# Patient Record
Sex: MALE | Race: WHITE | NOT HISPANIC OR LATINO | Employment: OTHER | URBAN - METROPOLITAN AREA
[De-identification: names, ages, dates, MRNs, and addresses within clinical notes are randomized per-mention and may not be internally consistent; named-entity substitution may affect disease eponyms.]

---

## 2018-04-12 ENCOUNTER — HOSPITAL ENCOUNTER (INPATIENT)
Facility: HOSPITAL | Age: 78
LOS: 4 days | Discharge: HOME | End: 2018-04-16
Attending: EMERGENCY MEDICINE | Admitting: INTERNAL MEDICINE

## 2018-04-12 ENCOUNTER — APPOINTMENT (OUTPATIENT)
Dept: GENERAL RADIOLOGY | Facility: HOSPITAL | Age: 78
End: 2018-04-12

## 2018-04-12 ENCOUNTER — APPOINTMENT (OUTPATIENT)
Dept: CT IMAGING | Facility: HOSPITAL | Age: 78
End: 2018-04-12

## 2018-04-12 DIAGNOSIS — R77.8 ELEVATED TROPONIN: ICD-10-CM

## 2018-04-12 DIAGNOSIS — R26.9 GAIT ABNORMALITY: ICD-10-CM

## 2018-04-12 DIAGNOSIS — I63.22 BASILAR ARTERY OCCLUSION WITH CEREBRAL INFARCTION (HCC): Primary | ICD-10-CM

## 2018-04-12 LAB
ABO GROUP BLD: NORMAL
ALBUMIN SERPL-MCNC: 4.4 G/DL (ref 3.5–5.2)
ALBUMIN/GLOB SERPL: 1.3 G/DL
ALP SERPL-CCNC: 119 U/L (ref 39–117)
ALT SERPL W P-5'-P-CCNC: 18 U/L (ref 1–41)
ANION GAP SERPL CALCULATED.3IONS-SCNC: 14.7 MMOL/L
APTT PPP: 31.7 SECONDS (ref 22.7–35.4)
AST SERPL-CCNC: 29 U/L (ref 1–40)
BACTERIA UR QL AUTO: ABNORMAL /HPF
BASOPHILS # BLD AUTO: 0.02 10*3/MM3 (ref 0–0.2)
BASOPHILS NFR BLD AUTO: 0.1 % (ref 0–1.5)
BILIRUB SERPL-MCNC: 0.3 MG/DL (ref 0.1–1.2)
BILIRUB UR QL STRIP: NEGATIVE
BLD GP AB SCN SERPL QL: NEGATIVE
BUN BLD-MCNC: 18 MG/DL (ref 8–23)
BUN/CREAT SERPL: 18.6 (ref 7–25)
CALCIUM SPEC-SCNC: 9.3 MG/DL (ref 8.6–10.5)
CHLORIDE SERPL-SCNC: 96 MMOL/L (ref 98–107)
CLARITY UR: ABNORMAL
CO2 SERPL-SCNC: 27.3 MMOL/L (ref 22–29)
COLOR UR: ABNORMAL
CREAT BLD-MCNC: 0.97 MG/DL (ref 0.76–1.27)
DEPRECATED RDW RBC AUTO: 42.7 FL (ref 37–54)
EOSINOPHIL # BLD AUTO: 0.1 10*3/MM3 (ref 0–0.7)
EOSINOPHIL NFR BLD AUTO: 0.7 % (ref 0.3–6.2)
ERYTHROCYTE [DISTWIDTH] IN BLOOD BY AUTOMATED COUNT: 12.2 % (ref 11.5–14.5)
GFR SERPL CREATININE-BSD FRML MDRD: 75 ML/MIN/1.73
GLOBULIN UR ELPH-MCNC: 3.5 GM/DL
GLUCOSE BLD-MCNC: 122 MG/DL (ref 65–99)
GLUCOSE BLDC GLUCOMTR-MCNC: 102 MG/DL (ref 70–130)
GLUCOSE BLDC GLUCOMTR-MCNC: 123 MG/DL (ref 70–130)
GLUCOSE BLDC GLUCOMTR-MCNC: 124 MG/DL (ref 70–130)
GLUCOSE UR STRIP-MCNC: NEGATIVE MG/DL
HCT VFR BLD AUTO: 44.4 % (ref 40.4–52.2)
HGB BLD-MCNC: 14.8 G/DL (ref 13.7–17.6)
HGB UR QL STRIP.AUTO: ABNORMAL
HOLD SPECIMEN: NORMAL
HOLD SPECIMEN: NORMAL
HYALINE CASTS UR QL AUTO: ABNORMAL /LPF
IMM GRANULOCYTES # BLD: 0.06 10*3/MM3 (ref 0–0.03)
IMM GRANULOCYTES NFR BLD: 0.4 % (ref 0–0.5)
INR PPP: 1.1 (ref 0.9–1.1)
KETONES UR QL STRIP: NEGATIVE
LEUKOCYTE ESTERASE UR QL STRIP.AUTO: ABNORMAL
LYMPHOCYTES # BLD AUTO: 0.68 10*3/MM3 (ref 0.9–4.8)
LYMPHOCYTES NFR BLD AUTO: 4.4 % (ref 19.6–45.3)
MCH RBC QN AUTO: 32 PG (ref 27–32.7)
MCHC RBC AUTO-ENTMCNC: 33.3 G/DL (ref 32.6–36.4)
MCV RBC AUTO: 96.1 FL (ref 79.8–96.2)
MONOCYTES # BLD AUTO: 1.08 10*3/MM3 (ref 0.2–1.2)
MONOCYTES NFR BLD AUTO: 7 % (ref 5–12)
NEUTROPHILS # BLD AUTO: 13.43 10*3/MM3 (ref 1.9–8.1)
NEUTROPHILS NFR BLD AUTO: 87.4 % (ref 42.7–76)
NITRITE UR QL STRIP: POSITIVE
PH UR STRIP.AUTO: 7.5 [PH] (ref 5–8)
PLATELET # BLD AUTO: 235 10*3/MM3 (ref 140–500)
PMV BLD AUTO: 9.5 FL (ref 6–12)
POTASSIUM BLD-SCNC: 4 MMOL/L (ref 3.5–5.2)
PROT SERPL-MCNC: 7.9 G/DL (ref 6–8.5)
PROT UR QL STRIP: ABNORMAL
PROTHROMBIN TIME: 14 SECONDS (ref 11.7–14.2)
RBC # BLD AUTO: 4.62 10*6/MM3 (ref 4.6–6)
RBC # UR: ABNORMAL /HPF
REF LAB TEST METHOD: ABNORMAL
RH BLD: NEGATIVE
SODIUM BLD-SCNC: 138 MMOL/L (ref 136–145)
SP GR UR STRIP: >=1.03 (ref 1–1.03)
SQUAMOUS #/AREA URNS HPF: ABNORMAL /HPF
T&S EXPIRATION DATE: NORMAL
TROPONIN T SERPL-MCNC: 0.41 NG/ML (ref 0–0.03)
TROPONIN T SERPL-MCNC: 0.48 NG/ML (ref 0–0.03)
UROBILINOGEN UR QL STRIP: ABNORMAL
WBC NRBC COR # BLD: 15.37 10*3/MM3 (ref 4.5–10.7)
WBC UR QL AUTO: ABNORMAL /HPF
WHOLE BLOOD HOLD SPECIMEN: NORMAL
WHOLE BLOOD HOLD SPECIMEN: NORMAL

## 2018-04-12 PROCEDURE — 80053 COMPREHEN METABOLIC PANEL: CPT | Performed by: EMERGENCY MEDICINE

## 2018-04-12 PROCEDURE — 85025 COMPLETE CBC W/AUTO DIFF WBC: CPT | Performed by: EMERGENCY MEDICINE

## 2018-04-12 PROCEDURE — 0042T HC CT CEREBRAL PERFUSION W/WO CONTRAST: CPT

## 2018-04-12 PROCEDURE — 82565 ASSAY OF CREATININE: CPT

## 2018-04-12 PROCEDURE — 84484 ASSAY OF TROPONIN QUANT: CPT | Performed by: NURSE PRACTITIONER

## 2018-04-12 PROCEDURE — 86900 BLOOD TYPING SEROLOGIC ABO: CPT | Performed by: EMERGENCY MEDICINE

## 2018-04-12 PROCEDURE — 93005 ELECTROCARDIOGRAM TRACING: CPT | Performed by: EMERGENCY MEDICINE

## 2018-04-12 PROCEDURE — 87086 URINE CULTURE/COLONY COUNT: CPT | Performed by: EMERGENCY MEDICINE

## 2018-04-12 PROCEDURE — 85610 PROTHROMBIN TIME: CPT | Performed by: EMERGENCY MEDICINE

## 2018-04-12 PROCEDURE — 93010 ELECTROCARDIOGRAM REPORT: CPT | Performed by: INTERNAL MEDICINE

## 2018-04-12 PROCEDURE — 82962 GLUCOSE BLOOD TEST: CPT

## 2018-04-12 PROCEDURE — 70498 CT ANGIOGRAPHY NECK: CPT

## 2018-04-12 PROCEDURE — 70496 CT ANGIOGRAPHY HEAD: CPT

## 2018-04-12 PROCEDURE — 99406 BEHAV CHNG SMOKING 3-10 MIN: CPT | Performed by: RADIOLOGY

## 2018-04-12 PROCEDURE — 81001 URINALYSIS AUTO W/SCOPE: CPT | Performed by: EMERGENCY MEDICINE

## 2018-04-12 PROCEDURE — 82607 VITAMIN B-12: CPT | Performed by: PSYCHIATRY & NEUROLOGY

## 2018-04-12 PROCEDURE — 71045 X-RAY EXAM CHEST 1 VIEW: CPT

## 2018-04-12 PROCEDURE — 85730 THROMBOPLASTIN TIME PARTIAL: CPT | Performed by: EMERGENCY MEDICINE

## 2018-04-12 PROCEDURE — 99223 1ST HOSP IP/OBS HIGH 75: CPT | Performed by: RADIOLOGY

## 2018-04-12 PROCEDURE — 99221 1ST HOSP IP/OBS SF/LOW 40: CPT | Performed by: NURSE PRACTITIONER

## 2018-04-12 PROCEDURE — 86850 RBC ANTIBODY SCREEN: CPT | Performed by: EMERGENCY MEDICINE

## 2018-04-12 PROCEDURE — 0 IOPAMIDOL PER 1 ML: Performed by: EMERGENCY MEDICINE

## 2018-04-12 PROCEDURE — 86901 BLOOD TYPING SEROLOGIC RH(D): CPT | Performed by: EMERGENCY MEDICINE

## 2018-04-12 PROCEDURE — 99285 EMERGENCY DEPT VISIT HI MDM: CPT

## 2018-04-12 PROCEDURE — 84484 ASSAY OF TROPONIN QUANT: CPT | Performed by: EMERGENCY MEDICINE

## 2018-04-12 RX ORDER — ATORVASTATIN CALCIUM 80 MG/1
80 TABLET, FILM COATED ORAL DAILY
Status: DISCONTINUED | OUTPATIENT
Start: 2018-04-12 | End: 2018-04-16 | Stop reason: HOSPADM

## 2018-04-12 RX ORDER — LEVOTHYROXINE SODIUM 0.05 MG/1
50 TABLET ORAL DAILY
COMMUNITY

## 2018-04-12 RX ORDER — ASPIRIN 325 MG
325 TABLET ORAL DAILY
Status: DISCONTINUED | OUTPATIENT
Start: 2018-04-12 | End: 2018-04-14

## 2018-04-12 RX ORDER — SODIUM CHLORIDE 0.9 % (FLUSH) 0.9 %
10 SYRINGE (ML) INJECTION AS NEEDED
Status: DISCONTINUED | OUTPATIENT
Start: 2018-04-12 | End: 2018-04-16 | Stop reason: HOSPADM

## 2018-04-12 RX ORDER — SODIUM CHLORIDE 9 MG/ML
100 INJECTION, SOLUTION INTRAVENOUS CONTINUOUS
Status: DISCONTINUED | OUTPATIENT
Start: 2018-04-12 | End: 2018-04-13

## 2018-04-12 RX ORDER — SODIUM CHLORIDE 0.9 % (FLUSH) 0.9 %
1-10 SYRINGE (ML) INJECTION AS NEEDED
Status: DISCONTINUED | OUTPATIENT
Start: 2018-04-12 | End: 2018-04-16 | Stop reason: HOSPADM

## 2018-04-12 RX ORDER — AMLODIPINE BESYLATE 5 MG/1
5 TABLET ORAL DAILY
COMMUNITY
End: 2018-04-16 | Stop reason: HOSPADM

## 2018-04-12 RX ORDER — ASPIRIN 325 MG
325 TABLET ORAL ONCE
Status: COMPLETED | OUTPATIENT
Start: 2018-04-12 | End: 2018-04-12

## 2018-04-12 RX ORDER — SODIUM CHLORIDE 0.9 % (FLUSH) 0.9 %
10 SYRINGE (ML) INJECTION AS NEEDED
Status: DISCONTINUED | OUTPATIENT
Start: 2018-04-12 | End: 2018-04-12

## 2018-04-12 RX ORDER — ACETAMINOPHEN 325 MG/1
650 TABLET ORAL EVERY 4 HOURS PRN
Status: DISCONTINUED | OUTPATIENT
Start: 2018-04-12 | End: 2018-04-16 | Stop reason: HOSPADM

## 2018-04-12 RX ORDER — ASPIRIN 300 MG/1
300 SUPPOSITORY RECTAL DAILY
Status: DISCONTINUED | OUTPATIENT
Start: 2018-04-12 | End: 2018-04-14

## 2018-04-12 RX ORDER — ACETAMINOPHEN 650 MG/1
650 SUPPOSITORY RECTAL EVERY 4 HOURS PRN
Status: DISCONTINUED | OUTPATIENT
Start: 2018-04-12 | End: 2018-04-16 | Stop reason: HOSPADM

## 2018-04-12 RX ORDER — ONDANSETRON 2 MG/ML
4 INJECTION INTRAMUSCULAR; INTRAVENOUS EVERY 6 HOURS PRN
Status: DISCONTINUED | OUTPATIENT
Start: 2018-04-12 | End: 2018-04-16 | Stop reason: HOSPADM

## 2018-04-12 RX ORDER — LEVOTHYROXINE SODIUM ANHYDROUS 100 UG/5ML
25 INJECTION, POWDER, LYOPHILIZED, FOR SOLUTION INTRAVENOUS
Status: DISCONTINUED | OUTPATIENT
Start: 2018-04-12 | End: 2018-04-14

## 2018-04-12 RX ORDER — LISINOPRIL AND HYDROCHLOROTHIAZIDE 25; 20 MG/1; MG/1
1 TABLET ORAL DAILY
COMMUNITY
End: 2018-05-01 | Stop reason: HOSPADM

## 2018-04-12 RX ORDER — ATORVASTATIN CALCIUM 20 MG/1
20 TABLET, FILM COATED ORAL DAILY
COMMUNITY

## 2018-04-12 RX ADMIN — ASPIRIN 325 MG: 325 TABLET ORAL at 12:24

## 2018-04-12 RX ADMIN — SODIUM CHLORIDE 1000 ML: 9 INJECTION, SOLUTION INTRAVENOUS at 11:50

## 2018-04-12 RX ADMIN — ATORVASTATIN CALCIUM 80 MG: 80 TABLET, FILM COATED ORAL at 15:38

## 2018-04-12 RX ADMIN — IOPAMIDOL 150 ML: 755 INJECTION, SOLUTION INTRAVENOUS at 11:33

## 2018-04-12 RX ADMIN — SODIUM CHLORIDE 100 ML/HR: 9 INJECTION, SOLUTION INTRAVENOUS at 15:38

## 2018-04-12 NOTE — CONSULTS
DOS: 2018  NAME: Jalen Richards   : 1940  PCP: No Known Provider  CC: Stroke  Referring MD: Alan Santana MD    Neurological Problem and Interval History:  77 y.o. RHW male with a Hx of hyperlipidemia, hypertension and smoking.  The patient was in usual state of excellent health when 8:30 this morning he suddenly felt disoriented, meaning dizzy and vertiginous. His legs couldn't hold him but he did not fall.  When he tried to speak his speech was very slurred.  He did not seek immediate medical attention but then eventually as the symptoms were not improving he came to [facility] where he started to improve.  His NIHSS was a 2 and therefore he was not a TPA candidate.  He feels currently that he is nearly 100% normal other than having a sense that his equilibrium is off.  He denies prior stroke or TIA symptoms.  He denies a history to fibrillation.  He smokes one pack per day.  He does not take any antithrombotic's.  He is newly diagnosed with hypertension has been started on some medications.  He lives with his wife and is cognitively normal and independent.    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 No pertinent surgical history.    Review of Systems:        A complete review of all systems is negative except as described above.    Medications On Admission  Lisinopril hydrochlorothiazide, Lipitor 20, thyroid hormone, calcium channel blocker    Allergies:  No Known Allergies    Social Hx:  Social History     Social History   • Marital status:      Spouse name: N/A   • Number of children: N/A   • Years of education: N/A     Occupational History   • Not on file.     Social History Main Topics   • Smoking status: Current Every Day Smoker     Packs/day: 1.00     Types: Cigarettes   • Smokeless tobacco: Not on file    • Alcohol use Not on file   • Drug use: Unknown   • Sexual activity: Not on file     Other Topics Concern   • Not on file     Social History Narrative   • No narrative on file       Family Hx:  History reviewed. No pertinent family history.    Review of Imaging (Interpretation of images not reports):  CT angiogram head and neck:There is calcification of the aortic arch with an aneurysm medially of the proximal descending aorta, and subclavian arteries are patent and there is a severe stenosis of the origin of the left vertebral artery which is slightly smaller in the right, there is severe bulky plaque left internal carotid artery worse than right with ulceration, intracranially the left vertebral artery appears to have a severe stenosis as it penetrates the dura, the basilar artery is patent proximally but is flush occluded in its terminus, there is calcification of the intracranial internal carotid arteries and bilateral straight communicating arteries filling the basilar terminus and posterior cerebral arteries    CT scan of the brain is a subtle hypodensity in the medial right occipital lobe that could represent acute to subacute stroke, there is an old stroke in the left occipital parietal lobe, is a dense calcification in the left subcortical frontal white matter likely representing dystrophic calcification or a cavernous angioma    Laboratory Results:   Lab Results   Component Value Date    GLUCOSE 122 (H) 04/12/2018    CALCIUM 9.3 04/12/2018     04/12/2018    K 4.0 04/12/2018    CO2 27.3 04/12/2018    CL 96 (L) 04/12/2018    BUN 18 04/12/2018    CREATININE 0.97 04/12/2018    EGFRIFNONA 75 04/12/2018    BCR 18.6 04/12/2018    ANIONGAP 14.7 04/12/2018     Lab Results   Component Value Date    WBC 15.37 (H) 04/12/2018    HGB 14.8 04/12/2018    HCT 44.4 04/12/2018    MCV 96.1 04/12/2018     04/12/2018     No results found for: CHOL  No results found for: HDL  No results found for: LDL  No results  "found for: TRIG  No results found for: HGBA1C  Lab Results   Component Value Date    INR 1.10 04/12/2018    PROTIME 14.0 04/12/2018   EKG: Sinus rhythm    Physical Examination:  /98   Pulse 92   Temp 97.8 °F (36.6 °C) (Tympanic)   Resp 16   Ht 180.3 cm (71\")   Wt 81.6 kg (180 lb)   SpO2 96%   BMI 25.10 kg/m²   General Appearance:   Well developed, well nourished, well groomed, alert, and cooperative.  HEENT: Normocephalic.  Normal fundoscopic exam including normal retina, discs are flat with sharp margins, normal vasculature.  Cb's sign.  Black to dark greenish discoloration to the top of the tongue  Neck and Spine: Normal range of motion.  Normal alignment. No mass or tenderness. No bruits.  Cardiac: Regular rate and rhythm. No murmurs.  Peripheral Vasculature: Radial 1+ and pedal pulses are trace. No signs of distal embolization.  Extremities:    No edema or deformities. Normal joint ROM.  Skin:    No rashes or birth marks.    Neurological examination:  Higher Integrative  Function: Oriented to time, place and person. Normal registration, recall, attention span and concentration. Normal language including comprehension, spontaneous speech, repetition, reading, writing, naming and vocabulary. No neglect with normal visual-spatial function and construction. Normal fund of knowledge and higher integrative function.  CN II: Pupils are equal, round, and reactive to light. Normal visual acuity and visual fields.    CN III IV VI: Extraocular movements are full without nystagmus.   CN V: Normal facial sensation and strength of muscles of mastication.  CN VII: Facial movements are symmetric. No weakness.  CN VIII:   Auditory acuity is slightly reduced  CN IX & X:   Symmetric palatal movement.  CN XI: Sternocleidomastoid and trapezius are normal.  No weakness.  CN XII:   The tongue is midline.  No atrophy or fasciculations.  Motor: Normal muscle strength, bulk and tone in upper and lower extremities.  No " fasciculations, rigidity, spasticity, or abnormal movements.  Reflexes: 1+ in the R and 2+ L upper and lower extremities. Plantar responses are flexor.  Sensation: Normal to light touch, temperature, and proprioception in arms and legs. Normal graphesthesia and no extinction on DSS.  Station and Gait: Needs assistance to sit up and is unsteady.    Coordination: Finger to nose test shows no dysmetria.  Rapid alternating movements are normal.  Heel to shin normal.  NIHSS: 0    Diagnoses / Discussion:  77 y.o. who presents with Sx of dysarthria and vertigo and bilateral lower extremity weakness who is essentially normal on examination.  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  Additionally he has a severe stenosis of the origin the left vertebral artery.  The patient is not a TPA or endovascular therapy candidate as he is normal.  The basilar artery occlusion is most likely an embolic possibly artery to artery origin although a cardioembolic source cannot be excluded.  There is some potential for neurological deterioration therefore he will need to be monitored in intensive care unit if he makes it through the next 1-2 days without a deterioration the neurological prognosis will be excellent and the major issue will be confirming the etiology.  He needs risk factor control in particular he needs to discontinue smoking.  I counseled him and his family for about 7 minutes on the importance of and tools for smoking cessation.    Plan:  Aspirin 325mg  Hydrate- bolus 1 L normal saline stat (done)  Neurochecks  Check Lipid panel, Hgb A1C  Lipitor 80mg  Non-pharmacological DVT prophylaxis  TTE  CT/CTA/CTP stat (done)  MRI tomorrow  EKG Tele  PT/OT/ST  Stroke Education  Blood pressure control to <130/80  Goal LDL <70-recommend high dose statins-   Serum glucose < 140     Call 911 for stroke any stroke symptoms    I have discussed the above with the patient and  family and Dr Diaz.  Time spent with patient: 60min    MDM  Reviewed: vitals  Interpretation: CT scan, ECG and labs      Dictated using Dragon dictation.

## 2018-04-12 NOTE — SIGNIFICANT NOTE
04/12/18 1436   Rehab Time/Intention   Evaluation Not Performed other (see comments)  (RN asked for PT to check back tomorrow as pt was just admitted. )   Rehab Treatment   Discipline physical therapist   Recommendation   PT - Next Appointment 04/13/18

## 2018-04-12 NOTE — ED PROVIDER NOTES
EMERGENCY DEPARTMENT ENCOUNTER    CHIEF COMPLAINT  Chief Complaint: Dysphasia   History given by: Pt and family  History limited by: Nothing  Room Number: 37/37  PMD: No Known Provider      HPI:  Pt is a 77 y.o. male who presents complaining of sudden onset of difficulty talking and standing starting sometime between 0830 to 0900 this morning. Pt also c/o lightheadedness and LLE weakness. Pt reports his speech has improve, but is still not at baseline. Pt denies HA, visual disturbances, difficulty swallowing, palpitations, CP, SOA. Pt has a hx of HTN, high cholesterol, and hypothyroidism, and smoking. He denies a family hx of heart disease. Pt reports he takes a low dose of ASA every morning.     Duration:  Sx starting at [id number]  Onset: sudden  Timing: constant  Quality: difficulty talking  Intensity/Severity: moderate  Progression: improving  Associated Symptoms: lightheadedness, LLE weakness, difficulty standing  Aggravating Factors: none  Alleviating Factors: none  Previous Episodes: Pt reports a hx of high cholesterol, HTN, hypothyroidism, and smoking  Treatment before arrival: Pt reports he takes a low dose of ASA every morning    PAST MEDICAL HISTORY  Active Ambulatory Problems     Diagnosis Date Noted   • No Active Ambulatory Problems     Resolved Ambulatory Problems     Diagnosis Date Noted   • No Resolved Ambulatory Problems     Past Medical History:   Diagnosis Date   • Disease of thyroid gland    • Hyperlipidemia    • Hypertension        PAST SURGICAL HISTORY  History reviewed. No pertinent surgical history.    FAMILY HISTORY  History reviewed. No pertinent family history.    SOCIAL HISTORY  Social History     Social History   • Marital status:      Spouse name: N/A   • Number of children: N/A   • Years of education: N/A     Occupational History   • Not on file.     Social History Main Topics   • Smoking status: Current Every Day Smoker     Packs/day: 1.00     Types: Cigarettes   • Smokeless  tobacco: Not on file   • Alcohol use Not on file   • Drug use: Unknown   • Sexual activity: Not on file     Other Topics Concern   • Not on file     Social History Narrative   • No narrative on file       ALLERGIES  Review of patient's allergies indicates no known allergies.    REVIEW OF SYSTEMS  Review of Systems   Constitutional: Negative for activity change, appetite change and fever.   HENT: Negative for congestion and sore throat.    Eyes: Negative.    Respiratory: Negative for cough and shortness of breath.    Cardiovascular: Negative for chest pain and leg swelling.   Gastrointestinal: Negative for abdominal pain, diarrhea and vomiting.   Endocrine: Negative.    Genitourinary: Negative for decreased urine volume and dysuria.   Musculoskeletal: Negative for neck pain.   Skin: Negative for rash and wound.   Allergic/Immunologic: Negative.    Neurological: Positive for speech difficulty, weakness (LLE) and light-headedness. Negative for numbness and headaches.        Pt c/o difficulty standing   Hematological: Negative.    Psychiatric/Behavioral: Negative.    All other systems reviewed and are negative.      PHYSICAL EXAM  ED Triage Vitals   Temp Heart Rate Resp BP SpO2   04/12/18 1053 04/12/18 1053 04/12/18 1053 04/12/18 1057 04/12/18 1053   97.8 °F (36.6 °C) 78 14 (!) 189/91 96 %      Temp src Heart Rate Source Patient Position BP Location FiO2 (%)   04/12/18 1048 04/12/18 1048 04/12/18 1057 04/12/18 1057 --   Tympanic Monitor Lying Left arm          Physical Exam   Constitutional: He is oriented to person, place, and time and well-developed, well-nourished, and in no distress.   HENT:   Head: Normocephalic and atraumatic.   Eyes: EOM are normal. Pupils are equal, round, and reactive to light.   Neck: Normal range of motion. Neck supple.   Cardiovascular: Normal rate, regular rhythm and normal heart sounds.    No murmur heard.  Pulmonary/Chest: Effort normal and breath sounds normal. No respiratory distress.    Abdominal: Soft. There is no tenderness. There is no rebound and no guarding.   Musculoskeletal: Normal range of motion. He exhibits no edema.   Neurological: He is alert and oriented to person, place, and time.   See NIH Stroke Scale at 1059   Skin: Skin is warm and dry.   Psychiatric: Mood and affect normal.   Nursing note and vitals reviewed.      LAB RESULTS  Lab Results (last 24 hours)     Procedure Component Value Units Date/Time    POC Glucose Once [[id number]]  (Normal) Collected:  04/12/18 1055    Specimen:  Blood Updated:  04/12/18 1058     Glucose 123 mg/dL     Narrative:       Meter: [id number] : [id number] Annamaria Reyna RN    CBC & Differential [[id number]] Collected:  04/12/18 1100    Specimen:  Blood Updated:  04/12/18 1114    Narrative:       The following orders were created for panel order CBC & Differential.  Procedure                               Abnormality         Status                     ---------                               -----------         ------                     CBC Auto Differential[[id number]]        Abnormal            Final result                 Please view results for these tests on the individual orders.    Comprehensive Metabolic Panel [[id number]]  (Abnormal) Collected:  04/12/18 1100    Specimen:  Blood Updated:  04/12/18 1135     Glucose 122 (H) mg/dL      BUN 18 mg/dL      Creatinine 0.97 mg/dL      Sodium 138 mmol/L      Potassium 4.0 mmol/L      Chloride 96 (L) mmol/L      CO2 27.3 mmol/L      Calcium 9.3 mg/dL      Total Protein 7.9 g/dL      Albumin 4.40 g/dL      ALT (SGPT) 18 U/L      AST (SGOT) 29 U/L      Alkaline Phosphatase 119 (H) U/L      Total Bilirubin 0.3 mg/dL      eGFR Non African Amer 75 mL/min/1.73      Globulin 3.5 gm/dL      A/G Ratio 1.3 g/dL      BUN/Creatinine Ratio 18.6     Anion Gap 14.7 mmol/L     Narrative:       The MDRD GFR formula is only valid for adults with stable renal function between ages 18 and 70.    Protime-INR [[id number]]  (Normal)  Collected:  04/12/18 1100    Specimen:  Blood Updated:  04/12/18 1130     Protime 14.0 Seconds      INR 1.10    aPTT [[id number]]  (Normal) Collected:  04/12/18 1100    Specimen:  Blood Updated:  04/12/18 1130     PTT 31.7 seconds     Troponin [[id number]]  (Abnormal) Collected:  04/12/18 1100    Specimen:  Blood Updated:  04/12/18 1138     Troponin T 0.476 (C) ng/mL     Narrative:       Troponin T Reference Ranges:  Less than 0.03 ng/mL:    Negative for AMI  0.03 to 0.09 ng/mL:      Indeterminant for AMI  Greater than 0.09 ng/mL: Positive for AMI    CBC Auto Differential [[id number]]  (Abnormal) Collected:  04/12/18 1100    Specimen:  Blood Updated:  04/12/18 1114     WBC 15.37 (H) 10*3/mm3      RBC 4.62 10*6/mm3      Hemoglobin 14.8 g/dL      Hematocrit 44.4 %      MCV 96.1 fL      MCH 32.0 pg      MCHC 33.3 g/dL      RDW 12.2 %      RDW-SD 42.7 fl      MPV 9.5 fL      Platelets 235 10*3/mm3      Neutrophil % 87.4 (H) %      Lymphocyte % 4.4 (L) %      Monocyte % 7.0 %      Eosinophil % 0.7 %      Basophil % 0.1 %      Immature Grans % 0.4 %      Neutrophils, Absolute 13.43 (H) 10*3/mm3      Lymphocytes, Absolute 0.68 (L) 10*3/mm3      Monocytes, Absolute 1.08 10*3/mm3      Eosinophils, Absolute 0.10 10*3/mm3      Basophils, Absolute 0.02 10*3/mm3      Immature Grans, Absolute 0.06 (H) 10*3/mm3     Urinalysis With / Culture If Indicated - Urine, Clean Catch [[id number]] Collected:  04/12/18 1218    Specimen:  Urine from Urine, Clean Catch Updated:  04/12/18 1222    Urinalysis, Microscopic Only - Urine, Clean Catch [[id number]] Collected:  04/12/18 1218    Specimen:  Urine from Urine, Clean Catch Updated:  04/12/18 1230    Urine Culture - Urine, Urine, Clean Catch [[id number]] Collected:  04/12/18 1218    Specimen:  Urine from Urine, Clean Catch Updated:  04/12/18 1230          I ordered the above labs and reviewed the results    RADIOLOGY  XR Chest 1 View   Final Result   No focal pulmonary consolidation. Tortuous  aorta. Follow-up   as clinically indicated.       This report was finalized on 4/12/2018 12:50 PM by Dr. Jarad Kothari MD.          CT Angiogram Neck With & Without Contrast    (Results Pending)   CT Cerebral Perfusion With & Without Contrast    (Results Pending)   CT Angiogram Head With & Without Contrast    (Results Pending)   MRI Brain With & Without Contrast    (Results Pending)    Spoke with Dr. Gordon about CT results who reports that the pt has basilar artery occlusion.    I ordered the above noted radiological studies. Interpreted by radiologist. Reviewed by me in PACS.       PROCEDURES  Critical Care  Performed by: PHILOMENA SÁNCHEZ  Authorized by: PHILOMENA SÁNCHEZ     Critical care provider statement:     Critical care time (minutes):  35    Critical care time was exclusive of:  Separately billable procedures and treating other patients    Critical care was necessary to treat or prevent imminent or life-threatening deterioration of the following conditions:  CNS failure or compromise    Critical care was time spent personally by me on the following activities:  Ordering and performing treatments and interventions, ordering and review of laboratory studies, ordering and review of radiographic studies, pulse oximetry, re-evaluation of patient's condition, review of old charts, interpretation of cardiac output measurements, obtaining history from patient or surrogate, examination of patient, evaluation of patient's response to treatment, development of treatment plan with patient or surrogate and discussions with consultants          NIH Stroke Scale at 1059  Interval: baseline  1a. Level of Consciousness: 0-->Alert, keenly responsive  1b. LOC Questions: 0-->Answers both questions correctly  1c. LOC Commands: 0-->Performs both tasks correctly  2. Best Gaze: 0-->Normal  3. Visual: 0-->No visual loss  4. Facial Palsy: 0-->Normal symmetrical movements  5a. Motor Arm, Left: 0-->No drift, limb holds 90  (or 45) degrees for full 10 secs  5b. Motor Arm, Right: 0-->No drift, limb holds 90 (or 45) degrees for full 10 secs  6a. Motor Leg, Left: 1-->Drift, leg falls by the end of the 5-sec period but does not hit bed  6b. Motor Leg, Right: 0-->No drift, leg holds 30 degree position for full 5 secs  7. Limb Ataxia: 0-->Absent  8. Sensory: 0-->Normal, no sensory loss  9. Best Language: 1-->Mild-to-moderate aphasia, some obvious loss of fluency or facility of comprehension, without significant limitation on ideas expressed or form of expression. Reduction of speech and/or comprehension, however, makes conversation. . . (see row details)  10. Dysarthria: 0-->Normal  11. Extinction and Inattention (formerly Neglect): 0-->No abnormality    Total (NIH Stroke Scale): 2    EKG           EKG time: 1149  Rhythm/Rate: nsr, 87  P waves and WV: normal  QRS, axis: normal   ST and T waves: normal     Interpreted Contemporaneously by me, independently viewed  unchanged compared to prior 3/12/15        PROGRESS AND CONSULTS  ED Course       1059 - Team D protocol initiated. Medications, imaging and lab work ordered per protocol .    1104 - Consulted with Dr. Gordon (Stroke neurologist) who recommends performing complete Team D protocol, but is not a tPA candidate because stroke scale is too low, and sx are improving.     1145 - I have still not heard about the pt's CT head results, and Dr. Castillo is reading them now.     1149 - Rechecked pt. Pt's LLE no longer has a drift, and his expressive aphasia is now gone. Pt's IV infiltrated into R AC with mild swelling to distal R arm that is non-tender. Plan is to apply cold compress to the area.     1209 - Heidi reports that the pt has a basilar artery occlusion and recommends the pt be admitted to the ICU.     1213 - Consulted with Dr. Alan Santana (Pulmonology) who agrees to admit the pt.     1216 - ASA ordered. UA ordered.     MEDICAL DECISION MAKING  Results were reviewed/discussed  with the patient and they were also made aware of online access. Pt also made aware that some labs, such as cultures, will not be resulted during ER visit and follow up with PMD is necessary.     MDM  Number of Diagnoses or Management Options  Basilar artery occlusion with cerebral infarction:      Amount and/or Complexity of Data Reviewed  Clinical lab tests: reviewed and ordered (WBC - 15.37  Troponin - 0.476  INR - 1.10)  Tests in the radiology section of CPT®: reviewed and ordered (CT head results (Per Dr. Gordon) - Basilar artery occlusion)  Tests in the medicine section of CPT®: reviewed and ordered (See EKG procedure note.)  Discuss the patient with other providers: yes (Dr. Gordon (Stroke Neurology)  Dr. Alan Santana (Pulmonology))    Critical Care  Total time providing critical care: 30-74 minutes         DIAGNOSIS  Final diagnoses:   Basilar artery occlusion with cerebral infarction   Elevated troponin       DISPOSITION  ADMISSION    Discussed treatment plan and reason for admission with pt/family and admitting physician.  Pt/family voiced understanding of the plan for admission for further testing/treatment as needed.     Latest Documented Vital Signs:  As of 12:49 PM  BP- (!) 181/83 HR- 90 Temp- 97.8 °F (36.6 °C) (Tympanic) O2 sat- 97%    --  Documentation assistance provided by karma Bocanegra for Dr. Diaz.  Information recorded by the scribe was done at my direction and has been verified and validated by me.     Kevin Bocanegra  04/12/18 9795       Vitor Diaz MD  04/12/18 8540

## 2018-04-12 NOTE — H&P
[facility]    CC: difficultly talking and weakness    HPI: Mr. Richards is a 78yo WM with a history of tobacco abuse.  He reports sudden onset of severe aphasia and some mild left lower extremity weakness. No Particular aggravating or alleviating factors, but it resolved spontaneously.  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    Past Medical History:   Diagnosis Date   • Disease of thyroid gland    • Hyperlipidemia    • Hypertension      Social History     Social History   • Marital status:      Social History Main Topics   • Smoking status: Current Every Day Smoker     Packs/day: 1.00   • Smokeless tobacco: Never Used   • Alcohol use No   • Drug use: No   • Sexual activity: Not Currently     Other Topics Concern   • Not on file     History reviewed. No pertinent family history.  MEDS:  List as per rec  ALL: NKDA  ROS:  Constitutional: Negative for activity change, appetite change and fever.   HENT: Negative for congestion and sore throat.    Eyes: Negative.    Respiratory: Negative for cough and shortness of breath.    Cardiovascular: Negative for chest pain and leg swelling.   Gastrointestinal: Negative for abdominal pain, diarrhea and vomiting.   Endocrine: Negative.    Genitourinary: hematuria +  Musculoskeletal: Negative for neck pain.   Skin: Negative for rash and wound.   Allergic/Immunologic: Negative.    Neurological: Positive for speech difficulty, weakness (LLE) and light-headedness. Negative for numbness and headaches.        Pt c/o difficulty standing   Hematological: Negative.    Psychiatric/Behavioral: Negative.    All other systems reviewed and are negative.    GEN:   appears ill,  AxOx3  HEENT: PERRL, EOMI, no icterus, mmm, no jvd, trachea midline, neck supple  CHEST: CTA bilat, no wheezes, no crackles, no use of accessory muscles  CV: RRR, no  m/g/r  ABD: soft, nt, nd +bs, no hepatosplenomegaly  EXT: no c/c/e  SKIN: no rashes, no xanthomas, nl turgor  LYMPH: no palpable cervical or supraclavicular lymphadenopathy  NEURO: CN 2-12 intact and symmetric bilaterally  PSYCH: nl affect, nl orientation, nl judgement, nl mood  MSK: No kyphoscoliosis, 5/5 strength ue and le bilaterally    Labs:  BmP; unremarkable  Trop 0.476  Wbc 15  hgb 14.8  plts 235    Imaging: reviewed    A/P:  1. Acute CVA -- care as per protocol; DR. Lopez following.   2. Basilar artery occlusion  3. Carotid artery stenosis  4. Hypertension -  5. Hypothyroidism -- continue replacement  6. Tobacco abuse -- discussed cessation  7. Elevated troponin -- will have cards to see  8. Leukocytosis -- suspect reactive.   9. Hematuria -- will have urology to see    D/w family and with Dr. Diaz

## 2018-04-12 NOTE — PLAN OF CARE
Problem: Patient Care Overview  Goal: Plan of Care Review  Outcome: Ongoing (interventions implemented as appropriate)   04/12/18 1828   Coping/Psychosocial   Plan of Care Reviewed With patient;daughter   Plan of Care Review   Progress improving   OTHER   Outcome Summary Pt admitted from ER with ischemic stroke. NIHSS on admit is 0. Alert cooperative. Denies H/A or nausea . Passed swallow screen ate heart healthy diet without difficulty. Family at [facility]. Cardiology consulted for elevated troponin. Will cont to monitor. For MRI in AM.       Problem: Skin Injury Risk (Adult)  Goal: Identify Related Risk Factors and Signs and Symptoms  Outcome: Ongoing (interventions implemented as appropriate)   04/12/18 1828   Skin Injury Risk (Adult)   Related Risk Factors (Skin Injury Risk) fluid intake inadequate;mobility impaired       Problem: Fall Risk (Adult)  Goal: Identify Related Risk Factors and Signs and Symptoms  Outcome: Ongoing (interventions implemented as appropriate)   04/12/18 1828   Fall Risk (Adult)   Related Risk Factors (Fall Risk) age-related changes;bladder function altered;depression/anxiety;fear of falling;gait/mobility problems;homeostatic imbalance;neuro disease/injury;environment unfamiliar   Signs and Symptoms (Fall Risk) presence of risk factors       Problem: Anxiety (Adult)  Goal: Identify Related Risk Factors and Signs and Symptoms  Outcome: Ongoing (interventions implemented as appropriate)   04/12/18 1828   Anxiety (Adult)   Related Risk Factors (Anxiety) environment change;health status change;loss of control   Signs and Symptoms (Anxiety) concentration decreased;nervousness/tension/restlessness;physical complaints/symptoms;preoccupation;sleep disturbance       Problem: Stroke (Ischemic) (Adult)  Goal: Signs and Symptoms of Listed Potential Problems Will be Absent, Minimized or Managed (Stroke)  Outcome: Ongoing (interventions implemented as appropriate)   04/12/18 1828   Goal/Outcome Evaluation    Problems Assessed (Stroke (Ischemic)) all   Problems Assessed (Stroke (Ischemic)) none       Problem: Pain, Acute (Adult)  Goal: Identify Related Risk Factors and Signs and Symptoms  Outcome: Ongoing (interventions implemented as appropriate)   04/12/18 8017   Pain, Acute (Adult)   Related Risk Factors (Acute Pain) disease process;patient perception;knowledge deficit   Signs and Symptoms (Acute Pain) BADLs/IADLs reluctance/inability to perform;sleep pattern alteration

## 2018-04-13 ENCOUNTER — APPOINTMENT (OUTPATIENT)
Dept: MRI IMAGING | Facility: HOSPITAL | Age: 78
End: 2018-04-13
Attending: RADIOLOGY

## 2018-04-13 ENCOUNTER — APPOINTMENT (OUTPATIENT)
Dept: CARDIOLOGY | Facility: HOSPITAL | Age: 78
End: 2018-04-13
Attending: RADIOLOGY

## 2018-04-13 LAB
BH CV ECHO MEAS - ACS: 1.7 CM
BH CV ECHO MEAS - AO MAX PG (FULL): 1.2 MMHG
BH CV ECHO MEAS - AO MAX PG: 6.7 MMHG
BH CV ECHO MEAS - AO MEAN PG (FULL): 1 MMHG
BH CV ECHO MEAS - AO MEAN PG: 3 MMHG
BH CV ECHO MEAS - AO ROOT AREA (BSA CORRECTED): 1.8
BH CV ECHO MEAS - AO ROOT AREA: 9.6 CM^2
BH CV ECHO MEAS - AO ROOT DIAM: 3.5 CM
BH CV ECHO MEAS - AO V2 MAX: 129 CM/SEC
BH CV ECHO MEAS - AO V2 MEAN: 87.2 CM/SEC
BH CV ECHO MEAS - AO V2 VTI: 25.7 CM
BH CV ECHO MEAS - AVA(I,A): 4.3 CM^2
BH CV ECHO MEAS - AVA(I,D): 4.3 CM^2
BH CV ECHO MEAS - AVA(V,A): 2.8 CM^2
BH CV ECHO MEAS - AVA(V,D): 2.8 CM^2
BH CV ECHO MEAS - BSA(HAYCOCK): 2 M^2
BH CV ECHO MEAS - BSA: 2 M^2
BH CV ECHO MEAS - BZI_BMI: 23.8 KILOGRAMS/M^2
BH CV ECHO MEAS - BZI_METRIC_HEIGHT: 180.3 CM
BH CV ECHO MEAS - BZI_METRIC_WEIGHT: 77.6 KG
BH CV ECHO MEAS - CONTRAST EF (2CH): 67.1 ML/M^2
BH CV ECHO MEAS - CONTRAST EF 4CH: 63.6 ML/M^2
BH CV ECHO MEAS - EDV(CUBED): 110.6 ML
BH CV ECHO MEAS - EDV(MOD-SP2): 79 ML
BH CV ECHO MEAS - EDV(MOD-SP4): 66 ML
BH CV ECHO MEAS - EDV(TEICH): 107.5 ML
BH CV ECHO MEAS - EF(CUBED): 67.5 %
BH CV ECHO MEAS - EF(MOD-SP2): 67.1 %
BH CV ECHO MEAS - EF(MOD-SP4): 63.6 %
BH CV ECHO MEAS - EF(TEICH): 59 %
BH CV ECHO MEAS - ESV(CUBED): 35.9 ML
BH CV ECHO MEAS - ESV(MOD-SP2): 26 ML
BH CV ECHO MEAS - ESV(MOD-SP4): 24 ML
BH CV ECHO MEAS - ESV(TEICH): 44.1 ML
BH CV ECHO MEAS - FS: 31.3 %
BH CV ECHO MEAS - IVS/LVPW: 1.2
BH CV ECHO MEAS - IVSD: 1.1 CM
BH CV ECHO MEAS - LAT PEAK E' VEL: 9.7 CM/SEC
BH CV ECHO MEAS - LV DIASTOLIC VOL/BSA (35-75): 33.5 ML/M^2
BH CV ECHO MEAS - LV MASS(C)D: 170.2 GRAMS
BH CV ECHO MEAS - LV MASS(C)DI: 86.3 GRAMS/M^2
BH CV ECHO MEAS - LV MAX PG: 5.5 MMHG
BH CV ECHO MEAS - LV MEAN PG: 2 MMHG
BH CV ECHO MEAS - LV SYSTOLIC VOL/BSA (12-30): 12.2 ML/M^2
BH CV ECHO MEAS - LV V1 MAX: 117 CM/SEC
BH CV ECHO MEAS - LV V1 MEAN: 65.4 CM/SEC
BH CV ECHO MEAS - LV V1 VTI: 35.1 CM
BH CV ECHO MEAS - LVIDD: 4.8 CM
BH CV ECHO MEAS - LVIDS: 3.3 CM
BH CV ECHO MEAS - LVLD AP2: 7.4 CM
BH CV ECHO MEAS - LVLD AP4: 6.8 CM
BH CV ECHO MEAS - LVLS AP2: 5.9 CM
BH CV ECHO MEAS - LVLS AP4: 5.6 CM
BH CV ECHO MEAS - LVOT AREA (M): 3.1 CM^2
BH CV ECHO MEAS - LVOT AREA: 3.1 CM^2
BH CV ECHO MEAS - LVOT DIAM: 2 CM
BH CV ECHO MEAS - LVPWD: 0.9 CM
BH CV ECHO MEAS - MED PEAK E' VEL: 5.6 CM/SEC
BH CV ECHO MEAS - MV A DUR: 0.13 SEC
BH CV ECHO MEAS - MV A MAX VEL: 150 CM/SEC
BH CV ECHO MEAS - MV DEC SLOPE: 410 CM/SEC^2
BH CV ECHO MEAS - MV DEC TIME: 0.29 SEC
BH CV ECHO MEAS - MV E MAX VEL: 102 CM/SEC
BH CV ECHO MEAS - MV E/A: 0.68
BH CV ECHO MEAS - MV MAX PG: 8.2 MMHG
BH CV ECHO MEAS - MV MEAN PG: 3 MMHG
BH CV ECHO MEAS - MV P1/2T MAX VEL: 115 CM/SEC
BH CV ECHO MEAS - MV P1/2T: 82.2 MSEC
BH CV ECHO MEAS - MV V2 MAX: 143 CM/SEC
BH CV ECHO MEAS - MV V2 MEAN: 82.4 CM/SEC
BH CV ECHO MEAS - MV V2 VTI: 40.5 CM
BH CV ECHO MEAS - MVA P1/2T LCG: 1.9 CM^2
BH CV ECHO MEAS - MVA(P1/2T): 2.7 CM^2
BH CV ECHO MEAS - MVA(VTI): 2.7 CM^2
BH CV ECHO MEAS - PA ACC TIME: 0.13 SEC
BH CV ECHO MEAS - PA MAX PG (FULL): 1.4 MMHG
BH CV ECHO MEAS - PA MAX PG: 3.7 MMHG
BH CV ECHO MEAS - PA PR(ACCEL): 18.7 MMHG
BH CV ECHO MEAS - PA V2 MAX: 96.4 CM/SEC
BH CV ECHO MEAS - PULM A REVS DUR: 0.11 SEC
BH CV ECHO MEAS - PULM A REVS VEL: 36.2 CM/SEC
BH CV ECHO MEAS - PULM DIAS VEL: 20.5 CM/SEC
BH CV ECHO MEAS - PULM S/D: 1.6
BH CV ECHO MEAS - PULM SYS VEL: 31.9 CM/SEC
BH CV ECHO MEAS - PVA(V,A): 2.3 CM^2
BH CV ECHO MEAS - PVA(V,D): 2.3 CM^2
BH CV ECHO MEAS - QP/QS: 0.38
BH CV ECHO MEAS - RAP SYSTOLE: 3 MMHG
BH CV ECHO MEAS - RV MAX PG: 2.4 MMHG
BH CV ECHO MEAS - RV MEAN PG: 1 MMHG
BH CV ECHO MEAS - RV V1 MAX: 76.9 CM/SEC
BH CV ECHO MEAS - RV V1 MEAN: 49.4 CM/SEC
BH CV ECHO MEAS - RV V1 VTI: 14.7 CM
BH CV ECHO MEAS - RVOT AREA: 2.8 CM^2
BH CV ECHO MEAS - RVOT DIAM: 1.9 CM
BH CV ECHO MEAS - RVSP: 11.3 MMHG
BH CV ECHO MEAS - SI(AO): 125.3 ML/M^2
BH CV ECHO MEAS - SI(CUBED): 37.8 ML/M^2
BH CV ECHO MEAS - SI(LVOT): 55.9 ML/M^2
BH CV ECHO MEAS - SI(MOD-SP2): 26.9 ML/M^2
BH CV ECHO MEAS - SI(MOD-SP4): 21.3 ML/M^2
BH CV ECHO MEAS - SI(TEICH): 32.1 ML/M^2
BH CV ECHO MEAS - SV(AO): 247.3 ML
BH CV ECHO MEAS - SV(CUBED): 74.7 ML
BH CV ECHO MEAS - SV(LVOT): 110.3 ML
BH CV ECHO MEAS - SV(MOD-SP2): 53 ML
BH CV ECHO MEAS - SV(MOD-SP4): 42 ML
BH CV ECHO MEAS - SV(RVOT): 41.7 ML
BH CV ECHO MEAS - SV(TEICH): 63.4 ML
BH CV ECHO MEAS - TAPSE (>1.6): 2.3 CM2
BH CV ECHO MEAS - TR MAX VEL: 144 CM/SEC
BH CV XLRA - RV BASE: 3.4 CM
BH CV XLRA - TDI S': 19 CM/SEC
CHOLEST SERPL-MCNC: 145 MG/DL (ref 0–200)
CK MB SERPL-CCNC: 4.67 NG/ML
CK SERPL-CCNC: 127 U/L (ref 20–200)
DEPRECATED RDW RBC AUTO: 42.9 FL (ref 37–54)
E/E' RATIO: 14.3
ERYTHROCYTE [DISTWIDTH] IN BLOOD BY AUTOMATED COUNT: 12.4 % (ref 11.5–14.5)
GLUCOSE BLDC GLUCOMTR-MCNC: 103 MG/DL (ref 70–130)
GLUCOSE BLDC GLUCOMTR-MCNC: 88 MG/DL (ref 70–130)
HBA1C MFR BLD: 5.5 % (ref 4.8–5.6)
HCT VFR BLD AUTO: 37.3 % (ref 40.4–52.2)
HDLC SERPL-MCNC: 47 MG/DL (ref 40–60)
HGB BLD-MCNC: 12.2 G/DL (ref 13.7–17.6)
LDLC SERPL CALC-MCNC: 82 MG/DL (ref 0–100)
LDLC/HDLC SERPL: 1.75 {RATIO}
LEFT ATRIUM VOLUME INDEX: 21 ML/M2
MAXIMAL PREDICTED HEART RATE: 145 BPM
MCH RBC QN AUTO: 31.1 PG (ref 27–32.7)
MCHC RBC AUTO-ENTMCNC: 32.7 G/DL (ref 32.6–36.4)
MCV RBC AUTO: 95.2 FL (ref 79.8–96.2)
PLATELET # BLD AUTO: 240 10*3/MM3 (ref 140–500)
PMV BLD AUTO: 9.2 FL (ref 6–12)
RBC # BLD AUTO: 3.92 10*6/MM3 (ref 4.6–6)
STRESS TARGET HR: 123 BPM
TRIGL SERPL-MCNC: 79 MG/DL (ref 0–150)
TROPONIN T SERPL-MCNC: 0.44 NG/ML (ref 0–0.03)
TSH SERPL DL<=0.05 MIU/L-ACNC: 2.72 MIU/ML (ref 0.27–4.2)
VLDLC SERPL-MCNC: 15.8 MG/DL (ref 5–40)
WBC NRBC COR # BLD: 10.91 10*3/MM3 (ref 4.5–10.7)

## 2018-04-13 PROCEDURE — 97162 PT EVAL MOD COMPLEX 30 MIN: CPT | Performed by: PHYSICAL THERAPIST

## 2018-04-13 PROCEDURE — 93005 ELECTROCARDIOGRAM TRACING: CPT | Performed by: NURSE PRACTITIONER

## 2018-04-13 PROCEDURE — 82962 GLUCOSE BLOOD TEST: CPT

## 2018-04-13 PROCEDURE — 99233 SBSQ HOSP IP/OBS HIGH 50: CPT | Performed by: NURSE PRACTITIONER

## 2018-04-13 PROCEDURE — 93010 ELECTROCARDIOGRAM REPORT: CPT | Performed by: INTERNAL MEDICINE

## 2018-04-13 PROCEDURE — G8996 SWALLOW CURRENT STATUS: HCPCS

## 2018-04-13 PROCEDURE — 97110 THERAPEUTIC EXERCISES: CPT | Performed by: PHYSICAL THERAPIST

## 2018-04-13 PROCEDURE — 82553 CREATINE MB FRACTION: CPT | Performed by: INTERNAL MEDICINE

## 2018-04-13 PROCEDURE — 93306 TTE W/DOPPLER COMPLETE: CPT | Performed by: INTERNAL MEDICINE

## 2018-04-13 PROCEDURE — 82550 ASSAY OF CK (CPK): CPT | Performed by: INTERNAL MEDICINE

## 2018-04-13 PROCEDURE — 93306 TTE W/DOPPLER COMPLETE: CPT

## 2018-04-13 PROCEDURE — 84443 ASSAY THYROID STIM HORMONE: CPT | Performed by: RADIOLOGY

## 2018-04-13 PROCEDURE — 0 GADOBENATE DIMEGLUMINE 529 MG/ML SOLUTION: Performed by: INTERNAL MEDICINE

## 2018-04-13 PROCEDURE — 80061 LIPID PANEL: CPT | Performed by: RADIOLOGY

## 2018-04-13 PROCEDURE — 70553 MRI BRAIN STEM W/O & W/DYE: CPT

## 2018-04-13 PROCEDURE — 92610 EVALUATE SWALLOWING FUNCTION: CPT

## 2018-04-13 PROCEDURE — 83036 HEMOGLOBIN GLYCOSYLATED A1C: CPT | Performed by: RADIOLOGY

## 2018-04-13 PROCEDURE — 84484 ASSAY OF TROPONIN QUANT: CPT | Performed by: NURSE PRACTITIONER

## 2018-04-13 PROCEDURE — A9577 INJ MULTIHANCE: HCPCS | Performed by: INTERNAL MEDICINE

## 2018-04-13 PROCEDURE — 85027 COMPLETE CBC AUTOMATED: CPT | Performed by: RADIOLOGY

## 2018-04-13 PROCEDURE — G8997 SWALLOW GOAL STATUS: HCPCS

## 2018-04-13 RX ORDER — ASPIRIN 81 MG/1
81 TABLET ORAL DAILY
COMMUNITY
End: 2018-04-16 | Stop reason: HOSPADM

## 2018-04-13 RX ADMIN — SODIUM CHLORIDE 100 ML/HR: 9 INJECTION, SOLUTION INTRAVENOUS at 02:10

## 2018-04-13 RX ADMIN — ASPIRIN 325 MG: 325 TABLET ORAL at 08:35

## 2018-04-13 RX ADMIN — LEVOTHYROXINE SODIUM ANHYDROUS 25 MCG: 100 INJECTION, POWDER, LYOPHILIZED, FOR SOLUTION INTRAVENOUS at 11:51

## 2018-04-13 RX ADMIN — ATORVASTATIN CALCIUM 80 MG: 80 TABLET, FILM COATED ORAL at 08:35

## 2018-04-13 RX ADMIN — GADOBENATE DIMEGLUMINE 17 ML: 529 INJECTION, SOLUTION INTRAVENOUS at 11:30

## 2018-04-13 NOTE — THERAPY EVALUATION
Acute Care - Speech Language Pathology   Swallow Initial Evaluation [facility]     Patient Name: Jalen Richards  : 1940  MRN: [id number]  Today's Date: 2018  Onset of Illness/Injury or Date of Surgery: (P) 18            Admit Date: 2018    Visit Dx:     ICD-10-CM ICD-9-CM   1. Basilar artery occlusion with cerebral infarction I63.22 433.01   2. Elevated troponin R74.8 790.6   3. Gait abnormality R26.9 781.2     Patient Active Problem List   Diagnosis   • Basilar artery occlusion with cerebral infarction     Past Medical History:   Diagnosis Date   • Cancer     3-4 years ago lymph node radiation    • Disease of thyroid gland    • Hyperlipidemia    • Hypertension    • Prostate cancer     no treatment. observation by Dr Yarbrough   • Prostatic hypertrophy     followed by Dr Yarbrough     Past Surgical History:   Procedure Laterality Date   • JOINT REPLACEMENT      left hip          SWALLOW EVALUATION (last 72 hours)      SLP Adult Swallow Evaluation     Row Name 18 1438                   Rehab Evaluation    Document Type evaluation  -SA        Total Evaluation Minutes, SLP 30  -SA        Subjective Information no complaints  -SA        Patient Observations alert;cooperative;agree to therapy  -SA        Patient/Family Observations Speech is clear w/o dysarthria; vocal quality hoarse, diplophonia  -SA        Patient Effort good  -SA        Symptoms Noted During/After Treatment none  -SA           General Information    Patient Profile Reviewed yes  -SA        Precautions/Limitations, Vision WFL  -SA        Precautions/Limitations, Hearing WFL  -SA        Prior Level of Function-Communication WFL  -SA        Prior Level of Function-Swallowing no diet consistency restrictions;safe, efficient swallowing in all situations  -SA        Plans/Goals Discussed with patient  -SA        Barriers to Rehab none identified  -SA        Patient's Goals for Discharge return home  -SA           Oral  Motor and Function    Dentition Assessment natural, present and adequate  -SA        Secretion Management WNL/WFL  -SA        Mucosal Quality dry;other (see comments)   black tongue d/t radiation per pt  -SA        Volitional Swallow WFL  -SA        Volitional Cough WFL  -SA           Oral Musculature and Cranial Nerve Assessment    Oral Motor General Assessment WFL  -SA           General Eating/Swallowing Observations    Pre SpO2 (%) 92  -SA        Post SpO2 (%) 94  -SA           Clinical Swallow Eval    Oral Prep Phase WFL  -SA        Oral Transit WFL  -SA        Oral Residue WFL  -SA        Pharyngeal Phase no overt signs/symptoms of pharyngeal impairment  -SA        Esophageal Phase unremarkable  -SA        Clinical Swallow Evaluation Summary Pt w/ functional oropharyngeal swallow. Pt w/ hx throat CA w/ small collapse of the right oropharynx per CT. No overt s/s aspiration/dysphagia. Pt on regular diet/thin liquids. Feel this will be safe for him. Will follow for diet toleration and complete SLE/COG eval.   -SA           Clinical Impression    SLP Swallowing Diagnosis functional oral phase;functional pharyngeal phase  -SA        Functional Impact no impact on function  -SA        Criteria for Skilled Therapeutic Interventions Met no problems identified which require skilled intervention;other (see comments)   will monitor for diet toleration X 1 and assess for BLAYNE/SLE  -SA           Recommendations    Therapy Frequency (Swallow) evaluation only  -SA        SLP Diet Recommendation regular textures;thin liquids  -SA        Recommended Precautions and Strategies upright posture during/after eating;small bites of food and sips of liquid  -SA        SLP Rec. for Method of Medication Administration meds whole;with thin liquids;with pudding or applesauce  -SA        Monitor for Signs of Aspiration yes;notify SLP if any concerns;elevated WBC count;cough;gurgly voice;throat clearing;fever;upper  respiratory;pneumonia;right lower lobe infiltrates  -        Anticipated Dischage Disposition unknown  -           Swallow Goals (SLP)    Oral Nutrition/Hydration Goal Selection (SLP) oral nutrition/hydration, SLP goal 1  -           Oral Nutrition/Hydration Goal 1 (SLP)    Oral Nutrition/Hydration Goal 1, SLP Pt will tolerate regular/thin diet w/o clinical s/s aspiration/dysphagia  -          User Key  (r) = Recorded By, (t) = Taken By, (c) = Cosigned By    Initials Name Effective Dates     Agnes Arguelles MS CCC-SLP 04/03/18 -         EDUCATION  The patient has been educated in the following areas:   Dysphagia (Swallowing Impairment) Oral Care/Hydration.    SLP Recommendation and Plan  SLP Swallowing Diagnosis: functional oral phase, functional pharyngeal phase  SLP Diet Recommendation: regular textures, thin liquids  Recommended Precautions and Strategies: upright posture during/after eating, small bites of food and sips of liquid     Monitor for Signs of Aspiration: yes, notify SLP if any concerns, elevated WBC count, cough, gurgly voice, throat clearing, fever, upper respiratory, pneumonia, right lower lobe infiltrates     Criteria for Skilled Therapeutic Interventions Met: no problems identified which require skilled intervention, other (see comments) (will monitor for diet toleration X 1 and assess for BLAYNE/SLE)  Anticipated Dischage Disposition: unknown     Therapy Frequency (Swallow): evaluation only          Plan of Care Reviewed With: patient  Plan of Care Review  Plan of Care Reviewed With: patient  Outcome Summary: Completed BSE. No overt s/s aspiration or dysphagia. Of note, pt w/ hx carcinoma of neck & some collapse of R oropharynx per CT. Pt on reg/thin diet. Feel this is safe for him. Will follow for diet daxa X 1 and  SLE/BLAYNE eval.          SLP GOALS     Row Name 04/13/18 4863             Oral Nutrition/Hydration Goal 1 (SLP)    Oral Nutrition/Hydration Goal 1, SLP Pt will tolerate  regular/thin diet w/o clinical s/s aspiration/dysphagia  -        User Key  (r) = Recorded By, (t) = Taken By, (c) = Cosigned By    Initials Name Provider Type    SA Agnes Arguelles MS CCC-SLP Speech and Language Pathologist             SLP Outcome Measures (last 72 hours)      SLP Outcome Measures     Row Name 04/13/18 1400             SLP Outcome Measures    Outcome Measure Used? Adult NOMS  -         FCM Scores    Swallowing FCM Score 7  -        User Key  (r) = Recorded By, (t) = Taken By, (c) = Cosigned By    Initials Name Effective Dates    SA Agnes Arguelles MS CCC-SLP 04/03/18 -            Time Calculation:         Time Calculation- SLP     Row Name 04/13/18 1450             Time Calculation- SLP    SLP Start Time 1415  -      SLP Stop Time 1445  -      SLP Time Calculation (min) 30 min  -      SLP Received On 04/13/18  -        User Key  (r) = Recorded By, (t) = Taken By, (c) = Cosigned By    Initials Name Provider Type    SA Agnes Arguelles MS CCC-SLP Speech and Language Pathologist          Therapy Charges for Today     Code Description Service Date Service Provider Modifiers Qty    13380519669 HC ST SWALLOWING CURRENT STATUS 4/13/2018 Agnes Arguelles MS CCC-SLP GN, CH 1    19583197670 HC ST SWALLOWING PROJECTED 4/13/2018 Agnes Arguelles MS CCC-SLP GN, CH 1    36414362929 HC ST EVAL ORAL PHARYNG SWALLOW 2 4/13/2018 Agnes Arguelles MS CCC-SLP GN, KX 1          SLP G-Codes  SLP NOMS Used?: Yes  Functional Limitations: Swallowing  Swallow Current Status (): 0 percent impaired, limited or restricted  Swallow Goal Status (): 0 percent impaired, limited or restricted    Agnes Arguelles MS CCC-SLP  4/13/2018

## 2018-04-13 NOTE — PROGRESS NOTES
[facility]     Mar/chart reviewed  F/u Acute CVA  Doing well no complaints.  Has history of prostate CA and history of head and neck ca    Vital Sign Min/Max for last 24 hours  Temp  Min: 98.5 °F (36.9 °C)  Max: 98.5 °F (36.9 °C)   BP  Min: 117/71  Max: 179/98   Pulse  Min: 67  Max: 92   Resp  Min: 15  Max: 22   SpO2  Min: 91 %  Max: 97 %   Flow (L/min)  Min: 2  Max: 2   Weight  Min: 78 kg (171 lb 15.3 oz)  Max: 81.6 kg (180 lb)     Nad, axox3,   perrl, eomi, no icterus,  mmm, no jvd, trachea midline, neck supple,  chest cta bilaterally, no crackles, no wheezes,   rrr,   soft, nt, nd +bs,  no c/c/e    Labs:  Wbc 10.9  hgb 12  plts 240  Trop 0.44    A/P:  1. Acute CVA -- care as per protocol; DR. Lopez following.   2. Basilar artery occlusion  3. Carotid artery stenosis  4. Hypertension -  5. Hypothyroidism -- continue replacement  6. Tobacco abuse -- discussed cessation  7. Elevated troponin -- will have cards to see  8. Leukocytosis -- suspect reactive.   9. Hematuria --urology following, he has h/o prostate CA    Can move out of ICU if ok with neuro.

## 2018-04-13 NOTE — PROGRESS NOTES
Neurology has requested a SHANNON.  Unfortunately this cannot be arranged for today at this point in time and he ate breakfast.  We can do this on Monday.    His Tn curve is flat.  His CPK is normal, as is his MB-index.  His EKG is normal.  I highly suspect the elevated Tn is due to his stroke.    Continue to observe on telemetry.  We will see again Monday.

## 2018-04-13 NOTE — PLAN OF CARE
Problem: Patient Care Overview  Goal: Plan of Care Review  Outcome: Ongoing (interventions implemented as appropriate)   04/13/18 1309   Coping/Psychosocial   Plan of Care Reviewed With patient   OTHER   Outcome Summary Pt presents to the hospital with slurred speech and L LE weakness following basilar artery occlusion w/ cerebral infarction. Pt independent with ambulation, mobility, and ADLs at baseline. Pt with good strength and no differences noted side to side. Pt with no neuro defecits noted during exam. Pt able to ambulate 240 with rolling walker and 250 with HHA from PT for balance. Pt reports feeling somewhat unsteady due to being in the bed the past 2 days. Pt may benefit from PT in order to increase independence in mobility to return to baseline function.

## 2018-04-13 NOTE — PLAN OF CARE
Problem: Patient Care Overview  Goal: Plan of Care Review   04/13/18 2943   Coping/Psychosocial   Plan of Care Reviewed With patient   OTHER   Outcome Summary Completed BSE. No overt s/s aspiration or dysphagia. Of note, pt w/ hx carcinoma of neck & some collapse of R oropharynx per CT. Pt on reg/thin diet. Feel this is safe for him. Will follow for diet daxa X 1 and SLE/BLAYNE eval.

## 2018-04-13 NOTE — PROGRESS NOTES
Discharge Planning Assessment  [facility]     Patient Name: Jalen Richards  MRN: [id number]  Today's Date: 4/13/2018    Admit Date: 4/12/2018          Discharge Needs Assessment     Row Name 04/13/18 1850       Living Environment    Lives With spouse    Name(s) of Who Lives With Patient pt's wife Katelyn has alzheimer's.  Pt's daughter Grace and son-in-law Baljit live 5 minutes away and also help.    Current Living Arrangements home/apartment/condo    Primary Care Provided by self    Provides Primary Care For spouse    Caregiving Concerns spouse has dementia    Family Caregiver if Needed none    Family Caregiver Names pt's daughter and ORA are supportive    Quality of Family Relationships supportive    Able to Return to Prior Arrangements yes       Resource/Environmental Concerns    Resource/Environmental Concerns none    Transportation Concerns car, none       Transition Planning    Patient/Family Anticipates Transition to home with family    Patient/Family Anticipated Services at Transition none    Transportation Anticipated car, drives self       Discharge Needs Assessment    Concerns to be Addressed denies needs/concerns at this time    Concerns Comments refuses home health and walker at this time.  States will decide when discharged.    Equipment Currently Used at Home none    Anticipated Changes Related to Illness none    Equipment Needed After Discharge walker, standard   pt states that he may have one at home    Offered/Gave Vendor List yes    Current Discharge Risk dependent with mobility/activities of daily living            Discharge Plan     Row Name 04/13/18 1853       Plan    Plan Home with family support.  Refuses HH at this time.    Patient/Family in Agreement with Plan yes    Plan Comments Spoke with pt and his son-in-law Baljit at bedside.  Introduced self and explained role.  CCP contact information provided.  Face sheet and pharmacy verified and iMM received.  Pt states that he has never used  HH or SNF.  He denies any needs at this time.  He refuses any referrals at this time.  Pt's son-in-law states that they will wait until pt is discharged and then decide.  Pt's wife has dementia but pt's daughter and ORA live 5 minutes away if they have needs.  CCP will follow.        Destination     No service coordination in this encounter.      Durable Medical Equipment     No service coordination in this encounter.      Dialysis/Infusion     No service coordination in this encounter.      Home Medical Care     No service coordination in this encounter.      Social Care     No service coordination in this encounter.                Demographic Summary     Row Name 04/13/18 5470       General Information    Admission Type inpatient    Arrived From home    Required Notices Provided Important Message from Medicare    Referral Source admission list    Reason for Consult discharge planning    Preferred Language English     Used During This Interaction no            Functional Status     Row Name 04/13/18 6150       Functional Status    Usual Activity Tolerance good    Current Activity Tolerance moderate       Functional Status, IADL    Medications independent    Meal Preparation independent    Housekeeping independent    Laundry independent    Shopping independent       Mental Status    General Appearance WDL WDL       Mental Status Summary    Recent Changes in Mental Status/Cognitive Functioning no changes       Employment/    Employment Status retired            Psychosocial    No documentation.           Abuse/Neglect    No documentation.           Legal    No documentation.           Substance Abuse    No documentation.           Patient Forms    No documentation.         Tanya Yousif RN

## 2018-04-13 NOTE — SIGNIFICANT NOTE
04/13/18 1015   Rehab Time/Intention   Evaluation Not Performed other (see comments)  (RN asked for PT to check back in PM as he is going for MRI.)   Rehab Treatment   Discipline physical therapist   Recommendation   PT - Next Appointment 04/13/18

## 2018-04-13 NOTE — PLAN OF CARE
"Problem: Patient Care Overview  Goal: Plan of Care Review  Outcome: Ongoing (interventions implemented as appropriate)   04/13/18 1726   Coping/Psychosocial   Plan of Care Reviewed With patient   OTHER   Outcome Summary MRI this am showed multiple infarcts, unable to have SHANNON after having eaten breakfast. NIHSS remains 0. Amb in [place] with PT daxa well. Sat in chair for lunch. Up to commode with assist x1. Still voiding small amts in urinal but incontinent in brief. Urine sill michelle in color, family states \"its still bloody\" Dr Yarbrough saw pt this am will see in FU. Ready for transfer to floor.       Problem: Skin Injury Risk (Adult)  Goal: Identify Related Risk Factors and Signs and Symptoms  Outcome: Ongoing (interventions implemented as appropriate)   04/13/18 1726   Skin Injury Risk (Adult)   Related Risk Factors (Skin Injury Risk) advanced age;moisture       Problem: Fall Risk (Adult)  Goal: Identify Related Risk Factors and Signs and Symptoms  Outcome: Ongoing (interventions implemented as appropriate)   04/13/18 1726   Fall Risk (Adult)   Related Risk Factors (Fall Risk) bladder function altered;gait/mobility problems;inadequate lighting;objects hard to reach;neuro disease/injury;environment unfamiliar   Signs and Symptoms (Fall Risk) presence of risk factors       Problem: Anxiety (Adult)  Goal: Identify Related Risk Factors and Signs and Symptoms   04/13/18 1726   Anxiety (Adult)   Related Risk Factors (Anxiety) environment change;prognosis/treatment plan   Signs and Symptoms (Anxiety) apprehension/being worried;nervousness/tension/restlessness;sleep disturbance       Problem: Stroke (Ischemic) (Adult)  Goal: Signs and Symptoms of Listed Potential Problems Will be Absent, Minimized or Managed (Stroke)   04/13/18 1726   Goal/Outcome Evaluation   Problems Assessed (Stroke (Ischemic)) all   Problems Assessed (Stroke (Ischemic)) situational response       Problem: Pain, Acute (Adult)  Goal: Identify Related Risk " Factors and Signs and Symptoms  Outcome: Ongoing (interventions implemented as appropriate)   04/13/18 0386   Pain, Acute (Adult)   Related Risk Factors (Acute Pain) disease process   Signs and Symptoms (Acute Pain) guarding/abnormal posturing/positioning;sleep pattern alteration

## 2018-04-13 NOTE — PROGRESS NOTES
"DOS: 2018  NAME: Jalen Richards   : 1940  PCP: No Known Provider  Chief Complaint   Patient presents with   • Dizziness     episode of difficulty speaking and walking.  sx have improved.     Stroke    Historian: Nursing and Patient     Subjective: No events overnight.    Patient denies HA, double/blurred vision, dizziness, numbness or loss of sensation or any other new neurological complaints at this time.       Objective:  Vital signs: /71   Pulse 74   Temp 98.5 °F (36.9 °C) (Oral)   Resp 19   Ht 180.3 cm (71\")   Wt 78 kg (171 lb 15.3 oz)   SpO2 94%   BMI 23.98 kg/m²       HEENT: Normocephalic, atraumatic   COR: RRR  Resp: Even and unlabored  Extremities: Equal pulses, non distal embolization    Neurological:   MS: AO. Language normal. No neglect. Higher integrative function normal  CN: II-XII normal (questionabl Left superior temporal visual filed cut)   Motor: 5/5, normal tone  Sensory: Intact  Coordination: Normal  NIHSS 0    Laboratory results:  Lab Results   Component Value Date    GLUCOSE 122 (H) 2018    CALCIUM 9.3 2018     2018    K 4.0 2018    CO2 27.3 2018    CL 96 (L) 2018    BUN 18 2018    CREATININE 0.97 2018    EGFRIFNONA 75 2018    BCR 18.6 2018    ANIONGAP 14.7 2018     Lab Results   Component Value Date    WBC 10.91 (H) 2018    HGB 12.2 (L) 2018    HCT 37.3 (L) 2018    MCV 95.2 2018     2018     Lab Results   Component Value Date    CHOL 145 2018     Lab Results   Component Value Date    HDL 47 2018     Lab Results   Component Value Date    LDL 82 2018     Lab Results   Component Value Date    TRIG 79 2018     Lab Results   Component Value Date    TSH 2.720 2018     No results found for: [id number]  Lab Results   Component Value Date    CHOL 145 2018    TRIG 79 2018    HDL 47 2018    LDL 82 2018     Lab " Results   Component Value Date    HGBA1C 5.50 04/13/2018       Review and interpretation of imaging:  CT angiogram head and neck:There is calcification of the aortic arch with an aneurysm medially of the proximal descending aorta, and subclavian arteries are patent and there is a severe stenosis of the origin of the left vertebral artery which is slightly smaller in the right, there is severe bulky plaque left internal carotid artery worse than right with ulceration, intracranially the left vertebral artery appears to have a severe stenosis as it penetrates the dura, the basilar artery is patent proximally but is flush occluded in its terminus, there is calcification of the intracranial internal carotid arteries and bilateral straight communicating arteries filling the basilar terminus and posterior cerebral arteries     CT scan of the brain is a subtle hypodensity in the medial right occipital lobe that could represent acute to subacute stroke, there is an old stroke in the left occipital parietal lobe, is a dense calcification in the left subcortical frontal white matter likely representing dystrophic calcification or a cavernous angioma     TTE   EF 63.6%  No PFO present. No atrial septal defect noted. No mention of hypokinesia. Saline results negative.   LA Volume Index 21    Interpretation Summary     · Left ventricular systolic function is normal. Calculated EF = 63.6%. Estimated EF was in disagreement with the calculated EF. Estimated EF appears to be in the range of 66 - 70%. Normal left ventricular cavity size noted. All left ventricular wall segments contract normally. Left ventricular wall thickness is consistent with mild concentric hypertrophy. Left ventricular diastolic dysfunction is noted (grade I) consistent with impaired relaxation.  · Normal left atrial size noted. Saline test results are negative.          MRI Brain Multiple strokes in multiple arterial distributions    Impression: This is a 77  yo gentle who presented to [facility] 04/12 with complaint of disorientation, dizziness, vertiginous, slurred speech which started at 8:30 am; patient presented @ 11:am. NIHSS on arrival was 2 making him not a rtPA candidate. CTA revealed basilar artery occlusion which is thought most likely an embolic possibly artery to artery origin although a cardioembolic source cannot be excluded. TTE unremarkable (results above). MRI Brain revealed multiple strokes in multiple arterial distributions. Based on MRI findings we will ask Cardiology to complete SHANNON. PT/OT/ST. CCP to assist with discharge planning. Ok to move to 5N, 5S or 5P.     Plan:  Smoking Cessation Information Provided.   Ready to quit: No  Counseling given: Yes  Aspirin 325mg  Neurochecks  Check Lipid panel, Hgb A1C (results above)   Lipitor 80mg  Non-pharmacological DVT prophylaxis  TTE (results of above)   CT/CTA/CTP stat (done)  MRI (pending)   EKG Tele  PT/OT/ST  Stroke Education  Blood pressure control to <130/80  Goal LDL <70-recommend high dose statins-             Serum glucose < 140    All imaging and labs reviewed with Dr. Gordon and he agrees with radiology impressions and plan.

## 2018-04-13 NOTE — CONSULTS
UROLOGY CONSULTATION    CONSULTING PHYSICIAN: Davis Yarbrough MD    REASON FOR CONSULTATION: Gross hematuria.     HISTORY OF PRESENT ILLNESS: This patient is well known to me. He has a history of prostate cancer and is undergoing active surveillance. His original biopsy was performed in 12/2015 and showed a small volume of low-grade prostate cancer. Repeat biopsy performed in 06/2017 revealed an area suspicious for but not diagnostic for cancer. The patient also has baseline voiding symptoms, particularly nocturia x 2-3, urgency, and urge incontinence. He states symptoms are stable and of longstanding duration and were not improved with Flomax. He is currently under observation for these symptoms. He presents now complaining of hematuria. He states this began 4 or 5 days ago. His urine is clear today. He states he has not passed any clots and has not had any associated increase in his voiding symptoms. A recent urine culture in late 02/2018 in my office was sterile.     PAST MEDICAL HISTORY:   1. Above-mentioned prostate cancer.   2. History of thyroid disease.   3. Hyperlipidemia.   4. Hypertension.     SOCIAL HISTORY: The patient is a past smoker. The patient denies any drug or alcohol use.     PHYSICAL EXAMINATION:   GENERAL: He is a well-nourished, well-developed white male in no apparent distress. Alert and oriented x 3.   COR: Regular rate and rhythm. No S3, S4, murmur or rubs.   LUNGS: Clear to auscultation.   ABDOMEN/FLANKS: Benign.   GENITOURINARY: External genitalia are normal.  RECTAL: Reveals a moderately enlarged but benign-feeling prostate     ASSESSMENT AND RECOMMENDATIONS:   1. Prostate cancer. The patient is currently under observation for his prostate cancer. No active therapy is warranted for this condition at this time.   2. Benign prostatic hypertrophy. The patient has a history of BPH. He denies any exacerbation of his voiding symptoms. I have recommended continued observation for his symptoms  of BPH.   3. New onset gross hematuria. The patient was noted to have significant hematuria without pyuria on his admission. A urine culture is currently pending. He does not appear to have an active urinary tract infection at this time. We will follow the patient's urine cultures. Further evaluation of his hematuria can be performed as an outpatient depending on the results of his urine culture.     I will follow the patient along. Thank you very much for asking me to help in his care.

## 2018-04-13 NOTE — SIGNIFICANT NOTE
04/13/18 1055   Rehab Time/Intention   Evaluation Not Performed other (see comments)  (Pt off the floor in MRI; spoke to RN; will check back this pm )   Rehab Treatment   Discipline speech language pathologist

## 2018-04-13 NOTE — SIGNIFICANT NOTE
04/13/18 0850   Rehab Time/Intention   Evaluation Not Performed other (see comments)  (per [place] pt. awaiting MRI results, possible see this afternoon.  OT will see patient this afternoon ,if the schedule allows. )   Rehab Treatment   Discipline occupational therapist   Recommendation   OT - Next Appointment 04/13/18

## 2018-04-13 NOTE — PROGRESS NOTES
Clinical Pharmacy Services: Medication History    Jalen Richards is a 77 y.o. 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    Allergies as of 04/12/2018   • (No Known Allergies)       Medication information was obtained from: Patient and pharmacy  Pharmacy and Phone Number: [facility] [phone]    Prior to Admission Medications     Prescriptions Last Dose Informant Patient Reported? Taking?    amLODIPine (NORVASC) 5 MG tablet   Yes Yes    Take 5 mg by mouth Daily.    aspirin 81 MG EC tablet  Self Yes Yes    Take 81 mg by mouth Daily.    atorvastatin (LIPITOR) 20 MG tablet   Yes Yes    Take 20 mg by mouth Daily.    levothyroxine (SYNTHROID, LEVOTHROID) 50 MCG tablet   Yes Yes    Take 50 mcg by mouth Daily.    lisinopril-hydrochlorothiazide (PRINZIDE,ZESTORETIC) 20-25 MG per tablet   Yes Yes    Take 1 tablet by mouth Daily.            Medication notes: N/A    This medication list is complete to the best of my knowledge as of 4/13/2018    Please call if questions.    [phone]  4/13/2018 12:57 PM

## 2018-04-13 NOTE — THERAPY EVALUATION
Acute Care - Physical Therapy Initial Evaluation  [facility]     Patient Name: Jalen Richards  : 1940  MRN: [id number]  Today's Date: 2018   Onset of Illness/Injury or Date of Surgery: (P) 18            Admit Date: 2018    Visit Dx:     ICD-10-CM ICD-9-CM   1. Basilar artery occlusion with cerebral infarction I63.22 433.01   2. Elevated troponin R74.8 790.6   3. Gait abnormality R26.9 781.2     Patient Active Problem List   Diagnosis   • Basilar artery occlusion with cerebral infarction     Past Medical History:   Diagnosis Date   • Cancer     3-4 years ago lymph node radiation    • Disease of thyroid gland    • Hyperlipidemia    • Hypertension    • Prostate cancer     no treatment. observation by Dr Yarbrough   • Prostatic hypertrophy     followed by Dr Yarbrough     Past Surgical History:   Procedure Laterality Date   • JOINT REPLACEMENT      left hip        PT ASSESSMENT (last 12 hours)      Physical Therapy Evaluation     Row Name 18 1422          PT Evaluation Time/Intention    Subjective Information (P)  no complaints  -PV     Document Type (P)  evaluation  -PV     Mode of Treatment (P)  physical therapy   Student  -PV     Patient Effort (P)  good  -PV     Symptoms Noted During/After Treatment (P)  dizziness  -PV     Row Name 18 1422          General Information    Onset of Illness/Injury or Date of Surgery (P)  18  -PV     Patient Observations (P)  agree to therapy  -PV     General Observations of Patient (P)  Pt found supine in bed with no acute distress noted. family bedside  -PV     Prior Level of Function (P)  independent:  -PV     Equipment Currently Used at Home (P)  none  -PV     Pertinent History of Current Functional Problem (P)  Pt admitted with slurred speech and L LE weakness. Pt with basilar artery occlusion with cerebral infarction. Hx of hypertension, hyperlipidemia, hyperthyroidism, and prostate cancer.   -PV     Existing Precautions/Restrictions  (P)  fall  -PV     Barriers to Rehab (P)  medically complex  -PV     Row Name 04/13/18 1422          Relationship/Environment    Lives With (P)  spouse  -PV     Row Name 04/13/18 1422          Resource/Environmental Concerns    Current Living Arrangements (P)  home/apartment/condo  -PV     Resource/Environmental Concerns (P)  --  -PV     Row Name 04/13/18 1422          Cognitive Assessment/Interventions    Additional Documentation (P)  Cognitive Assessment/Intervention (Group)  -PV     Row Name 04/13/18 1422          Cognitive Assessment/Intervention- PT/OT    Affect/Mental Status (Cognitive) (P)  WFL  -PV     Orientation Status (Cognition) (P)  oriented x 4  -PV     Follows Commands (Cognition) (P)  WFL  -PV     Cognitive Function (Cognitive) (P)  WFL  -PV     Personal Safety Interventions (P)  fall prevention program maintained;gait belt;nonskid shoes/slippers when out of bed  -PV     Row Name 04/13/18 1422          Safety Issues, Functional Mobility    Impairments Affecting Function (Mobility) (P)  balance  -PV     Row Name 04/13/18 1422          Bed Mobility Assessment/Treatment    Bed Mobility Assessment/Treatment (P)  supine-sit;sit-supine  -PV     Supine-Sit [place] (Bed Mobility) (P)  supervision  -PV     Sit-Supine [place] (Bed Mobility) (P)  supervision  -PV     Row Name 04/13/18 1422          Transfer Assessment/Treatment    Transfer Assessment/Treatment (P)  sit-stand transfer;stand-sit transfer  -PV     Sit-Stand [place] (Transfers) (P)  supervision  -PV     Stand-Sit [place] (Transfers) (P)  supervision  -PV     Row Name 04/13/18 1422          Gait/Stairs Assessment/Training    Gait/Stairs Assessment/Training (P)  gait/ambulation independence  -PV     [place] Level (Gait) (P)  stand by assist;contact guard  -PV     Assistive Device (Gait) (P)  walker, front-wheeled;other (see comments)   HHA  -PV     Distance in Feet (Gait) (P)  490 ft   240 w/ rolling walker; 250 w/ HHA  -PV      Pattern (Gait) (P)  step-through  -PV     Deviations/Abnormal Patterns (Gait) (P)  gait speed decreased;stride length decreased  -PV     Comment (Gait/Stairs) (P)  Pt reports some dizziness and feeling a little unsteady on his feet due to being in the bed the last couple of days.   -PV     Row Name 04/13/18 1422          General ROM    GENERAL ROM COMMENTS (P)  ROM WFL  -PV     Row Name 04/13/18 1422          General Assessment (Manual Muscle Testing)    General Manual Muscle Testing (MMT) Assessment (P)  no strength deficits identified  -PV     Comment, General Manual Muscle Testing (MMT) Assessment (P)  No strength differences L to R  -PV     Row Name 04/13/18 1422          Motor Assessment/Intervention    Additional Documentation (P)  Balance (Group)  -PV     Row Name 04/13/18 1422          Balance    Balance (P)  static standing balance;dynamic standing balance  -PV     Row Name 04/13/18 1422          Static Standing Balance    Level of [place] (Supported Standing, Static Balance) (P)  standby assist  -PV     Row Name 04/13/18 1422          Dynamic Standing Balance    Level of [place], Reaches Outside Midline (Standing, Dynamic Balance) (P)  contact guard assist  -PV     Row Name 04/13/18 1422          Plan of Care Review    Plan of Care Reviewed With (P)  patient  -PV     Row Name 04/13/18 1422          Physical Therapy Clinical Impression    Patient/Family Goals Statement (PT Clinical Impression) (P)  Pt to return home at prior level of function  -PV     Criteria for Skilled Interventions Met (PT Clinical Impression) (P)  treatment indicated  -PV     Impairments Found (describe specific impairments) (P)  gait, locomotion, and balance  -PV     Rehab Potential (PT Clinical Summary) (P)  good, to achieve stated therapy goals  -PV     Row Name 04/13/18 1422          Vital Signs    Pre Systolic BP Rehab (P)  159  -PV     Pre Treatment Diastolic BP (P)  82  -PV     Post Systolic BP Rehab (P)  153  -PV      Post Treatment Diastolic BP (P)  74  -PV     Pretreatment Heart Rate (beats/min) (P)  77  -PV     Posttreatment Heart Rate (beats/min) (P)  75  -PV     Pre SpO2 (%) (P)  93  -PV     O2 Delivery Pre Treatment (P)  room air  -PV     Post SpO2 (%) (P)  94  -PV     O2 Delivery Post Treatment (P)  room air  -PV     Row Name 04/13/18 1422          Physical Therapy Goals    Gait Training Goal Selection (PT) (P)  gait training, PT goal 1  -PV     Stairs Goal Selection (PT) (P)  stairs, PT goal 1  -PV     Additional Documentation (P)  Stairs Goal Selection (PT) (Row)  -PV     Row Name 04/13/18 1422          Gait Training Goal 1 (PT)    Activity/Assistive Device (Gait Training Goal 1, PT) (P)  gait (walking locomotion)  -PV     Nelson Level (Gait Training Goal 1, PT) (P)  supervision required  -PV     Distance (Gait Goal 1, PT) (P)  500  -PV     Time Frame (Gait Training Goal 1, PT) (P)  1 week  -PV     Row Name 04/13/18 1422          Stairs Goal 1 (PT)    Activity/Assistive Device (Stairs Goal 1, PT) (P)  ascending stairs;descending stairs;using handrail  -PV     [place] Level/Cues Needed (Stairs Goal 1, PT) (P)  supervision required  -PV     Number of Stairs (Stairs Goal 1, PT) (P)  12  -PV     Time Frame (Stairs Goal 1, PT) (P)  1 week  -PV     Row Name 04/13/18 1422          Positioning and Restraints    Pre-Treatment Position (P)  in bed  -PV     Post Treatment Position (P)  bed  -PV     In Bed (P)  supine;call light within reach;encouraged to call for assist;with family/caregiver  -PV       User Key  (r) = Recorded By, (t) = Taken By, (c) = Cosigned By    Initials Name Provider Type    PV Koby Horton, PT Student PT Student          Physical Therapy Education     Title: PT OT SLP Therapies (Done)     Topic: Physical Therapy (Done)     Point: Mobility training (Done)    Learning Progress Summary     Learner Status Readiness Method Response Comment Documented by    Patient Done Acceptance E,TB VU,DU  PV  04/13/18 1435          Point: Home exercise program (Done)    Learning Progress Summary     Learner Status Readiness Method Response Comment Documented by    Patient Done Acceptance GEMA CABALLERO DU  PV 04/13/18 1435          Point: Body mechanics (Done)    Learning Progress Summary     Learner Status Readiness Method Response Comment Documented by    Patient Done Acceptance GEMA CABALLERO DU  PV 04/13/18 1435          Point: Precautions (Done)    Learning Progress Summary     Learner Status Readiness Method Response Comment Documented by    Patient Done Acceptance E,JIMMY GRANADOS  PV 04/13/18 1435                      User Key     Initials Effective Dates Name Provider Type Discipline     03/07/18 -  Koby Horton, PT Student PT Student PT                PT Recommendation and Plan  Anticipated Discharge Disposition (PT): (P) home or self care, home with home health care  Planned Therapy Interventions (PT Eval): (P) balance training, gait training, home exercise program, patient/family education, bed mobility training, strengthening, stair training  Therapy Frequency (PT Clinical Impression): (P) daily  Outcome Summary/Treatment Plan (PT)  Anticipated Discharge Disposition (PT): (P) home or self care, home with home health care  Plan of Care Reviewed With: (P) patient  Outcome Summary: (P) Pt presents to the hospital with slurred speech and L LE weakness following basilar artery occlusion w/ cerebral infarction. Pt independent with ambulation, mobility, and ADLs at baseline. Pt with good strength and no differences noted side to side. Pt with no neuro defecits noted during exam. Pt able to ambulate 240 with rolling walker and 250 with HHA from PT for balance. Pt reports feeling somewhat unsteady due to being in the bed the past 2 days. Pt may benefit from PT in order to increase independence in mobility to return to baseline function.           Outcome Measures     Row Name 04/13/18 1400             How much help from another person  do you currently need...    Turning from your back to your side while in flat bed without using bedrails? (P)  4  -PV      Moving from lying on back to sitting on the side of a flat bed without bedrails? (P)  4  -PV      Moving to and from a bed to a chair (including a wheelchair)? (P)  4  -PV      Standing up from a chair using your arms (e.g., wheelchair, bedside chair)? (P)  4  -PV      Climbing 3-5 steps with a railing? (P)  3  -PV      To walk in hospital room? (P)  3  -PV      AM-PAC 6 Clicks Score (P)  22  -PV         Functional Assessment    Outcome Measure Options (P)  AM-PAC 6 Clicks Basic Mobility (PT)  -PV        User Key  (r) = Recorded By, (t) = Taken By, (c) = Cosigned By    Initials Name Provider Type    PV Koby Horton, PT Student PT Student           Time Calculation:         PT Charges     Row Name 04/13/18 1439 04/13/18 1015          Time Calculation    Start Time (P)  1347  -PV  --     Stop Time (P)  1405  -PV  --     Time Calculation (min) (P)  18 min  -PV  --     PT Received On (P)  04/13/18  -PV  --     PT - Next Appointment (P)  04/14/18  -PV 04/13/18  -     PT Goal Re-Cert Due Date (P)  04/20/18  -PV  --       User Key  (r) = Recorded By, (t) = Taken By, (c) = Cosigned By    Initials Name Provider Type    DARNELL Sotomayor, PT Physical Therapist    CAROLE Horton, PT Student PT Student          Therapy Charges for Today     Code Description Service Date Service Provider Modifiers Qty    84292519371  PT THER PROC EA 15 MIN 4/13/2018 Koby Horton, PT Student GP 1    [id number] HC PT EVAL MOD COMPLEXITY 2 4/13/2018 Koby Horton, PT Student GP 1          PT G-Codes  Outcome Measure Options: (P) AM-PAC 6 Clicks Basic Mobility (PT)      Koby Horton PT Student  4/13/2018

## 2018-04-14 LAB
BACTERIA SPEC AEROBE CULT: NO GROWTH
VIT B12 BLD-MCNC: 507 PG/ML (ref 211–946)

## 2018-04-14 PROCEDURE — 97110 THERAPEUTIC EXERCISES: CPT

## 2018-04-14 PROCEDURE — 99233 SBSQ HOSP IP/OBS HIGH 50: CPT | Performed by: PSYCHIATRY & NEUROLOGY

## 2018-04-14 RX ORDER — LEVOTHYROXINE SODIUM 0.05 MG/1
50 TABLET ORAL
Status: DISCONTINUED | OUTPATIENT
Start: 2018-04-14 | End: 2018-04-16 | Stop reason: HOSPADM

## 2018-04-14 RX ORDER — AMLODIPINE BESYLATE 10 MG/1
10 TABLET ORAL
Status: DISCONTINUED | OUTPATIENT
Start: 2018-04-14 | End: 2018-04-16 | Stop reason: HOSPADM

## 2018-04-14 RX ADMIN — APIXABAN 5 MG: 5 TABLET, FILM COATED ORAL at 11:38

## 2018-04-14 RX ADMIN — LISINOPRIL: 20 TABLET ORAL at 14:17

## 2018-04-14 RX ADMIN — LEVOTHYROXINE SODIUM 50 MCG: 50 TABLET ORAL at 11:38

## 2018-04-14 RX ADMIN — ATORVASTATIN CALCIUM 80 MG: 80 TABLET, FILM COATED ORAL at 07:49

## 2018-04-14 RX ADMIN — AMLODIPINE BESYLATE 10 MG: 10 TABLET ORAL at 11:38

## 2018-04-14 RX ADMIN — APIXABAN 5 MG: 5 TABLET, FILM COATED ORAL at 20:29

## 2018-04-14 RX ADMIN — ASPIRIN 325 MG: 325 TABLET ORAL at 07:49

## 2018-04-14 NOTE — PLAN OF CARE
Problem: Patient Care Overview  Goal: Plan of Care Review   04/14/18 1008   OTHER   Outcome Summary Pt is improving with mobility. Pt did not use an assitive device during gait and completed seated LE exercises.

## 2018-04-14 NOTE — PLAN OF CARE
Problem: Patient Care Overview  Goal: Plan of Care Review  Outcome: Ongoing (interventions implemented as appropriate)   04/14/18 3009   Coping/Psychosocial   Plan of Care Reviewed With patient;spouse;daughter   Plan of Care Review   Progress improving   OTHER   Outcome Summary Pt. improving. More steady. Still stand by assist , VSS Possible D/C tomorrow

## 2018-04-14 NOTE — PLAN OF CARE
Problem: Patient Care Overview  Goal: Plan of Care Review   04/13/18 1726 04/14/18 0656   Coping/Psychosocial   Plan of Care Reviewed With patient --    Plan of Care Review   Progress --  no change   OTHER   Outcome Summary --  Vss stable, up with stand by assist, reenforced importantance of using call light , needs assistance to get out of bed, possible telemetry overflow today, will continue to monitor

## 2018-04-14 NOTE — THERAPY TREATMENT NOTE
Acute Care - Physical Therapy Treatment Note  [facility]     Patient Name: Jalen Richards  : 1940  MRN: [id number]  Today's Date: 2018  Onset of Illness/Injury or Date of Surgery: 18          Admit Date: 2018    Visit Dx:    ICD-10-CM ICD-9-CM   1. Basilar artery occlusion with cerebral infarction I63.22 433.01   2. Elevated troponin R74.8 790.6   3. Gait abnormality R26.9 781.2     Patient Active Problem List   Diagnosis   • Basilar artery occlusion with cerebral infarction       Therapy Treatment          Rehabilitation Treatment Summary     Row Name 18 1000             Treatment Time/Intention    Discipline physical therapist  -KH      Document Type therapy note (daily note)  -KH      Subjective Information no complaints  -KH      Mode of Treatment physical therapy  -KH      Total Minutes, Physical Therapy Treatment 16  -KH      Therapy Frequency (PT Clinical Impression) daily  -KH      Patient Effort excellent  -KH      Existing Precautions/Restrictions fall  -KH      Recorded by [KH] Prachi Kaur, PT 18 1007 18 1007      Row Name 18 1000             Vital Signs    Pre SpO2 (%) 96  -KH      O2 Delivery Pre Treatment room air  -KH      Intra SpO2 (%) 95  -KH      O2 Delivery Intra Treatment room air  -KH      Post SpO2 (%) 95  -KH      O2 Delivery Post Treatment room air  -KH      Recorded by [KH] Prachi Kaur, PT 18 1007 18 1007      Row Name 18 1000             Cognitive Assessment/Intervention- PT/OT    Affect/Mental Status (Cognitive) WFL  -KH      Orientation Status (Cognition) oriented x 4  -KH      Follows Commands (Cognition) WFL  -KH      Cognitive Function (Cognitive) WFL  -KH      Personal Safety Interventions fall prevention program maintained;gait belt;nonskid shoes/slippers when out of bed  -KH      Recorded by [KH] Prachi Kaur, PT 18 1007 18 1007      Row Name 18 1000             Safety Issues,  Functional Mobility    Impairments Affecting Function (Mobility) balance  -KH      Recorded by [KH] Prachi Kaur, PT 04/14/18 1007 04/14/18 1007      Row Name 04/14/18 1000             Bed Mobility Assessment/Treatment    Supine-Sit Luna (Bed Mobility) not tested   up in chair  -KH      Sit-Supine Luna (Bed Mobility) supervision  -KH      Recorded by [KH] Prachi Kaur, PT 04/14/18 1007 04/14/18 1007      Row Name 04/14/18 1000             Transfer Assessment/Treatment    Sit-Stand Luna (Transfers) supervision  -KH      Stand-Sit Luna (Transfers) supervision  -KH      Recorded by [KH] Prachi Kaur, PT 04/14/18 1007 04/14/18 1007      Row Name 04/14/18 1000             Gait/Stairs Assessment/Training    Luna Level (Gait) stand by assist;contact guard  -KH      Assistive Device (Gait) --   none  -KH      Distance in Feet (Gait) 400  -KH      Deviations/Abnormal Patterns (Gait) gait speed decreased;stride length decreased  -KH      Recorded by [KH] Prachi Kaur, PT 04/14/18 1007 04/14/18 1007      Row Name 04/14/18 1000             Motor Skills Assessment/Interventions    Additional Documentation Therapeutic Exercise (Group)  -KH      Recorded by [KH] Prachi Kaur, PT 04/14/18 1007 04/14/18 1007      Row Name 04/14/18 1000             Therapeutic Exercise    Lower Extremity (Therapeutic Exercise) LAQ (long arc quad), bilateral;marching while seated   hip abduction B, ankle pumps B  -KH      Comment (Therapeutic Exercise) x10 reps  -KH      Recorded by [CAROLE] Prachi Kaur, PT 04/14/18 1007 04/14/18 1007      Row Name 04/14/18 1000             Balance    Balance --  -KH      Recorded by [CAROLE] Prachi Kaur, PT 04/14/18 1007 04/14/18 1007      Row Name 04/14/18 1000             Static Standing Balance    Level of [place] (Supported Standing, Static Balance) standby assist  -KH      Recorded by [CAROLE] Prachi Kaur, PT 04/14/18 1007 04/14/18 1007      Row Name  04/14/18 1000             Dynamic Standing Balance    Level of [place], Reaches Outside Midline (Standing, Dynamic Balance) standby assist;contact guard assist  -KH      Recorded by [KH] Prachi Natasha, PT 04/14/18 1007 04/14/18 1007      Row Name 04/14/18 1000             Positioning and Restraints    Pre-Treatment Position sitting in chair/recliner  -KH      Post Treatment Position bed  -KH      In Bed supine;call light within reach;encouraged to call for assist  -KH      Recorded by [CAROLE] Prachi Kaur, PT 04/14/18 1007 04/14/18 1007      Row Name 04/14/18 1000             Pain Assessment    Additional Documentation --   pt denies any pain  -KH      Recorded by [CAROLE] Prachi Kaur, PT 04/14/18 1007 04/14/18 1007      Row Name 04/14/18 1000             Outcome Summary/Treatment Plan (PT)    Daily Summary of Progress (PT) progress toward functional goals as expected  -KH      Anticipated Discharge Disposition (PT) home or self care;home with home health care  -KH      Recorded by [CAROLE] Prachi Kaur, PT 04/14/18 1007 04/14/18 1007        User Key  (r) = Recorded By, (t) = Taken By, (c) = Cosigned By    Initials Name Effective Dates Discipline    CAROLE BellPrachichelle Terrazasrick, PT 04/03/18 -  PT                     Physical Therapy Education     Title: PT OT SLP Therapies (Done)     Topic: Physical Therapy (Done)     Point: Mobility training (Done)    Learning Progress Summary     Learner Status Readiness Method Response Comment Documented by    Patient Done Acceptance E VANDANA,ROB LAM 04/14/18 1007     Done Acceptance E,JIMMY GRANADOS  PV 04/13/18 1435          Point: Home exercise program (Done)    Learning Progress Summary     Learner Status Readiness Method Response Comment Documented by    Patient Done Acceptance E VU,NR  CAROLE 04/14/18 1007     Done Acceptance E,TB JIMMY ROSS  PV 04/13/18 1435          Point: Body mechanics (Done)    Learning Progress Summary     Learner Status Readiness Method Response Comment Documented by     Patient Done Acceptance GEMA CABALLERO DU   04/13/18 1435          Point: Precautions (Done)    Learning Progress Summary     Learner Status Readiness Method Response Comment Documented by    Patient Done Acceptance GEMA CABALLERO DU   04/13/18 1435                      User Key     Initials Effective Dates Name Provider Type Discipline     04/03/18 -  Prachi Kaur, PT Physical Therapist PT     03/07/18 -  Koby Horton, PT Student PT Student PT                    PT Recommendation and Plan  Anticipated Discharge Disposition (PT): home or self care, home with home health care  Therapy Frequency (PT Clinical Impression): daily  Outcome Summary/Treatment Plan (PT)  Daily Summary of Progress (PT): progress toward functional goals as expected  Anticipated Discharge Disposition (PT): home or self care, home with home health care  Outcome Summary: Pt is improving with mobility. Pt did not use an assitive device during gait and  completed seated LE exercises.           Outcome Measures     Row Name 04/14/18 1009 04/13/18 1400          How much help from another person do you currently need...    Turning from your back to your side while in flat bed without using bedrails? 4  - 4  -CH (r) PV (t) CH (c)     Moving from lying on back to sitting on the side of a flat bed without bedrails? 4  - 4  -CH (r) PV (t) CH (c)     Moving to and from a bed to a chair (including a wheelchair)? 4  - 4  -CH (r) PV (t) CH (c)     Standing up from a chair using your arms (e.g., wheelchair, bedside chair)? 4  - 4  -CH (r) PV (t) CH (c)     Climbing 3-5 steps with a railing? 3  - 3  -CH (r) PV (t) CH (c)     To walk in hospital room? 3  - 3  -CH (r) PV (t) CH (c)     AM-PAC 6 Clicks Score 22  - 22  -CH (r) PV (t)        Functional Assessment    Outcome Measure Options AM-PAC 6 Clicks Basic Mobility (PT)  - AM-PAC 6 Clicks Basic Mobility (PT)  -CH (r) PV (t) CH (c)       User Key  (r) = Recorded By, (t) = Taken By, (c) = Cosigned By     Initials Name Provider Type     Angie Sotomayor, PT Physical Therapist    CAROLE Kaur, PT Physical Therapist    CAROLE Horton, PT Student PT Student           Time Calculation:         PT Charges     Row Name 04/14/18 1009             Time Calculation    Start Time 0941  -      Stop Time 0957  -      Time Calculation (min) 16 min  -      PT Received On 04/14/18  -      PT - Next Appointment 04/15/18  -        User Key  (r) = Recorded By, (t) = Taken By, (c) = Cosigned By    Initials Name Provider Type    CAROLE Kaur, PT Physical Therapist          Therapy Charges for Today     Code Description Service Date Service Provider Modifiers Qty    78334340399 HC PT THER PROC EA 15 MIN 4/14/2018 Prachi Kaur, PT GP, KX 1          PT G-Codes  Outcome Measure Options: AM-PAC 6 Clicks Basic Mobility (PT)    Prachi Kaur, PT  4/14/2018

## 2018-04-14 NOTE — SIGNIFICANT NOTE
04/14/18 1138   Rehab Time/Intention   Evaluation Not Performed (Pt does not require skilled OT at this time will sign off. disucssed with paitent and family all feel like he as at his baseline.  please reorder if needed. )   Rehab Treatment   Discipline occupational therapist

## 2018-04-14 NOTE — SIGNIFICANT NOTE
04/14/18 1259   Rehab Treatment   Discipline speech language pathologist   Reason Treatment Not Performed other (see comments)  (RN pt does not need f/u for diet.  Tolerating reg/thins well, no cognitive deficits, NIH 1.  Will discharge from speech at this time.)

## 2018-04-14 NOTE — PROGRESS NOTES
LPC INPATIENT PROGRESS NOTE         [facility] INTENSIVE CARE    2018      PATIENT IDENTIFICATION:  Name: Jalen Richards ADMIT: 2018   : 1940  PCP: No Known Provider    MRN: [id number] LOS: 2 days   AGE/SEX: 77 y.o. male  ROOM: 389/1                     LOS 2    Reason for visit: Follow-up acute stroke      SUBJECTIVE:    Alert and oriented.  No cough, chest pain or shortness of breath.    Objective   OBJECTIVE:    Vital Sign Min/Max for last 24 hours  Temp  Min: 98.2 °F (36.8 °C)  Max: 98.7 °F (37.1 °C)   BP  Min: 120/61  Max: 163/88   Pulse  Min: 65  Max: 95   Resp  Min: 15  Max: 22   SpO2  Min: 90 %  Max: 96 %   No Data Recorded   No Data Recorded                         Body mass index is 23.98 kg/m².    Intake/Output Summary (Last 24 hours) at 18 1224  Last data filed at 18 0815   Gross per 24 hour   Intake             1000 ml   Output              875 ml   Net              125 ml         Exam:  GEN:  No distress, appears stated age  EYES:   PERRL, anicteric sclera  ENT:    External ears/nose normal, OP clear  NECK:  No adenopathy, midline trachea  LUNGS: Normal chest on inspection, palpation and auscultation  CV:  Normal S1S2, without murmur  ABD:  Non tender, non distended, no hepatosplenomegaly, +BS  EXT:  No edema, cyanosis or clubbing    Scheduled meds:    amLODIPine 10 mg Oral Q24H   apixaban 5 mg Oral Q12H   atorvastatin 80 mg Oral Daily   levothyroxine 50 mcg Oral Q AM     IV meds:                         Data Review:    Results from last 7 days  Lab Units 18  1100   SODIUM mmol/L 138   POTASSIUM mmol/L 4.0   CHLORIDE mmol/L 96*   CO2 mmol/L 27.3   BUN mg/dL 18   CREATININE mg/dL 0.97   GLUCOSE mg/dL 122*   CALCIUM mg/dL 9.3         Estimated Creatinine Clearance: 72.6 mL/min (by C-G formula based on SCr of 0.97 mg/dL).    Results from last 7 days  Lab Units 18  0054 18  1100   WBC 10*3/mm3 10.91* 15.37*   HEMOGLOBIN g/dL 12.2* 14.8    PLATELETS 10*3/mm3 240 235       Results from last 7 days  Lab Units 04/12/18  1100   INR  1.10       Results from last 7 days  Lab Units 04/12/18  1100   ALT (SGPT) U/L 18   AST (SGOT) U/L 29                 MRI brain reviewed    2-D echo reviewed: EF 63.6%.  Grade 1 diastolic dysfunction noted.      )Assessment   ASSESSMENT:      Active Hospital Problems (** Indicates Principal Problem)    Diagnosis Date Noted   • 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     Acute CVA: Embolic  Basilar artery occlusion  Carotid artery stenosis  Grade 1 chronic diastolic dysfunction with preserved systolic function  Hypertension  Chronic hypothyroidism  Tobacco use: Encourage cessation  Elevated troponin  Reactive leukocytosis  Hematuria      PLAN:  Discussed with neurology.  Has multiple reasons to be on chronic anticoagulation so SHANNON not felt to be necessary.  Discontinue SHANNON orders.  Continue anticoagulation per neurology recommendations.  Can transfer out of the intensive care unit.  If does well when okay with all plan for discharge home tomorrow.  PT/ST Miquel Gutierrez MD  Pulmonary and Critical Care Medicine  [facility], [facility]  4/14/2018    12:24 PM

## 2018-04-14 NOTE — PROGRESS NOTES
"DOS: 2018  NAME: Jalen Richards   : 1940  PCP: No Known Provider  Chief Complaint   Patient presents with   • Dizziness     episode of difficulty speaking and walking.  sx have improved.     Stroke    Historian: Nursing and Patient.    Subjective: No events overnight.    Patient denies HA, double/blurred vision, dizziness, numbness or loss of sensation or any other new neurological complaints at this time.       Objective:  Vital signs: /83 (BP Location: Right arm, Patient Position: Lying)   Pulse 70   Temp 98.5 °F (36.9 °C)   Resp 16   Ht 180.3 cm (71\")   Wt 78 kg (171 lb 15.3 oz)   SpO2 92%   BMI 23.98 kg/m²       HEENT: Normocephalic, atraumatic   COR: RRR  Resp: Even and unlabored  Extremities: Equal pulses, non distal embolization    Neurological:   MS: AO. Language normal. No neglect. Higher integrative function normal  CN: II-XII normal (questionabl Left superior temporal visual filed cut)   Motor: 5/5, normal tone  Sensory: Intact  Coordination: Normal  NIHSS 0    Laboratory results:  Lab Results   Component Value Date    GLUCOSE 122 (H) 2018    CALCIUM 9.3 2018     2018    K 4.0 2018    CO2 27.3 2018    CL 96 (L) 2018    BUN 18 2018    CREATININE 0.97 2018    EGFRIFNONA 75 2018    BCR 18.6 2018    ANIONGAP 14.7 2018     Lab Results   Component Value Date    WBC 10.91 (H) 2018    HGB 12.2 (L) 2018    HCT 37.3 (L) 2018    MCV 95.2 2018     2018     Lab Results   Component Value Date    CHOL 145 2018     Lab Results   Component Value Date    HDL 47 2018     Lab Results   Component Value Date    LDL 82 2018     Lab Results   Component Value Date    TRIG 79 2018     Lab Results   Component Value Date    TSH 2.720 2018     No results found for: [id number]  Lab Results   Component Value Date    CHOL 145 2018    TRIG 79 2018    HDL 47 " 04/13/2018    LDL 82 04/13/2018     Lab Results   Component Value Date    HGBA1C 5.50 04/13/2018       Review and interpretation of imaging:  CT angiogram head and neck:There is calcification of the aortic arch with an aneurysm medially of the proximal descending aorta, and subclavian arteries are patent and there is a severe stenosis of the origin of the left vertebral artery which is slightly smaller in the right, there is severe bulky plaque left internal carotid artery worse than right with ulceration, intracranially the left vertebral artery appears to have a severe stenosis as it penetrates the dura, the basilar artery is patent proximally but is flush occluded in its terminus, there is calcification of the intracranial internal carotid arteries and bilateral straight communicating arteries filling the basilar terminus and posterior cerebral arteries     CT scan of the brain is a subtle hypodensity in the medial right occipital lobe that could represent acute to subacute stroke, there is an old stroke in the left occipital parietal lobe, is a dense calcification in the left subcortical frontal white matter likely representing dystrophic calcification or a cavernous angioma     TTE   EF 63.6%  No PFO present. No atrial septal defect noted. No mention of hypokinesia. Saline results negative.   LA Volume Index 21    Interpretation Summary     · Left ventricular systolic function is normal. Calculated EF = 63.6%. Estimated EF was in disagreement with the calculated EF. Estimated EF appears to be in the range of 66 - 70%. Normal left ventricular cavity size noted. All left ventricular wall segments contract normally. Left ventricular wall thickness is consistent with mild concentric hypertrophy. Left ventricular diastolic dysfunction is noted (grade I) consistent with impaired relaxation.  · Normal left atrial size noted. Saline test results are negative.          MRI Brain Multiple strokes in multiple arterial  distributions    Smoking Cessation Information Provided.   A1c 5.5 TSH 2.7 LDL 82 B12 507  Ready to quit: No  Counseling given: Yes  Aspirin 325mg  Neurochecks  Check Lipid panel, Hgb A1C (results above)   Lipitor 80mg  Non-pharmacological DVT prophylaxis  TTE (results of above)   CT/CTA/CTP stat (done)  MRI (pending)   EKG Tele  PT/OT/ST  Stroke Education  Blood pressure control to <130/80  Goal LDL <70-recommend high dose statins-             Serum glucose < 140.     Impression: This is a 76 yo gentle who presented to [facility] 04/12 with complaint of disorientation, dizziness, vertiginous, slurred speech which started at 8:30 am; patient presented @ 11:am. NIHSS on arrival was 2 making him not a rtPA candidate. CTA revealed basilar artery occlusion which is thought most likely an embolic possibly artery to artery origin although a cardioembolic source cannot be excluded. TTE unremarkable (results above). MRI Brain revealed multiple strokes in multiple arterial distributions. PT/OT/ST. CCP to assist with discharge planning. Ok to move to 5N, 5S or 5P.     Patient admitted with speech difficulty and left lower extremity weakness.  Patient has advanced vertebrobasilar disease.  Patient has multiple embolic looking strokes in both anterior and posterior circulation. Patient has previous history of cancer .  Risk factors are reasonably controlled.  Patient failed aspirin therapy.  Patient is hypercoagulable.  Will need anticoagulation.  Not sure about the feasibility of SHANNON if we are anticoagulating the patient anyway.  I had lengthy discussion with patient and family and explained the risks, benefits of switching to anticoagulation from anti-platelet therapy. Patient and family wanted to switch to anticoagulation and he is aware of the bleeding risks.  A1c 5.5 TSH 2.7 LDL 82      Plan:  1. Change antiplatelet therapy to eliquis 5 mg bid as per patient choice.  2. At discharge Secondary stroke prophylaxis: Ideal targets for  stroke prevention would be Blood pressure < 130/80; B12 > 500 TSH in normal range and LDL < 70; HbA1c < 6.5 and smoking cessation if applicable.  3.  Not sure about requirement for SHANNON since we are anticoagulating the patient anyway.  4.  Please restart home medications    Follow-up with neurology in 6-8 weeks after discharge.

## 2018-04-15 LAB
BASOPHILS # BLD AUTO: 0.03 10*3/MM3 (ref 0–0.2)
BASOPHILS NFR BLD AUTO: 0.2 % (ref 0–1.5)
DEPRECATED RDW RBC AUTO: 41.7 FL (ref 37–54)
EOSINOPHIL # BLD AUTO: 0.15 10*3/MM3 (ref 0–0.7)
EOSINOPHIL NFR BLD AUTO: 1.1 % (ref 0.3–6.2)
ERYTHROCYTE [DISTWIDTH] IN BLOOD BY AUTOMATED COUNT: 12.2 % (ref 11.5–14.5)
HCT VFR BLD AUTO: 38.4 % (ref 40.4–52.2)
HGB BLD-MCNC: 13 G/DL (ref 13.7–17.6)
IMM GRANULOCYTES # BLD: 0.06 10*3/MM3 (ref 0–0.03)
IMM GRANULOCYTES NFR BLD: 0.5 % (ref 0–0.5)
LYMPHOCYTES # BLD AUTO: 0.58 10*3/MM3 (ref 0.9–4.8)
LYMPHOCYTES NFR BLD AUTO: 4.4 % (ref 19.6–45.3)
MCH RBC QN AUTO: 31.5 PG (ref 27–32.7)
MCHC RBC AUTO-ENTMCNC: 33.9 G/DL (ref 32.6–36.4)
MCV RBC AUTO: 93 FL (ref 79.8–96.2)
MONOCYTES # BLD AUTO: 1.14 10*3/MM3 (ref 0.2–1.2)
MONOCYTES NFR BLD AUTO: 8.7 % (ref 5–12)
NEUTROPHILS # BLD AUTO: 11.17 10*3/MM3 (ref 1.9–8.1)
NEUTROPHILS NFR BLD AUTO: 85.1 % (ref 42.7–76)
PLATELET # BLD AUTO: 233 10*3/MM3 (ref 140–500)
PMV BLD AUTO: 9.3 FL (ref 6–12)
RBC # BLD AUTO: 4.13 10*6/MM3 (ref 4.6–6)
WBC NRBC COR # BLD: 13.13 10*3/MM3 (ref 4.5–10.7)

## 2018-04-15 PROCEDURE — 99232 SBSQ HOSP IP/OBS MODERATE 35: CPT | Performed by: PSYCHIATRY & NEUROLOGY

## 2018-04-15 PROCEDURE — 85025 COMPLETE CBC W/AUTO DIFF WBC: CPT | Performed by: PSYCHIATRY & NEUROLOGY

## 2018-04-15 RX ADMIN — APIXABAN 5 MG: 5 TABLET, FILM COATED ORAL at 08:33

## 2018-04-15 RX ADMIN — LEVOTHYROXINE SODIUM 50 MCG: 50 TABLET ORAL at 05:46

## 2018-04-15 RX ADMIN — AMLODIPINE BESYLATE 10 MG: 10 TABLET ORAL at 08:33

## 2018-04-15 RX ADMIN — ATORVASTATIN CALCIUM 80 MG: 80 TABLET, FILM COATED ORAL at 08:33

## 2018-04-15 RX ADMIN — LISINOPRIL: 20 TABLET ORAL at 08:32

## 2018-04-15 RX ADMIN — APIXABAN 5 MG: 5 TABLET, FILM COATED ORAL at 21:01

## 2018-04-15 NOTE — PROGRESS NOTES
DAILY PROGRESS NOTE    Pt seen, has h/o PC, on WW, has known urgency flomax no help per Yeni, pvr today at bedside 37cc. Pt has been having intermit gross painless hematuria. Has had ? Recent TIA, neurology following. Tentative plan is OP BRENT mulligan for hematuria. Abd soft today. Some discussion of sending pt home tomorrow butm eliquis has been started. Will monitor hematuria and make dispo on dc tomorrow. 20min w pt,chart, and nurses.

## 2018-04-15 NOTE — PROGRESS NOTES
LPC INPATIENT PROGRESS NOTE         [address]    4/15/2018      PATIENT IDENTIFICATION:  Name: Jalen Richards ADMIT: 2018   : 1940  PCP: No Known Provider    MRN: [id number] LOS: 3 days   AGE/SEX: 77 y.o. male  ROOM: [id number]                     LOS 3    Reason for visit: Follow-up acute stroke      SUBJECTIVE:    Alert and oriented.  No cough, chest pain or shortness of breath.Still with some gross hematuria.      Objective   OBJECTIVE:    Vital Sign Min/Max for last 24 hours  Temp  Min: 97.3 °F (36.3 °C)  Max: 98.5 °F (36.9 °C)   BP  Min: 104/56  Max: 154/77   Pulse  Min: 70  Max: 76   Resp  Min: 16  Max: 18   SpO2  Min: 92 %  Max: 97 %   No Data Recorded   No Data Recorded                         Body mass index is 23.98 kg/m².    Intake/Output Summary (Last 24 hours) at 04/15/18 1432  Last data filed at 04/15/18 1413   Gross per 24 hour   Intake              960 ml   Output                0 ml   Net              960 ml         Exam:  GEN:  No distress, appears stated age  EYES:   PERRL, anicteric sclera  ENT:    External ears/nose normal, OP clear  NECK:  No adenopathy, midline trachea  LUNGS: Normal chest on inspection, palpation and auscultation  CV:  Normal S1S2, without murmur  ABD:  Non tender, non distended, no hepatosplenomegaly, +BS  EXT:  No edema, cyanosis or clubbing    Scheduled meds:      amLODIPine 10 mg Oral Q24H   apixaban 5 mg Oral Q12H   atorvastatin 80 mg Oral Daily   levothyroxine 50 mcg Oral Q AM   lisinopril-hydrochlorothiazide (ZESTORTIC) 20-25 mg combo dose  Oral Q24H     IV meds:                         Data Review:    Results from last 7 days  Lab Units 18  1100   SODIUM mmol/L 138   POTASSIUM mmol/L 4.0   CHLORIDE mmol/L 96*   CO2 mmol/L 27.3   BUN mg/dL 18   CREATININE mg/dL 0.97   GLUCOSE mg/dL 122*   CALCIUM mg/dL 9.3         Estimated Creatinine Clearance: 72.6 mL/min (by C-G formula based on SCr of 0.97 mg/dL).    Results from last 7  days  Lab Units 04/15/18  0927 04/13/18  0054 04/12/18  1100   WBC 10*3/mm3 13.13* 10.91* 15.37*   HEMOGLOBIN g/dL 13.0* 12.2* 14.8   PLATELETS 10*3/mm3 233 240 235       Results from last 7 days  Lab Units 04/12/18  1100   INR  1.10       Results from last 7 days  Lab Units 04/12/18  1100   ALT (SGPT) U/L 18   AST (SGOT) U/L 29                 MRI brain reviewed    2-D echo reviewed: EF 63.6%.  Grade 1 diastolic dysfunction noted.      )Assessment   ASSESSMENT:      Active Hospital Problems (** Indicates Principal Problem)    Diagnosis Date Noted   • 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     Acute CVA: Embolic  Basilar artery occlusion  Carotid artery stenosis  Grade 1 chronic diastolic dysfunction with preserved systolic function  Hypertension  Chronic hypothyroidism  Tobacco use: Encourage cessation  Elevated troponin  Reactive leukocytosis  Gross Hematuria      PLAN:  Continue anticoagulation per neurology recommendations.  Gross hematuria so continue to monitor here.  Urology is following.  May require cystoscopy in near future.  H&H stable.    PT/ST Miquel Gutierrez MD  Pulmonary and Critical Care Medicine  [facility], [facility]  4/15/2018    2:32 PM

## 2018-04-15 NOTE — PROGRESS NOTES
[facility]  Patricio Alvarez MD     LOS: 2 days   Patient Care Team:  No Known Provider as PCP - General        Subjective     Interval History:     Patient Complaints: He is voiding some but it's a little difficult.  He seen some blood off and on today.  History taken from: patient chart family    Review of Systems:   All systems were reviewed and were negative except for gross hematuria.  Some difficulty voiding.  No chest pain.  No shortness of air.    Objective     Vital Signs  Temp:  [97.3 °F (36.3 °C)-98.7 °F (37.1 °C)] 98.2 °F (36.8 °C)  Heart Rate:  [65-95] 72  Resp:  [15-18] 16  BP: (120-163)/(61-92) 129/78    Physical Exam:     General Appearance:    Alert, cooperative, in no acute distress   Head:    Normocephalic, without obvious abnormality, atraumatic   Eyes:            Lids and lashes normal, conjunctivae and sclerae normal, no   icterus, no pallor, corneas clear, PERRLA   Ears:    Ears appear intact with no abnormalities noted   Throat:   No oral lesions, no thrush, oral mucosa moist   Neck:   No adenopathy, supple, trachea midline,    Back:     No kyphosis present, no scoliosis present, no skin lesions,      erythema or scars, no tenderness to percussion or                   palpation,   range of motion normal   Lungs:     Clear to auscultation,respirations regular, even and                  unlabored    Heart:    Regular rhythm and normal rate, normal S1 and S2, no            murmur, no gallop, no rub, no click   Chest Wall:    No abnormalities observed   Abdomen:     Normal bowel sounds, no masses, no organomegaly, soft        non-tender, non-distended, no guarding, no rebound                tenderness   Genital/Rectal:     Deferred    Extremities:   Moves all extremities well, no edema, no cyanosis, no             redness       Skin:   No bleeding, bruising or rash       Neurologic:   Cranial nerves 2 - 12 grossly intact, sensation intact,        Results Review:     I reviewed the patient's  new clinical results.    No results found for this or any previous visit (from the past 24 hour(s)).    Medication Review:   Current Facility-Administered Medications:   •  acetaminophen (TYLENOL) tablet 650 mg, 650 mg, Oral, Q4H PRN **OR** acetaminophen (TYLENOL) suppository 650 mg, 650 mg, Rectal, Q4H PRN, Bradford Gordon MD  •  amLODIPine (NORVASC) tablet 10 mg, 10 mg, Oral, Q24H, Curtis Madera MD, 10 mg at 04/14/18 1138  •  apixaban (ELIQUIS) tablet 5 mg, 5 mg, Oral, Q12H, Curtis Madera MD, 5 mg at 04/14/18 2029  •  atorvastatin (LIPITOR) tablet 80 mg, 80 mg, Oral, Daily, Bradford Gordon MD, 80 mg at 04/14/18 0749  •  levothyroxine (SYNTHROID, LEVOTHROID) tablet 50 mcg, 50 mcg, Oral, Q AM, Miquel Gutierrez MD, 50 mcg at 04/14/18 1138  •  lisinopril (PRINIVIL,ZESTRIL) 20 mg, hydrochlorothiazide (HYDRODIURIL) 25 mg for ZESTORTIC 20-25, , Oral, Q24H, Miquel Gutierrez MD  •  ondansetron (ZOFRAN) injection 4 mg, 4 mg, Intravenous, Q6H PRN, Bradford Gordon MD  •  sodium chloride 0.9 % flush 1-10 mL, 1-10 mL, Intravenous, PRN, Bradford Gordon MD  •  sodium chloride 0.9 % flush 10 mL, 10 mL, Intravenous, PRN, Vitor Diaz MD    Assessment/Plan     Active Problems:    Basilar artery occlusion with cerebral infarction      Gross hematuria    Plan for disposition:Will need continued observation.  Probably cystoscopy next week.    Patricio Alvarez MD  04/14/18  10:16 PM      Time: Greater than 33

## 2018-04-15 NOTE — SIGNIFICANT NOTE
04/15/18 0931   Rehab Treatment   Discipline physical therapy assistant   Recommendation   PT - Next Appointment 04/16/18

## 2018-04-15 NOTE — PLAN OF CARE
Problem: Patient Care Overview  Goal: Plan of Care Review  Outcome: Ongoing (interventions implemented as appropriate)   04/15/18 0518   Coping/Psychosocial   Plan of Care Reviewed With patient;spouse   Plan of Care Review   Progress improving   OTHER   Outcome Summary Pt slept through the night, got up to bathroom several times with assist. Gait steady but with stand-by assist. VSS. No complaints. Able to take pills without difficulty. Wife at bedside. Will continue to monitor.        Problem: Stroke (Ischemic) (Adult)  Goal: Signs and Symptoms of Listed Potential Problems Will be Absent, Minimized or Managed (Stroke)  Outcome: Ongoing (interventions implemented as appropriate)

## 2018-04-15 NOTE — PLAN OF CARE
Problem: Patient Care Overview  Goal: Plan of Care Review   04/15/18 1816   Coping/Psychosocial   Plan of Care Reviewed With patient;spouse   Plan of Care Review   Progress improving   OTHER   Outcome Summary Pt. continues with hematuria in his urine. Dr. Alvarez will be here tomorrow to evaluate pt. NIHSS remains a 0.

## 2018-04-16 VITALS
SYSTOLIC BLOOD PRESSURE: 122 MMHG | DIASTOLIC BLOOD PRESSURE: 73 MMHG | WEIGHT: 171.96 LBS | BODY MASS INDEX: 24.07 KG/M2 | OXYGEN SATURATION: 96 % | HEIGHT: 71 IN | TEMPERATURE: 98.4 F | RESPIRATION RATE: 18 BRPM | HEART RATE: 77 BPM

## 2018-04-16 DIAGNOSIS — I63.119 CEREBROVASCULAR ACCIDENT (CVA) DUE TO EMBOLISM OF VERTEBRAL ARTERY, UNSPECIFIED BLOOD VESSEL LATERALITY (HCC): Primary | ICD-10-CM

## 2018-04-16 DIAGNOSIS — I63.49 CEREBRAL INFARCTION DUE TO EMBOLISM OF OTHER CEREBRAL ARTERY (HCC): ICD-10-CM

## 2018-04-16 LAB
ANION GAP SERPL CALCULATED.3IONS-SCNC: 14.8 MMOL/L
BUN BLD-MCNC: 17 MG/DL (ref 8–23)
BUN/CREAT SERPL: 19.5 (ref 7–25)
CALCIUM SPEC-SCNC: 8.9 MG/DL (ref 8.6–10.5)
CHLORIDE SERPL-SCNC: 96 MMOL/L (ref 98–107)
CO2 SERPL-SCNC: 26.2 MMOL/L (ref 22–29)
CREAT BLD-MCNC: 0.87 MG/DL (ref 0.76–1.27)
DEPRECATED RDW RBC AUTO: 41.6 FL (ref 37–54)
ERYTHROCYTE [DISTWIDTH] IN BLOOD BY AUTOMATED COUNT: 12.2 % (ref 11.5–14.5)
GFR SERPL CREATININE-BSD FRML MDRD: 86 ML/MIN/1.73
GLUCOSE BLD-MCNC: 92 MG/DL (ref 65–99)
HCT VFR BLD AUTO: 37.4 % (ref 40.4–52.2)
HGB BLD-MCNC: 12 G/DL (ref 13.7–17.6)
MCH RBC QN AUTO: 30.4 PG (ref 27–32.7)
MCHC RBC AUTO-ENTMCNC: 32.1 G/DL (ref 32.6–36.4)
MCV RBC AUTO: 94.7 FL (ref 79.8–96.2)
PLATELET # BLD AUTO: 208 10*3/MM3 (ref 140–500)
PMV BLD AUTO: 9.6 FL (ref 6–12)
POTASSIUM BLD-SCNC: 3.6 MMOL/L (ref 3.5–5.2)
RBC # BLD AUTO: 3.95 10*6/MM3 (ref 4.6–6)
SODIUM BLD-SCNC: 137 MMOL/L (ref 136–145)
WBC NRBC COR # BLD: 12.3 10*3/MM3 (ref 4.5–10.7)

## 2018-04-16 PROCEDURE — 80048 BASIC METABOLIC PNL TOTAL CA: CPT | Performed by: INTERNAL MEDICINE

## 2018-04-16 PROCEDURE — 97110 THERAPEUTIC EXERCISES: CPT

## 2018-04-16 PROCEDURE — 85027 COMPLETE CBC AUTOMATED: CPT | Performed by: INTERNAL MEDICINE

## 2018-04-16 RX ORDER — FINASTERIDE 5 MG/1
5 TABLET, FILM COATED ORAL DAILY
Status: DISCONTINUED | OUTPATIENT
Start: 2018-04-16 | End: 2018-04-16 | Stop reason: HOSPADM

## 2018-04-16 RX ORDER — AMLODIPINE BESYLATE 10 MG/1
10 TABLET ORAL
Qty: 30 TABLET | Refills: 0 | Status: ON HOLD | OUTPATIENT
Start: 2018-04-16 | End: 2018-05-01

## 2018-04-16 RX ORDER — FINASTERIDE 5 MG/1
5 TABLET, FILM COATED ORAL DAILY
Qty: 30 TABLET | Refills: 0 | Status: SHIPPED | OUTPATIENT
Start: 2018-04-16

## 2018-04-16 RX ADMIN — LEVOTHYROXINE SODIUM 50 MCG: 50 TABLET ORAL at 10:16

## 2018-04-16 RX ADMIN — LISINOPRIL: 20 TABLET ORAL at 10:15

## 2018-04-16 RX ADMIN — AMLODIPINE BESYLATE 10 MG: 10 TABLET ORAL at 10:16

## 2018-04-16 RX ADMIN — FINASTERIDE 5 MG: 5 TABLET, FILM COATED ORAL at 10:16

## 2018-04-16 RX ADMIN — APIXABAN 5 MG: 5 TABLET, FILM COATED ORAL at 10:16

## 2018-04-16 RX ADMIN — ATORVASTATIN CALCIUM 80 MG: 80 TABLET, FILM COATED ORAL at 10:17

## 2018-04-16 NOTE — PROGRESS NOTES
Case Management Discharge Note    Final Note: Home w/ family support    Destination     No service coordination in this encounter.      Durable Medical Equipment     No service coordination in this encounter.      Dialysis/Infusion     No service coordination in this encounter.      Home Medical Care     No service coordination in this encounter.      Social Care     No service coordination in this encounter.        Other: Other    Final Discharge Disposition Code: 01 - home or self-care

## 2018-04-16 NOTE — PLAN OF CARE
Problem: Patient Care Overview  Goal: Plan of Care Review  Outcome: Ongoing (interventions implemented as appropriate)   04/16/18 4884   Coping/Psychosocial   Plan of Care Reviewed With patient   Plan of Care Review   Progress improving   OTHER   Outcome Summary Pt continues with hematuria in his urine. NIHSS remains 0. Pt up to bathroom with stand by assist throughout the night. Pt remains alert and oriented, clear, logical speech. No s/s of slurred speech or confusion. Pt is to have SHANNON . Pt has been NPO after midnight. Will continue to monitor.       Problem: Fall Risk (Adult)  Goal: Identify Related Risk Factors and Signs and Symptoms  Outcome: Ongoing (interventions implemented as appropriate)    Goal: Absence of Fall  Outcome: Ongoing (interventions implemented as appropriate)      Problem: Stroke (Ischemic) (Adult)  Goal: Signs and Symptoms of Listed Potential Problems Will be Absent, Minimized or Managed (Stroke)  Outcome: Ongoing (interventions implemented as appropriate)

## 2018-04-16 NOTE — NURSING NOTE
Called [facility] for clearance of discharge. She states that the patient is cleared for discharge and to have follow up in 30 days. If patient is to have cystoscopy the patient is to call the neurology office for clearance.

## 2018-04-16 NOTE — NURSING NOTE
Called Dr. Callahan in regards to when follow up is needed and if cystoscopy is needed. Dr Callahan states office will call to make appointment and will then discuss the next steps for the patient. Until then continue to take the eliquis. Patient is cleared for discharge from his standpoint.

## 2018-04-16 NOTE — DISCHARGE SUMMARY
PHYSICIAN DISCHARGE SUMMARY                                                                        Psychiatric    Date of admit: 4/12/2018  Date of Discharge:  4/16/2018    PCP: No Known Provider    Discharge Diagnosis:   Active Problems:    Basilar artery occlusion with cerebral infarction  Acute CVA: Embolic  Basilar artery occlusion  Carotid artery stenosis  Grade 1 chronic diastolic dysfunction with preserved systolic function  Hypertension  Chronic hypothyroidism  Tobacco use: Encourage cessation  Elevated troponin  Reactive leukocytosis  Gross Hematuria       Secondary Diagnoses:  Past Medical History:   Diagnosis Date   • Cancer     3-4 years ago lymph node radiation    • Disease of thyroid gland    • Hyperlipidemia    • Hypertension    • Prostate cancer 2015    no treatment. observation by Dr Yarbrough   • Prostatic hypertrophy     followed by Dr Yarbrough       Procedures Performed           Consults:       Hospital Course  Patient is a 77 y.o. male presented with per H&P:    CC: difficultly talking and weakness     HPI: Mr. Richards is a 78yo WM with a history of tobacco abuse.  He reports sudden onset of severe aphasia and some mild left lower extremity weakness. No Particular aggravating or alleviating factors, but it resolved spontaneously.  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    Admitted by Dr Santana with:  1. Acute CVA -- care as per protocol; DR. Lopez following.   2. Basilar artery occlusion  3. Carotid artery stenosis  4. Hypertension -  5. Hypothyroidism -- continue replacement  6. Tobacco abuse -- discussed cessation  7. Elevated troponin -- will have cards to see  8. Leukocytosis -- suspect reactive.   9. Hematuria -- will have urology to see    Was seen by neurology, cardiology and  urology.   Noted to have acute embolic cva.  On anticoagulation for his cva per neurology recs and developed gross hematuria.  This has been improving and he has been cleared for d/c home by urology and will f/u near future for further eval as out pt.  Has been cleared by all for dc home.  He will f/u with neurology as out pt as well in several weeks.      Condition on Discharge:  Stable.      Vital Signs  Temp:  [98.4 °F (36.9 °C)-98.6 °F (37 °C)] 98.4 °F (36.9 °C)  Heart Rate:  [69-77] 77  Resp:  [16-18] 18  BP: (114-130)/(58-73) 122/73    Physical Exam:  General Appearance: no acute distress, appears comfortable  Heart: regular rate and rhythm  Lungs: clear to auscultation bilaterally, respirations unlabored  Abdomen: soft, non-tender, non-distended, no guarding/rebound, nl BS  Extremeties: no edema, no cyanosis  Neurology: speech normal, mental status normal, moving all four extremeties  Mental Status: alert, oriented        --  Consults:   IP CONSULT TO NEUROLOGY  IP CONSULT TO NEUROLOGY  IP CONSULT TO NEURO CLINICAL SPECIALIST  IP CONSULT TO REHAB ADMISSION  IP CONSULT TO CASE MANAGEMENT   IP CONSULT TO UROLOGY  IP CONSULT TO CARDIOLOGY  IP CONSULT TO CARDIOLOGY    Significant Discharge Diagnostics   Procedures Performed:         Pertinent Lab Results:    Results from last 7 days  Lab Units 04/16/18  0430 04/12/18  1100   SODIUM mmol/L 137 138   POTASSIUM mmol/L 3.6 4.0   CHLORIDE mmol/L 96* 96*   CO2 mmol/L 26.2 27.3   BUN mg/dL 17 18   CREATININE mg/dL 0.87 0.97   GLUCOSE mg/dL 92 122*   CALCIUM mg/dL 8.9 9.3   AST (SGOT) U/L  --  29   ALT (SGPT) U/L  --  18       Results from last 7 days  Lab Units 04/13/18  0054 04/12/18  1730 04/12/18  1100   CK TOTAL U/L 127  --   --    TROPONIN T ng/mL 0.440* 0.412* 0.476*       Results from last 7 days  Lab Units 04/16/18  0430 04/15/18  0927 04/13/18  0054 04/12/18  1100   WBC 10*3/mm3 12.30* 13.13* 10.91* 15.37*   HEMOGLOBIN g/dL 12.0* 13.0* 12.2* 14.8    HEMATOCRIT % 37.4* 38.4* 37.3* 44.4   PLATELETS 10*3/mm3 208 233 240 235   MCV fL 94.7 93.0 95.2 96.1   MCH pg 30.4 31.5 31.1 32.0   MCHC g/dL 32.1* 33.9 32.7 33.3   RDW % 12.2 12.2 12.4 12.2   RDW-SD fl 41.6 41.7 42.9 42.7   MPV fL 9.6 9.3 9.2 9.5   NEUTROPHIL % %  --  85.1*  --  87.4*   LYMPHOCYTE % %  --  4.4*  --  4.4*   MONOCYTES % %  --  8.7  --  7.0   EOSINOPHIL % %  --  1.1  --  0.7   BASOPHIL % %  --  0.2  --  0.1   IMM GRAN % %  --  0.5  --  0.4   NEUTROS ABS 10*3/mm3  --  11.17*  --  13.43*   LYMPHS ABS 10*3/mm3  --  0.58*  --  0.68*   MONOS ABS 10*3/mm3  --  1.14  --  1.08   EOS ABS 10*3/mm3  --  0.15  --  0.10   BASOS ABS 10*3/mm3  --  0.03  --  0.02   IMMATURE GRANS (ABS) 10*3/mm3  --  0.06*  --  0.06*       Results from last 7 days  Lab Units 04/12/18  1100   INR  1.10   APTT seconds 31.7           Results from last 7 days  Lab Units 04/13/18  0054   CHOLESTEROL mg/dL 145   TRIGLYCERIDES mg/dL 79   HDL CHOL mg/dL 47           Results from last 7 days  Lab Units 04/13/18  0054   TSH mIU/mL 2.720                       Results from last 7 days  Lab Units 04/12/18  1218   URINECX  No growth       Results from last 7 days  Lab Units 04/12/18  1218   NITRITE UA  Positive*   WBC UA /HPF Unable to determine due to loaded field*   BACTERIA UA /HPF Unable to determine due to loaded field*   SQUAM EPITHEL UA /HPF Unable to determine due to loaded field*   URINECX  No growth               Imaging Results:  Imaging Results (all)     Procedure Component Value Units Date/Time    MRI Brain With & Without Contrast [[id number]] Collected:  04/13/18 1240     Updated:  04/13/18 1323    Narrative:       MRI OF THE BRAIN WITH AND WITHOUT CONTRAST     CLINICAL HISTORY: Difficulty speaking, unsteady gait, and left-sided  weakness.     TECHNIQUE: MRI of the brain was obtained with sagittal T1, axial pre and  postgadolinium T1, coronal postgadolinium T1, axial FLAIR, axial T2,  axial diffusion, and axial gradient echo  images.     FINDINGS:     There are foci of acute infarction identified within the brain  parenchyma. These are seen within the medial aspect of the right  occipital lobe within the right PCA distribution, left cerebellar  hemisphere within the left PICA and left superior cerebellar artery  distribution, the medial portion of the left occipital lobe within the  left PCA distribution, and the more lateral aspect of the left occipital  lobe which may be within either the left MCA or the left PCA  distribution. Additionally, there are foci of acute and subacute  infarction appreciated within the lateral aspect of the left frontal and  parietal lobes within the left MCA distribution and right superior  frontal gyrus within the right DONN distribution. The foci of infarction  involving the medial portion of the right occipital lobe measure up to  approximately 4.5 x 1.6 cm in greatest axial dimensions. The largest of  the infarcts within the left PICA distribution measures up to  approximately 8 mm in diameter. The left superior cerebellar artery  distribution infarct measures up to 6 mm in diameter. The largest of the  left lateral occipital infarcts measures up to approximately 6 mm in  diameter. Multiple of these foci of acute infarction are identified  within the lateral aspect of the left occipital lobe. Within the medial  portion of the left occipital lobe in the left PCA distribution, the  infarct measures up to 6 mm in diameter. The foci of acute infarction  within the lateral aspect of the left frontal and parietal lobes as well  as the right superior frontal gyrus are all relatively punctate and  subcentimeter in size. There is no evidence for hemorrhagic  transformation of these infarcts.     The postcontrast images demonstrate contrast enhancement within the  infarcts within the medial portion of the right occipital lobe as well  as the infarct within the right paracentral gyrus.     Additionally, there are  foci of chronic infarction within the brain  parenchyma. Within the left post central gyrus, there is a 5 mm chronic  infarct. Within the left superior parietal lobule, another 5 mm chronic  infarct is identified. A chronic infarct within the left occipital lobe  measures up to approximately 8 mm in diameter. Small chronic infarcts  are additionally appreciated within the left cerebellar hemisphere.     The major intracranial flow-voids are remarkable for an abnormal flow  void within the superior basilar artery. This portion of the basilar  artery was seen to be occluded on yesterday's CT angiogram.     There is a punctate area of hypointense signal within the left centrum  semiovale on the gradient echo sequence measuring up to 2 to 3 mm in  diameter. There may be a small area of hemosiderin deposition at this  site.     There are mild to moderate changes of chronic small vessel ischemic  phenomena. There are no abnormal areas of contrast enhancement. The  midline intracranial anatomy is within normal limits. The ventricles,  sulci, and cisterns are age appropriate.       Impression:          There are multiple foci of acute to subacute infarction within the brain  parenchyma. These are noted within multiple vascular distributions.  Given the multiple vascular distributions, a central embolic source such  as the heart, the aortic arch, or the blood stream is the most likely  possibility. These supratentorial and infratentorial infarcts have been  discussed in detail above.     Additionally, there are small subcentimeter foci of chronic infarction  identified within the left postcentral gyrus and the left superior  parietal lobule within the left MCA distribution. Infratentorially,  subcentimeter chronic infarcts are identified within the left cerebellar  hemisphere.     There is an abnormal flow void within the superior basilar artery. This  vessel was seen to be occluded on yesterday's CT angiogram.     There is  a punctate area of hypointense signal within the left centrum  semiovale on the gradient echo sequence measuring up to 2 to 3 mm in  diameter. There may be a small area of hemosiderin deposition at this  site.     These findings were directly discussed with Dr. Gordon on 04/13/2018  at approximately 12:00 PM.     This report was finalized on 4/13/2018 1:20 PM by Dr. Bharathi Powers MD.       CT Angiogram Neck With & Without Contrast [[id number]] Collected:  04/12/18 1523     Updated:  04/12/18 1727    Narrative:       CONTRAST ENHANCED CT ANGIOGRAM OF HEAD AND NECK, CT PERFUSION STUDY THE  HEAD 04/12/2018     CLINICAL HISTORY: Team D. Patient has unsteady gait, speech difficulty  with some aphasia and left-sided weakness.     TECHNIQUE: Initially, spiral CT images were obtained from the base of  the skull to the vertex without intravenous contrast and images were  reformatted and submitted in 3 mm thick axial CT sections with brain  algorithm. There is evidence of a 2.1 x 1.4 cm infarct in the medial  right occipital lobe and right posterior cerebral artery territory,  precise age uncertain but suspect that is likely chronic or possibly  late subacute and there is an additional 1.7 x 1.2 cm old medial left  occipital infarct in the left PCA territory and there is a coarse  calcification in the superior left frontal white matter measuring 6 mm  in size that is nonspecific but could be from an underlying cavernoma or  benign coarse parenchymal calcification. No additional abnormality is  seen. The results of this study were communicated to Dr. Gordon by  telephone 04/12/2018 at 11:05 AM and he requested that the patient have  a CT angiogram of the head and neck and CT perfusion study of the head  and subsequently spiral CT images were obtained through the head during  the arterial phase of contrast and images were analyzed with rapid  software analysis for a CT perfusion study and subsequently spiral CT  images  were obtained from the top of the aortic arch up through the  great vessels of the head and neck during the arterial phase of contrast  and images were reformatted and submitted in 1 mm thick axial CT  section, 1 mm thick sagittal and coronal reconstructions and 3D  reconstructions were performed to complete the CT angiogram of the head  and neck.     COMPARISON: This study is correlated to a prior CT scan of the neck and  chest from [facility] 01/22/2015. There are no prior head  CTs or MRIs of the head for comparison.     FINDINGS:  HEAD CT: There is some patchy low-density extending from the  periventricular into the subcortical white matter of the cerebral  hemispheres consistent with mild-to-moderate small vessel disease. There  is a discrete 1.7 x 1.2 cm area of encephalomalacia in the posterior  medial left occipital lobe compatible with an old posterior medial left  occipital infarct in the left posterior cerebral artery territory and  there is a 2.1 x 1.4 x 2 cm area of low-density through the gray-white  matter of the posterior medial right occipital lobe and some 15 x 10 mm  area in the more anterior right occipital lobe compatible with infarct,  size uncertain but I suspect that they are subacute or chronic infarcts  in the right posterior cerebral artery territory. There is a 6 mm  irregular coarse dystrophic parenchymal calcification in the superior  left frontal white matter that is nonspecific. The ventricles are normal  in size. I see no mass effect and no midline shift. No extra-axial fluid  collections are identified and there is no evidence of acute  intracranial hemorrhage. No abnormal areas of enhancement are seen in  the head. The paranasal sinuses, mastoid air cells and middle ear  cavities are clear.     CT PERFUSION OF THE HEAD: The bolus of contrast was blown as IV  extravasated during the CT perfusion exam and therefore it is  nondiagnostic.     CT ANGIOGRAM OF THE HEAD AND  NECK: The nasopharynx is within normal  limits. There is collapse of the right oropharyngeal airway. There is  some mild submucosal edema and thickening in the anterior and right  lateral oropharynx that may be post radiation change. The true cords and  subglottic airway are normal in appearance. The thyroid gland enhances  homogeneously and is normal in appearance. The lung apices demonstrate  some biapical emphysematous change but otherwise the lung apices are  clear. 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 On prior chest CT, it measured 3.5 x 2.3 cm in medial  lateral and anterior posterior dimensions. It is incompletely  characterized on this exam. 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 There are  calcified plaques in the aortic arch. There is anatomic origin of the  great vessels off the aortic arch. The left subclavian artery origin is  within normal limits, no stenosis seen left subclavian artery. There is  a focal mild to moderate stenosis at the left vertebral artery origin  but beyond its origin the left vertebral artery appears to be widely  patent. It gives off the left PICA as it pierces the dura and extends  intracranially. Post PICA segment is somewhat hypoplastic but patent to  the vertebrobasilar junction. The left common carotid origin is normal  in comparison. There is circumferential mixed calcified and noncalcified  plaque circumscribing the left common carotid bifurcation. The plaque  extends into the origin and proximal aspect of the left internal carotid  artery. There are multiple deep ulcerations in the anterior wall of a  noncalcified component to the plaque. Plaque only results in a mild,  less than 20% stenosis proximal left internal carotid artery using the  NASCET criteria. The brachiocephalic artery origin is normal in  appearance, no stenosis is seen in the brachiocephalic artery  and its  bifurcation into the right subclavian common carotid artery is normal in  appearance, no stenosis is seen in the right subclavian artery. The  right vertebral artery origin is normal in appearance and the right  vertebral artery is normal in caliber from its origin to the  vertebrobasilar junction without stenosis. The right common carotid  origin is normal in appearance. No stenosis is seen in the right common  carotid artery. There is mixed calcified and noncalcified plaque  extending from the right common carotid bifurcation into the origin of  the right internal carotid artery but there is no stenosis in the right  internal carotid artery using the NASCET criteria.     CT angiogram images through the head demonstrate a filling defect in the  distal basilar artery compatible with a thrombus or embolus. It measures  about 4.5 mm in length and 3 mm in transverse diameter. It is proximal  to the superior cerebellar artery origin. There is a well-developed  posterior communicating artery bilaterally off the posterior walls of  the supracavernous internal carotid arteries that then fill the P1  segments and retrograde fill the basilar tip in the superior cerebellar  arteries as well as supply blood flow to the P2 segments of the  posterior cerebral arteries. There does appear to be a cut-off of distal  P3 segment of the right posterior cerebral artery, suspicious for an  occlusion by an embolus or thrombus. The upper cervical, petrous,  cavernous and supracavernous segments of the internal carotid arteries  are normal in appearance. The anterior and middle cerebral arteries are  normal in appearance.       Impression:       1. There is mild to moderate small vessel disease in the cerebral white  matter. There is a 1.7 x 1.2 cm area of encephalomalacia consistent with  an old infarct in the posterior medial left occipital lobe within the  left posterior cerebral artery territory and there is a 2.1 x 1.4 x 2  cm  area of low-density through the gray-white matter of the posterior  medial right occipital lobe consistent with an infarct and the precise  age of this infarct is uncertain. It could be anywhere from acute to  chronic and there is a 1.5 cm old infarct in the anterior medial right  occipital lobe in the right posterior cerebral artery territory. There  is a mild to moderate stenosis at the left vertebral origin and mild  stenosis of the left vertebral artery as it pierces the dura and extends  intracranially and the post PICA segment of the distal left vertebral  artery is small but patent and the dominant right vertebral artery is  widely patent from its origin at the vertebrobasilar junction. In the  distal basilar artery extending to just proximal to the superior  cerebellar artery origins, there is abrupt cut-off of the contrast  column by a 3-4 mm long clot or embolus to the distal basilar artery.  There is reconstitution of the basilar tip via retrograde flow by  bilateral posterior communicating arteries that refill the basilar tip  and also fill the superior cerebellar arteries and the P1, P2 and P3  branches of the posterior cerebral arteries bilaterally, however there  is abrupt occlusion of the distal P3 segment right posterior cerebral  artery likely by an embolus. Infarcts in the occipital lobe could be  better dated with an MRI of the head which I recommend for further  evaluation.  2. 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 It is only partially visualized on this  exam. Calcified aneurysmal on prior CT measured 3.5 x 2.3 cm in  transverse dimension and could be recharacterized with a repeat chest  CT. There are emphysematous changes in the lung apices.  3. There is a mixed calcified and noncalcified deeply ulcerated plaque  that involves the left common carotid artery  origin and proximal left  internal carotid artery that results in a 20% stenosis proximal left  internal carotid artery using the NASCET criteria.   4. This patient has a prior history of squamous cell carcinoma of the  neck and there is some collapse of the right side of the oropharynx  which limits evaluation for residual mucosal lesion and I recommend  correlation with direct visualization of the pharynx. The remainder of  the CT angiogram of the head and neck is within normal limits.       The results were reviewed with Bradford Gordon in person on 04/12/2018  at 11:40 AM.     Radiation dose reduction techniques were utilized, including automated  exposure control and exposure modulation based on body size.     This report was finalized on 4/12/2018 5:24 PM by Dr. Davis Castillo MD.       CT Angiogram Head With & Without Contrast [[id number]] Collected:  04/12/18 1523     Updated:  04/12/18 1727    Narrative:       CONTRAST ENHANCED CT ANGIOGRAM OF HEAD AND NECK, CT PERFUSION STUDY THE  HEAD 04/12/2018     CLINICAL HISTORY: Team D. Patient has unsteady gait, speech difficulty  with some aphasia and left-sided weakness.     TECHNIQUE: Initially, spiral CT images were obtained from the base of  the skull to the vertex without intravenous contrast and images were  reformatted and submitted in 3 mm thick axial CT sections with brain  algorithm. There is evidence of a 2.1 x 1.4 cm infarct in the medial  right occipital lobe and right posterior cerebral artery territory,  precise age uncertain but suspect that is likely chronic or possibly  late subacute and there is an additional 1.7 x 1.2 cm old medial left  occipital infarct in the left PCA territory and there is a coarse  calcification in the superior left frontal white matter measuring 6 mm  in size that is nonspecific but could be from an underlying cavernoma or  benign coarse parenchymal calcification. No additional abnormality is  seen. The results of this study  were communicated to Dr. Gordon by  telephone 04/12/2018 at 11:05 AM and he requested that the patient have  a CT angiogram of the head and neck and CT perfusion study of the head  and subsequently spiral CT images were obtained through the head during  the arterial phase of contrast and images were analyzed with rapid  software analysis for a CT perfusion study and subsequently spiral CT  images were obtained from the top of the aortic arch up through the  great vessels of the head and neck during the arterial phase of contrast  and images were reformatted and submitted in 1 mm thick axial CT  section, 1 mm thick sagittal and coronal reconstructions and 3D  reconstructions were performed to complete the CT angiogram of the head  and neck.     COMPARISON: This study is correlated to a prior CT scan of the neck and  chest from [facility] 01/22/2015. There are no prior head  CTs or MRIs of the head for comparison.     FINDINGS:  HEAD CT: There is some patchy low-density extending from the  periventricular into the subcortical white matter of the cerebral  hemispheres consistent with mild-to-moderate small vessel disease. There  is a discrete 1.7 x 1.2 cm area of encephalomalacia in the posterior  medial left occipital lobe compatible with an old posterior medial left  occipital infarct in the left posterior cerebral artery territory and  there is a 2.1 x 1.4 x 2 cm area of low-density through the gray-white  matter of the posterior medial right occipital lobe and some 15 x 10 mm  area in the more anterior right occipital lobe compatible with infarct,  size uncertain but I suspect that they are subacute or chronic infarcts  in the right posterior cerebral artery territory. There is a 6 mm  irregular coarse dystrophic parenchymal calcification in the superior  left frontal white matter that is nonspecific. The ventricles are normal  in size. I see no mass effect and no midline shift. No extra-axial  fluid  collections are identified and there is no evidence of acute  intracranial hemorrhage. No abnormal areas of enhancement are seen in  the head. The paranasal sinuses, mastoid air cells and middle ear  cavities are clear.     CT PERFUSION OF THE HEAD: The bolus of contrast was blown as IV  extravasated during the CT perfusion exam and therefore it is  nondiagnostic.     CT ANGIOGRAM OF THE HEAD AND NECK: The nasopharynx is within normal  limits. There is collapse of the right oropharyngeal airway. There is  some mild submucosal edema and thickening in the anterior and right  lateral oropharynx that may be post radiation change. The true cords and  subglottic airway are normal in appearance. The thyroid gland enhances  homogeneously and is normal in appearance. The lung apices demonstrate  some biapical emphysematous change but otherwise the lung apices are  clear. 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 On prior chest CT, it measured 3.5 x 2.3 cm in medial  lateral and anterior posterior dimensions. It is incompletely  characterized on this exam. 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 There are  calcified plaques in the aortic arch. There is anatomic origin of the  great vessels off the aortic arch. The left subclavian artery origin is  within normal limits, no stenosis seen left subclavian artery. There is  a focal mild to moderate stenosis at the left vertebral artery origin  but beyond its origin the left vertebral artery appears to be widely  patent. It gives off the left PICA as it pierces the dura and extends  intracranially. Post PICA segment is somewhat hypoplastic but patent to  the vertebrobasilar junction. The left common carotid origin is normal  in comparison. There is circumferential mixed calcified and noncalcified  plaque circumscribing the left common carotid bifurcation. The  plaque  extends into the origin and proximal aspect of the left internal carotid  artery. There are multiple deep ulcerations in the anterior wall of a  noncalcified component to the plaque. Plaque only results in a mild,  less than 20% stenosis proximal left internal carotid artery using the  NASCET criteria. The brachiocephalic artery origin is normal in  appearance, no stenosis is seen in the brachiocephalic artery and its  bifurcation into the right subclavian common carotid artery is normal in  appearance, no stenosis is seen in the right subclavian artery. The  right vertebral artery origin is normal in appearance and the right  vertebral artery is normal in caliber from its origin to the  vertebrobasilar junction without stenosis. The right common carotid  origin is normal in appearance. No stenosis is seen in the right common  carotid artery. There is mixed calcified and noncalcified plaque  extending from the right common carotid bifurcation into the origin of  the right internal carotid artery but there is no stenosis in the right  internal carotid artery using the NASCET criteria.     CT angiogram images through the head demonstrate a filling defect in the  distal basilar artery compatible with a thrombus or embolus. It measures  about 4.5 mm in length and 3 mm in transverse diameter. It is proximal  to the superior cerebellar artery origin. There is a well-developed  posterior communicating artery bilaterally off the posterior walls of  the supracavernous internal carotid arteries that then fill the P1  segments and retrograde fill the basilar tip in the superior cerebellar  arteries as well as supply blood flow to the P2 segments of the  posterior cerebral arteries. There does appear to be a cut-off of distal  P3 segment of the right posterior cerebral artery, suspicious for an  occlusion by an embolus or thrombus. The upper cervical, petrous,  cavernous and supracavernous segments of the internal  carotid arteries  are normal in appearance. The anterior and middle cerebral arteries are  normal in appearance.       Impression:       1. There is mild to moderate small vessel disease in the cerebral white  matter. There is a 1.7 x 1.2 cm area of encephalomalacia consistent with  an old infarct in the posterior medial left occipital lobe within the  left posterior cerebral artery territory and there is a 2.1 x 1.4 x 2 cm  area of low-density through the gray-white matter of the posterior  medial right occipital lobe consistent with an infarct and the precise  age of this infarct is uncertain. It could be anywhere from acute to  chronic and there is a 1.5 cm old infarct in the anterior medial right  occipital lobe in the right posterior cerebral artery territory. There  is a mild to moderate stenosis at the left vertebral origin and mild  stenosis of the left vertebral artery as it pierces the dura and extends  intracranially and the post PICA segment of the distal left vertebral  artery is small but patent and the dominant right vertebral artery is  widely patent from its origin at the vertebrobasilar junction. In the  distal basilar artery extending to just proximal to the superior  cerebellar artery origins, there is abrupt cut-off of the contrast  column by a 3-4 mm long clot or embolus to the distal basilar artery.  There is reconstitution of the basilar tip via retrograde flow by  bilateral posterior communicating arteries that refill the basilar tip  and also fill the superior cerebellar arteries and the P1, P2 and P3  branches of the posterior cerebral arteries bilaterally, however there  is abrupt occlusion of the distal P3 segment right posterior cerebral  artery likely by an embolus. Infarcts in the occipital lobe could be  better dated with an MRI of the head which I recommend for further  evaluation.  2. 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 It is only partially visualized on this  exam. Calcified aneurysmal on prior CT measured 3.5 x 2.3 cm in  transverse dimension and could be recharacterized with a repeat chest  CT. There are emphysematous changes in the lung apices.  3. There is a mixed calcified and noncalcified deeply ulcerated plaque  that involves the left common carotid artery origin and proximal left  internal carotid artery that results in a 20% stenosis proximal left  internal carotid artery using the NASCET criteria.   4. This patient has a prior history of squamous cell carcinoma of the  neck and there is some collapse of the right side of the oropharynx  which limits evaluation for residual mucosal lesion and I recommend  correlation with direct visualization of the pharynx. The remainder of  the CT angiogram of the head and neck is within normal limits.       The results were reviewed with Bradford Gordon in person on 04/12/2018  at 11:40 AM.     Radiation dose reduction techniques were utilized, including automated  exposure control and exposure modulation based on body size.     This report was finalized on 4/12/2018 5:24 PM by Dr. Davis Castillo MD.       CT Cerebral Perfusion With & Without Contrast [[id number]] Collected:  04/12/18 1523     Updated:  04/12/18 1727    Narrative:       CONTRAST ENHANCED CT ANGIOGRAM OF HEAD AND NECK, CT PERFUSION STUDY THE  HEAD 04/12/2018     CLINICAL HISTORY: Team D. Patient has unsteady gait, speech difficulty  with some aphasia and left-sided weakness.     TECHNIQUE: Initially, spiral CT images were obtained from the base of  the skull to the vertex without intravenous contrast and images were  reformatted and submitted in 3 mm thick axial CT sections with brain  algorithm. There is evidence of a 2.1 x 1.4 cm infarct in the medial  right occipital lobe and right posterior cerebral artery territory,  precise age uncertain but  suspect that is likely chronic or possibly  late subacute and there is an additional 1.7 x 1.2 cm old medial left  occipital infarct in the left PCA territory and there is a coarse  calcification in the superior left frontal white matter measuring 6 mm  in size that is nonspecific but could be from an underlying cavernoma or  benign coarse parenchymal calcification. No additional abnormality is  seen. The results of this study were communicated to Dr. Gordon by  telephone 04/12/2018 at 11:05 AM and he requested that the patient have  a CT angiogram of the head and neck and CT perfusion study of the head  and subsequently spiral CT images were obtained through the head during  the arterial phase of contrast and images were analyzed with rapid  software analysis for a CT perfusion study and subsequently spiral CT  images were obtained from the top of the aortic arch up through the  great vessels of the head and neck during the arterial phase of contrast  and images were reformatted and submitted in 1 mm thick axial CT  section, 1 mm thick sagittal and coronal reconstructions and 3D  reconstructions were performed to complete the CT angiogram of the head  and neck.     COMPARISON: This study is correlated to a prior CT scan of the neck and  chest from [facility] 01/22/2015. There are no prior head  CTs or MRIs of the head for comparison.     FINDINGS:  HEAD CT: There is some patchy low-density extending from the  periventricular into the subcortical white matter of the cerebral  hemispheres consistent with mild-to-moderate small vessel disease. There  is a discrete 1.7 x 1.2 cm area of encephalomalacia in the posterior  medial left occipital lobe compatible with an old posterior medial left  occipital infarct in the left posterior cerebral artery territory and  there is a 2.1 x 1.4 x 2 cm area of low-density through the gray-white  matter of the posterior medial right occipital lobe and some 15 x 10  mm  area in the more anterior right occipital lobe compatible with infarct,  size uncertain but I suspect that they are subacute or chronic infarcts  in the right posterior cerebral artery territory. There is a 6 mm  irregular coarse dystrophic parenchymal calcification in the superior  left frontal white matter that is nonspecific. The ventricles are normal  in size. I see no mass effect and no midline shift. No extra-axial fluid  collections are identified and there is no evidence of acute  intracranial hemorrhage. No abnormal areas of enhancement are seen in  the head. The paranasal sinuses, mastoid air cells and middle ear  cavities are clear.     CT PERFUSION OF THE HEAD: The bolus of contrast was blown as IV  extravasated during the CT perfusion exam and therefore it is  nondiagnostic.     CT ANGIOGRAM OF THE HEAD AND NECK: The nasopharynx is within normal  limits. There is collapse of the right oropharyngeal airway. There is  some mild submucosal edema and thickening in the anterior and right  lateral oropharynx that may be post radiation change. The true cords and  subglottic airway are normal in appearance. The thyroid gland enhances  homogeneously and is normal in appearance. The lung apices demonstrate  some biapical emphysematous change but otherwise the lung apices are  clear. 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 On prior chest CT, it measured 3.5 x 2.3 cm in medial  lateral and anterior posterior dimensions. It is incompletely  characterized on this exam. 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 There are  calcified plaques in the aortic arch. There is anatomic origin of the  great vessels off the aortic arch. The left subclavian artery origin is  within normal limits, no stenosis seen left subclavian artery. There is  a focal mild to moderate stenosis at the left vertebral artery origin  but  beyond its origin the left vertebral artery appears to be widely  patent. It gives off the left PICA as it pierces the dura and extends  intracranially. Post PICA segment is somewhat hypoplastic but patent to  the vertebrobasilar junction. The left common carotid origin is normal  in comparison. There is circumferential mixed calcified and noncalcified  plaque circumscribing the left common carotid bifurcation. The plaque  extends into the origin and proximal aspect of the left internal carotid  artery. There are multiple deep ulcerations in the anterior wall of a  noncalcified component to the plaque. Plaque only results in a mild,  less than 20% stenosis proximal left internal carotid artery using the  NASCET criteria. The brachiocephalic artery origin is normal in  appearance, no stenosis is seen in the brachiocephalic artery and its  bifurcation into the right subclavian common carotid artery is normal in  appearance, no stenosis is seen in the right subclavian artery. The  right vertebral artery origin is normal in appearance and the right  vertebral artery is normal in caliber from its origin to the  vertebrobasilar junction without stenosis. The right common carotid  origin is normal in appearance. No stenosis is seen in the right common  carotid artery. There is mixed calcified and noncalcified plaque  extending from the right common carotid bifurcation into the origin of  the right internal carotid artery but there is no stenosis in the right  internal carotid artery using the NASCET criteria.     CT angiogram images through the head demonstrate a filling defect in the  distal basilar artery compatible with a thrombus or embolus. It measures  about 4.5 mm in length and 3 mm in transverse diameter. It is proximal  to the superior cerebellar artery origin. There is a well-developed  posterior communicating artery bilaterally off the posterior walls of  the supracavernous internal carotid arteries that then  fill the P1  segments and retrograde fill the basilar tip in the superior cerebellar  arteries as well as supply blood flow to the P2 segments of the  posterior cerebral arteries. There does appear to be a cut-off of distal  P3 segment of the right posterior cerebral artery, suspicious for an  occlusion by an embolus or thrombus. The upper cervical, petrous,  cavernous and supracavernous segments of the internal carotid arteries  are normal in appearance. The anterior and middle cerebral arteries are  normal in appearance.       Impression:       1. There is mild to moderate small vessel disease in the cerebral white  matter. There is a 1.7 x 1.2 cm area of encephalomalacia consistent with  an old infarct in the posterior medial left occipital lobe within the  left posterior cerebral artery territory and there is a 2.1 x 1.4 x 2 cm  area of low-density through the gray-white matter of the posterior  medial right occipital lobe consistent with an infarct and the precise  age of this infarct is uncertain. It could be anywhere from acute to  chronic and there is a 1.5 cm old infarct in the anterior medial right  occipital lobe in the right posterior cerebral artery territory. There  is a mild to moderate stenosis at the left vertebral origin and mild  stenosis of the left vertebral artery as it pierces the dura and extends  intracranially and the post PICA segment of the distal left vertebral  artery is small but patent and the dominant right vertebral artery is  widely patent from its origin at the vertebrobasilar junction. In the  distal basilar artery extending to just proximal to the superior  cerebellar artery origins, there is abrupt cut-off of the contrast  column by a 3-4 mm long clot or embolus to the distal basilar artery.  There is reconstitution of the basilar tip via retrograde flow by  bilateral posterior communicating arteries that refill the basilar tip  and also fill the superior cerebellar arteries  and the P1, P2 and P3  branches of the posterior cerebral arteries bilaterally, however there  is abrupt occlusion of the distal P3 segment right posterior cerebral  artery likely by an embolus. Infarcts in the occipital lobe could be  better dated with an MRI of the head which I recommend for further  evaluation.  2. 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 It is only partially visualized on this  exam. Calcified aneurysmal on prior CT measured 3.5 x 2.3 cm in  transverse dimension and could be recharacterized with a repeat chest  CT. There are emphysematous changes in the lung apices.  3. There is a mixed calcified and noncalcified deeply ulcerated plaque  that involves the left common carotid artery origin and proximal left  internal carotid artery that results in a 20% stenosis proximal left  internal carotid artery using the NASCET criteria.   4. This patient has a prior history of squamous cell carcinoma of the  neck and there is some collapse of the right side of the oropharynx  which limits evaluation for residual mucosal lesion and I recommend  correlation with direct visualization of the pharynx. The remainder of  the CT angiogram of the head and neck is within normal limits.       The results were reviewed with Bradford Gordon in person on 04/12/2018  at 11:40 AM.     Radiation dose reduction techniques were utilized, including automated  exposure control and exposure modulation based on body size.     This report was finalized on 4/12/2018 5:24 PM by Dr. Davis Castillo MD.       XR Chest 1 View [[id number]] Collected:  04/12/18 1248     Updated:  04/12/18 1253    Narrative:       XR CHEST 1 VW-     HISTORY: Male who is 77 years-old,  stroke     TECHNIQUE: Frontal views of the chest     COMPARISON: None available     FINDINGS: Heart size is normal. Aorta is tortuous. Slight  prominence of  vascular and interstitial markings. No focal pulmonary consolidation,  pleural effusion, or pneumothorax. No acute osseous process.       Impression:       No focal pulmonary consolidation. Tortuous aorta. Follow-up  as clinically indicated.     This report was finalized on 4/12/2018 12:50 PM by Dr. Jarad Kothari MD.           --    Discharge Disposition  Home or Self Care    Discharge Medications   Maurice Jalen MADISYN   Home Medication Instructions LANI:[id number]    Printed on:04/16/18 1534   Medication Information                      amLODIPine (NORVASC) 10 MG tablet  Take 1 tablet by mouth Daily.             apixaban (ELIQUIS) 5 MG tablet tablet  Take 1 tablet by mouth Every 12 (Twelve) Hours.             atorvastatin (LIPITOR) 20 MG tablet  Take 20 mg by mouth Daily.             finasteride (PROSCAR) 5 MG tablet  Take 1 tablet by mouth Daily.             levothyroxine (SYNTHROID, LEVOTHROID) 50 MCG tablet  Take 50 mcg by mouth Daily.             lisinopril-hydrochlorothiazide (PRINZIDE,ZESTORETIC) 20-25 MG per tablet  Take 1 tablet by mouth Daily.                 Discharge Diet: cardiac diet    Activity at Discharge:     Follow-up Information     TJ Ariza. Schedule an appointment as soon as possible for a visit in 30 day(s).    Specialties:  Neurology, Nurse Practitioner  Why:  If urology wants to do a cystoscopy please call Neurology first for clearance. DO NOT STOP TAKING ELIQUIS UNTIL CLEARED BY NEUROLOGY  Contact information:  [address] [address] [phone]             No Known Provider Follow up.    Contact information:  [facility] [address]             Davis Yarbrough MD. Schedule an appointment as soon as possible for a visit in 1 week(s).    Specialty:  Urology  Why:  Office will call to make appointment, they may then discuss a cystoscopy  Contact information:  [address] [place] [address] [place]  [address]  [phone]             Cyrus Renteria MD. Schedule an appointment as soon as possible for a visit in 1 week(s).    Specialty:  Cardiology  Why:  Office will call to make appointment, you will need to have a SHANNON done  Contact information:  [address]  [place] [id number]  [phone]                 See primary care provider, No Known Provider, in 1-2 weeks        Test Results Pending at Discharge       Miquel Gutierrez MD  [facility], [facility]  Pulmonary and Critical Care Medicine  04/16/18  3:36 PM    Time: greater than 30 minutes.        Total time spent discharging patient including evaluation,post hospitalization follow up,  medication and post hospitalization instructions and education total time exceeds 30 minutes.

## 2018-04-16 NOTE — PROGRESS NOTES
Events/plans reviewed.  No need for SHANNON given systemic anticoagulation.    ADD:    Discussed with TJ Ariza.  As they are concerned about stopping OAC to do cystoscopy, they do indeed want a SHANNON.  Will arrange to have performed as an outpatient this week.  I d/w patient by phone and he is in agreement.

## 2018-04-16 NOTE — PROGRESS NOTES
Continued Stay Note  [facility]     Patient Name: Jalen Richards  MRN: [id number]  Today's Date: 4/16/2018    Admit Date: 4/12/2018          Discharge Plan     Row Name 04/16/18 1149       Plan    Plan Comments Order for walker obtained and faxed to [facility] for STAT pick-up by daughter.  Original print copy given to family.                        Cornelio Saleh RN

## 2018-04-16 NOTE — DISCHARGE INSTRUCTIONS
Ideal targets for stroke prevention would be :  Blood pressure < 130/80; B12 > 500 TSH in normal range and LDL < 70; HbA1c < 6.5 and smoking cessation if applicable.

## 2018-04-16 NOTE — PLAN OF CARE
Problem: Patient Care Overview  Goal: Plan of Care Review   04/16/18 0855   Coping/Psychosocial   Plan of Care Reviewed With patient   OTHER   Outcome Summary Improved tolerance to functional activity this day with continuation of ambulation (400 feet) as well as initiation of standing ther. ex. program. Pt. with x 1 loss of balance during gait due to narrow base of support. Verbal cues and demonstration given to correct this pattern.

## 2018-04-16 NOTE — THERAPY TREATMENT NOTE
"Acute Care - Physical Therapy Treatment Note  [facility]     Patient Name: Jalen Richards  : 1940  MRN: [id number]  Today's Date: 2018  Onset of Illness/Injury or Date of Surgery: 18          Admit Date: 2018    Visit Dx:    ICD-10-CM ICD-9-CM   1. Basilar artery occlusion with cerebral infarction I63.22 433.01   2. Elevated troponin R74.8 790.6   3. Gait abnormality R26.9 781.2     Patient Active Problem List   Diagnosis   • Basilar artery occlusion with cerebral infarction       Therapy Treatment          Rehabilitation Treatment Summary     Row Name 18 0851             Treatment Time/Intention    Discipline physical therapist  -MS      Document Type therapy note (daily note)  -MS      Subjective Information complains of;fatigue  -MS      Mode of Treatment individual therapy  -MS      Patient Effort good  -MS      Comment Pt. reports fatigue this AM as he has had trouble sleeping at night since being admitted to the hospital.  Otherwise, pt. reports feeling \"okay\".  -MS      Existing Precautions/Restrictions --   No exit alarm prior to P.T. session.  -MS      Recorded by [MS] Kenyon Obrien, PT 18 0854 18 0854      Row Name 18 0851             Cognitive Assessment/Intervention- PT/OT    Orientation Status (Cognition) oriented x 3  -MS      Follows Commands (Cognition) WNL  -MS      Personal Safety Interventions fall prevention program maintained;gait belt;nonskid shoes/slippers when out of bed;supervised activity  -MS      Recorded by [MS] Kenyon Obrien, PT 18 0854 18 0854      Row Name 18 0851             Bed Mobility Assessment/Treatment    Supine-Sit [place] (Bed Mobility) independent  -MS      Sit-Supine [place] (Bed Mobility) independent  -MS      Recorded by [MS] Kenyon Obrien, PT 18 0854 18 0854      Row Name 18 0851             Transfer Assessment/Treatment    Sit-Stand [place] (Transfers) " supervision  -MS      Stand-Sit [place] (Transfers) supervision  -MS      Recorded by [MS] Kenyon Cotayder, PT 04/16/18 0854 04/16/18 0854      Row Name 04/16/18 0851             Gait/Stairs Assessment/Training    [place] Level (Gait) contact guard  -MS      Distance in Feet (Gait) 400 feet  -MS      Pattern (Gait) step-through  -MS      Deviations/Abnormal Patterns (Gait) base of support, narrow  -MS      Comment (Gait/Stairs) Loss of balance x 1 due to narrow base of support  -MS      Recorded by [MS] Kenyon BRIGHT Camille, PT 04/16/18 0854 04/16/18 0854      Row Name 04/16/18 0851             Therapeutic Exercise    Comment (Therapeutic Exercise) Pt. performed standing Heel Raises, Hip Abd, Mini-squats, Hip Extensions x 10 reps  -MS      Recorded by [MS] Kenyon BRIGHT Camille, PT 04/16/18 0854 04/16/18 0854      Row Name 04/16/18 0851             Positioning and Restraints    Pre-Treatment Position in bed  -MS      Post Treatment Position bed  -MS      In Bed notified nsg;supine;call light within reach;encouraged to call for assist  -MS      Recorded by [MS] Kenyon Cotayder, PT 04/16/18 0854 04/16/18 0854      Row Name 04/16/18 0851             Pain Assessment    Additional Documentation Pain Scale: Numbers Pre/Post-Treatment (Group)  -MS      Recorded by [MS] Kenyon Cotayder, PT 04/16/18 0854 04/16/18 0854      Row Name 04/16/18 0851             Pain Scale: Numbers Pre/Post-Treatment    Pain Scale: Numbers, Pretreatment 0/10 - no pain  -MS      Pain Scale: Numbers, Post-Treatment 0/10 - no pain  -MS      Recorded by [MS] Kenyon Cotayder, PT 04/16/18 0854 04/16/18 0854        User Key  (r) = Recorded By, (t) = Taken By, (c) = Cosigned By    Initials Name Effective Dates Discipline    MS Kenyon Obrien, PT 04/03/18 -  PT                     Physical Therapy Education     Title: PT OT SLP Therapies (Done)     Topic: Physical Therapy (Done)     Point: Mobility training (Done)    Learning Progress Summary      Learner Status Readiness Method Response Comment Documented by    Patient Done Acceptance E,D VU,NR  MS 04/16/18 0855     Done Acceptance E VU,NR   04/14/18 1007     Done Acceptance E,TB VU,DU  PV 04/13/18 1435          Point: Home exercise program (Done)    Learning Progress Summary     Learner Status Readiness Method Response Comment Documented by    Patient Done Acceptance E,D VU,NR  MS 04/16/18 0855     Done Acceptance E VU,NR   04/14/18 1007     Done Acceptance E,TB VU,DU  PV 04/13/18 1435          Point: Body mechanics (Done)    Learning Progress Summary     Learner Status Readiness Method Response Comment Documented by    Patient Done Acceptance E,D VU,NR  MS 04/16/18 0855     Done Acceptance E,TB VU,DU  PV 04/13/18 1435          Point: Precautions (Done)    Learning Progress Summary     Learner Status Readiness Method Response Comment Documented by    Patient Done Acceptance E,D VU,NR  MS 04/16/18 0855     Done Acceptance E,TB VU,DU  PV 04/13/18 1435                      User Key     Initials Effective Dates Name Provider Type Discipline     04/03/18 -  Prachi Kaur, PT Physical Therapist PT    MS 04/03/18 -  Kenyon Obrien, PT Physical Therapist PT     03/07/18 -  Koby Horton, PT Student PT Student PT                    PT Recommendation and Plan     Plan of Care Reviewed With: patient  Outcome Summary: Improved tolerance to functional activity this day with continuation of ambulation (400 feet) as well as initiation of standing ther. ex. program.  Pt. with x 1 loss of balance during gait due to narrow base of support.  Verbal cues and demonstration given to correct this pattern.          Outcome Measures     Row Name 04/16/18 0800 04/14/18 1009 04/13/18 1400       How much help from another person do you currently need...    Turning from your back to your side while in flat bed without using bedrails? 4  -MS 4  -KH 4  -CH (r) PV (t) CH (c)    Moving from lying on back to sitting on the side of  a flat bed without bedrails? 4  -MS 4  -KH 4  -CH (r) PV (t) CH (c)    Moving to and from a bed to a chair (including a wheelchair)? 4  -MS 4  -KH 4  -CH (r) PV (t) CH (c)    Standing up from a chair using your arms (e.g., wheelchair, bedside chair)? 4  -MS 4  -KH 4  -CH (r) PV (t) CH (c)    Climbing 3-5 steps with a railing? 3  -MS 3  -KH 3  -CH (r) PV (t) CH (c)    To walk in hospital room? 3  -MS 3  -KH 3  -CH (r) PV (t) CH (c)    AM-PAC 6 Clicks Score 22  -MS 22  -KH 22  -CH (r) PV (t)       Functional Assessment    Outcome Measure Options AM-PAC 6 Clicks Basic Mobility (PT)  -MS AM-PAC 6 Clicks Basic Mobility (PT)  -KH AM-PAC 6 Clicks Basic Mobility (PT)  -CH (r) PV (t) CH (c)      User Key  (r) = Recorded By, (t) = Taken By, (c) = Cosigned By    Initials Name Provider Type     Angie Sotomayor, PT Physical Therapist    CAROLE Kaur, PT Physical Therapist    MS Kenyon Obrien, PT Physical Therapist    CAROLE Horton, PT Student PT Student           Time Calculation:         PT Charges     Row Name 04/16/18 0856             Time Calculation    Start Time 0836  -MS      Stop Time 0850  -MS      Time Calculation (min) 14 min  -MS      PT Received On 04/16/18  -MS      PT - Next Appointment 04/17/18  -MS        User Key  (r) = Recorded By, (t) = Taken By, (c) = Cosigned By    Initials Name Provider Type    MS Kenyon Obrien, PT Physical Therapist          Therapy Charges for Today     Code Description Service Date Service Provider Modifiers Qty    72411329191 HC PT THER PROC EA 15 MIN 4/16/2018 Kenyon Obrien, PT GP, KX 1          PT G-Codes  Outcome Measure Options: AM-PAC 6 Clicks Basic Mobility (PT)    Kenyon Obrien, PT  4/16/2018

## 2018-04-17 LAB — CREAT BLDA-MCNC: 1 MG/DL (ref 0.6–1.3)

## 2018-04-19 ENCOUNTER — HOSPITAL ENCOUNTER (OUTPATIENT)
Dept: CARDIOLOGY | Facility: HOSPITAL | Age: 78
Discharge: HOME | End: 2018-04-19
Attending: INTERNAL MEDICINE | Admitting: INTERNAL MEDICINE

## 2018-04-19 VITALS
SYSTOLIC BLOOD PRESSURE: 120 MMHG | DIASTOLIC BLOOD PRESSURE: 59 MMHG | OXYGEN SATURATION: 92 % | RESPIRATION RATE: 18 BRPM | HEART RATE: 69 BPM

## 2018-04-19 DIAGNOSIS — I63.119 CEREBROVASCULAR ACCIDENT (CVA) DUE TO EMBOLISM OF VERTEBRAL ARTERY, UNSPECIFIED BLOOD VESSEL LATERALITY (HCC): ICD-10-CM

## 2018-04-19 DIAGNOSIS — I63.49 CEREBRAL INFARCTION DUE TO EMBOLISM OF OTHER CEREBRAL ARTERY (HCC): ICD-10-CM

## 2018-04-19 PROCEDURE — 76376 3D RENDER W/INTRP POSTPROCES: CPT

## 2018-04-19 PROCEDURE — 93325 DOPPLER ECHO COLOR FLOW MAPG: CPT

## 2018-04-19 PROCEDURE — 93325 DOPPLER ECHO COLOR FLOW MAPG: CPT | Performed by: INTERNAL MEDICINE

## 2018-04-19 PROCEDURE — 99153 MOD SED SAME PHYS/QHP EA: CPT | Performed by: INTERNAL MEDICINE

## 2018-04-19 PROCEDURE — 25010000002 FENTANYL CITRATE (PF) 100 MCG/2ML SOLUTION: Performed by: INTERNAL MEDICINE

## 2018-04-19 PROCEDURE — 93320 DOPPLER ECHO COMPLETE: CPT | Performed by: INTERNAL MEDICINE

## 2018-04-19 PROCEDURE — 93320 DOPPLER ECHO COMPLETE: CPT

## 2018-04-19 PROCEDURE — 87040 BLOOD CULTURE FOR BACTERIA: CPT | Performed by: INTERNAL MEDICINE

## 2018-04-19 PROCEDURE — 93312 ECHO TRANSESOPHAGEAL: CPT

## 2018-04-19 PROCEDURE — 93312 ECHO TRANSESOPHAGEAL: CPT | Performed by: INTERNAL MEDICINE

## 2018-04-19 PROCEDURE — 99152 MOD SED SAME PHYS/QHP 5/>YRS: CPT | Performed by: INTERNAL MEDICINE

## 2018-04-19 PROCEDURE — 25010000002 MIDAZOLAM PER 1 MG: Performed by: INTERNAL MEDICINE

## 2018-04-19 PROCEDURE — 76376 3D RENDER W/INTRP POSTPROCES: CPT | Performed by: INTERNAL MEDICINE

## 2018-04-19 RX ORDER — FENTANYL CITRATE 50 UG/ML
INJECTION, SOLUTION INTRAMUSCULAR; INTRAVENOUS
Status: COMPLETED | OUTPATIENT
Start: 2018-04-19 | End: 2018-04-19

## 2018-04-19 RX ORDER — MIDAZOLAM HYDROCHLORIDE 1 MG/ML
INJECTION INTRAMUSCULAR; INTRAVENOUS
Status: COMPLETED | OUTPATIENT
Start: 2018-04-19 | End: 2018-04-19

## 2018-04-19 RX ORDER — SODIUM CHLORIDE 9 MG/ML
INJECTION, SOLUTION INTRAVENOUS
Status: COMPLETED | OUTPATIENT
Start: 2018-04-19 | End: 2018-04-19

## 2018-04-19 RX ADMIN — MIDAZOLAM HYDROCHLORIDE 0.5 MG: 1 INJECTION, SOLUTION INTRAMUSCULAR; INTRAVENOUS at 10:06

## 2018-04-19 RX ADMIN — FENTANYL CITRATE 25 MCG: 50 INJECTION, SOLUTION INTRAMUSCULAR; INTRAVENOUS at 10:03

## 2018-04-19 RX ADMIN — MIDAZOLAM HYDROCHLORIDE 2 MG: 1 INJECTION, SOLUTION INTRAMUSCULAR; INTRAVENOUS at 10:03

## 2018-04-19 RX ADMIN — MIDAZOLAM HYDROCHLORIDE 0.5 MG: 1 INJECTION, SOLUTION INTRAMUSCULAR; INTRAVENOUS at 10:09

## 2018-04-19 RX ADMIN — SODIUM CHLORIDE 50 ML/HR: 9 INJECTION, SOLUTION INTRAVENOUS at 09:45

## 2018-04-19 RX ADMIN — FENTANYL CITRATE 12.5 MCG: 50 INJECTION, SOLUTION INTRAMUSCULAR; INTRAVENOUS at 10:06

## 2018-04-19 RX ADMIN — FENTANYL CITRATE 12.5 MCG: 50 INJECTION, SOLUTION INTRAMUSCULAR; INTRAVENOUS at 10:09

## 2018-04-20 ENCOUNTER — DOCUMENTATION (OUTPATIENT)
Dept: CARDIOLOGY | Facility: CLINIC | Age: 78
End: 2018-04-20

## 2018-04-20 LAB
BH CV ECHO MEAS - BSA(HAYCOCK): 2 M^2
BH CV ECHO MEAS - BSA: 2 M^2
BH CV ECHO MEAS - BZI_BMI: 24.7 KILOGRAMS/M^2
BH CV ECHO MEAS - BZI_METRIC_HEIGHT: 180.3 CM
BH CV ECHO MEAS - BZI_METRIC_WEIGHT: 80.3 KG
BH CV ECHO MEAS - MV A DUR: 0.15 SEC
BH CV ECHO MEAS - MV A MAX VEL: 112 CM/SEC
BH CV ECHO MEAS - MV DEC SLOPE: 217 CM/SEC^2
BH CV ECHO MEAS - MV DEC TIME: 0.37 SEC
BH CV ECHO MEAS - MV E MAX VEL: 62.4 CM/SEC
BH CV ECHO MEAS - MV E/A: 0.56
BH CV ECHO MEAS - MV MAX PG: 9.5 MMHG
BH CV ECHO MEAS - MV MEAN PG: 3 MMHG
BH CV ECHO MEAS - MV P1/2T MAX VEL: 97 CM/SEC
BH CV ECHO MEAS - MV P1/2T: 130.9 MSEC
BH CV ECHO MEAS - MV V2 MAX: 154 CM/SEC
BH CV ECHO MEAS - MV V2 MEAN: 75.5 CM/SEC
BH CV ECHO MEAS - MV V2 VTI: 41.4 CM
BH CV ECHO MEAS - MVA P1/2T LCG: 2.3 CM^2
BH CV ECHO MEAS - MVA(P1/2T): 1.7 CM^2
BH CV VAS BP RIGHT ARM: NORMAL MMHG

## 2018-04-20 NOTE — PROGRESS NOTES
I s/w Dr. Ryan Medel and I s/w his daughter by phone.    I see little evidence to think he had AF.  He may or may not have malignancy but we have no good compelling reason to take Eliquis long term.  He has significant aortic atheromatous disease and I favor statin plus DAPT for 3 mos.  The problem is that he needs a cystoscopy for hematuria and that's scheduled on 5/2.    He will start aspirin 81mg daily (he was taking this before hand) and continue Eliquis until two days before the cystoscopy.  His daughter will then call me the day after with the results, and as long as no malignancy, we'll start clopidogrel 75mg daily as well.      I will follow up with him in a month.    I sent blood cultures re: the MV findings but I don't suspect endocarditis.  I think this is rheumatic change.

## 2018-04-23 ENCOUNTER — APPOINTMENT (OUTPATIENT)
Dept: CT IMAGING | Facility: HOSPITAL | Age: 78
End: 2018-04-23

## 2018-04-23 ENCOUNTER — HOSPITAL ENCOUNTER (INPATIENT)
Facility: HOSPITAL | Age: 78
LOS: 8 days | Discharge: NURSING, REHAB OR LONG-TERM CARE | End: 2018-05-01
Attending: EMERGENCY MEDICINE | Admitting: INTERNAL MEDICINE

## 2018-04-23 ENCOUNTER — APPOINTMENT (OUTPATIENT)
Dept: GENERAL RADIOLOGY | Facility: HOSPITAL | Age: 78
End: 2018-04-23

## 2018-04-23 ENCOUNTER — TELEPHONE (OUTPATIENT)
Dept: NEUROLOGY | Facility: CLINIC | Age: 78
End: 2018-04-23

## 2018-04-23 DIAGNOSIS — M54.50 ACUTE MIDLINE LOW BACK PAIN WITHOUT SCIATICA: ICD-10-CM

## 2018-04-23 DIAGNOSIS — Z74.09 IMPAIRED FUNCTIONAL MOBILITY, BALANCE, AND ENDURANCE: ICD-10-CM

## 2018-04-23 DIAGNOSIS — D72.829 LEUKOCYTOSIS, UNSPECIFIED TYPE: ICD-10-CM

## 2018-04-23 DIAGNOSIS — N28.9 RENAL INSUFFICIENCY: ICD-10-CM

## 2018-04-23 DIAGNOSIS — R31.9 HEMATURIA, UNSPECIFIED TYPE: ICD-10-CM

## 2018-04-23 DIAGNOSIS — R53.1 GENERALIZED WEAKNESS: Primary | ICD-10-CM

## 2018-04-23 DIAGNOSIS — R77.8 ELEVATED TROPONIN: ICD-10-CM

## 2018-04-23 LAB
ALBUMIN SERPL-MCNC: 3.9 G/DL (ref 3.5–5.2)
ALBUMIN/GLOB SERPL: 1.1 G/DL
ALP SERPL-CCNC: 86 U/L (ref 39–117)
ALT SERPL W P-5'-P-CCNC: 21 U/L (ref 1–41)
ANION GAP SERPL CALCULATED.3IONS-SCNC: 17.7 MMOL/L
AST SERPL-CCNC: 43 U/L (ref 1–40)
BACTERIA UR QL AUTO: ABNORMAL /HPF
BASOPHILS # BLD AUTO: 0.02 10*3/MM3 (ref 0–0.2)
BASOPHILS NFR BLD AUTO: 0.1 % (ref 0–1.5)
BILIRUB SERPL-MCNC: 0.3 MG/DL (ref 0.1–1.2)
BILIRUB UR QL STRIP: NEGATIVE
BUN BLD-MCNC: 40 MG/DL (ref 8–23)
BUN/CREAT SERPL: 24.1 (ref 7–25)
CALCIUM SPEC-SCNC: 9.1 MG/DL (ref 8.6–10.5)
CHLORIDE SERPL-SCNC: 97 MMOL/L (ref 98–107)
CLARITY UR: ABNORMAL
CO2 SERPL-SCNC: 26.3 MMOL/L (ref 22–29)
COLOR UR: ABNORMAL
CREAT BLD-MCNC: 1.66 MG/DL (ref 0.76–1.27)
CRP SERPL-MCNC: 2.56 MG/DL (ref 0–0.5)
DEPRECATED RDW RBC AUTO: 43 FL (ref 37–54)
EOSINOPHIL # BLD AUTO: 0.02 10*3/MM3 (ref 0–0.7)
EOSINOPHIL NFR BLD AUTO: 0.1 % (ref 0.3–6.2)
ERYTHROCYTE [DISTWIDTH] IN BLOOD BY AUTOMATED COUNT: 12.5 % (ref 11.5–14.5)
GFR SERPL CREATININE-BSD FRML MDRD: 41 ML/MIN/1.73
GLOBULIN UR ELPH-MCNC: 3.4 GM/DL
GLUCOSE BLD-MCNC: 112 MG/DL (ref 65–99)
GLUCOSE UR STRIP-MCNC: NEGATIVE MG/DL
HCT VFR BLD AUTO: 37.3 % (ref 40.4–52.2)
HGB BLD-MCNC: 12 G/DL (ref 13.7–17.6)
HGB UR QL STRIP.AUTO: ABNORMAL
HOLD SPECIMEN: NORMAL
HOLD SPECIMEN: NORMAL
HYALINE CASTS UR QL AUTO: ABNORMAL /LPF
IMM GRANULOCYTES # BLD: 0.06 10*3/MM3 (ref 0–0.03)
IMM GRANULOCYTES NFR BLD: 0.3 % (ref 0–0.5)
KETONES UR QL STRIP: NEGATIVE
LEUKOCYTE ESTERASE UR QL STRIP.AUTO: NEGATIVE
LYMPHOCYTES # BLD AUTO: 0.95 10*3/MM3 (ref 0.9–4.8)
LYMPHOCYTES NFR BLD AUTO: 5.2 % (ref 19.6–45.3)
MAGNESIUM SERPL-MCNC: 2.4 MG/DL (ref 1.6–2.4)
MCH RBC QN AUTO: 30.5 PG (ref 27–32.7)
MCHC RBC AUTO-ENTMCNC: 32.2 G/DL (ref 32.6–36.4)
MCV RBC AUTO: 94.9 FL (ref 79.8–96.2)
MONOCYTES # BLD AUTO: 0.84 10*3/MM3 (ref 0.2–1.2)
MONOCYTES NFR BLD AUTO: 4.6 % (ref 5–12)
NEUTROPHILS # BLD AUTO: 16.53 10*3/MM3 (ref 1.9–8.1)
NEUTROPHILS NFR BLD AUTO: 90 % (ref 42.7–76)
NITRITE UR QL STRIP: NEGATIVE
PH UR STRIP.AUTO: 5.5 [PH] (ref 5–8)
PLATELET # BLD AUTO: 100 10*3/MM3 (ref 140–500)
PMV BLD AUTO: 11 FL (ref 6–12)
POTASSIUM BLD-SCNC: 4 MMOL/L (ref 3.5–5.2)
PROCALCITONIN SERPL-MCNC: 0.12 NG/ML (ref 0.1–0.25)
PROT SERPL-MCNC: 7.3 G/DL (ref 6–8.5)
PROT UR QL STRIP: ABNORMAL
RBC # BLD AUTO: 3.93 10*6/MM3 (ref 4.6–6)
RBC # UR: ABNORMAL /HPF
REF LAB TEST METHOD: ABNORMAL
SODIUM BLD-SCNC: 141 MMOL/L (ref 136–145)
SP GR UR STRIP: >=1.03 (ref 1–1.03)
SQUAMOUS #/AREA URNS HPF: ABNORMAL /HPF
TROPONIN T SERPL-MCNC: 0.54 NG/ML (ref 0–0.03)
UROBILINOGEN UR QL STRIP: ABNORMAL
WBC CASTS #/AREA URNS LPF: ABNORMAL /LPF
WBC CLUMPS # UR AUTO: ABNORMAL /HPF
WBC NRBC COR # BLD: 18.36 10*3/MM3 (ref 4.5–10.7)
WBC UR QL AUTO: ABNORMAL /HPF
WHOLE BLOOD HOLD SPECIMEN: NORMAL
WHOLE BLOOD HOLD SPECIMEN: NORMAL

## 2018-04-23 PROCEDURE — 83735 ASSAY OF MAGNESIUM: CPT

## 2018-04-23 PROCEDURE — 71045 X-RAY EXAM CHEST 1 VIEW: CPT

## 2018-04-23 PROCEDURE — 84145 PROCALCITONIN (PCT): CPT | Performed by: EMERGENCY MEDICINE

## 2018-04-23 PROCEDURE — 84484 ASSAY OF TROPONIN QUANT: CPT

## 2018-04-23 PROCEDURE — 93005 ELECTROCARDIOGRAM TRACING: CPT

## 2018-04-23 PROCEDURE — 72110 X-RAY EXAM L-2 SPINE 4/>VWS: CPT

## 2018-04-23 PROCEDURE — 86140 C-REACTIVE PROTEIN: CPT | Performed by: EMERGENCY MEDICINE

## 2018-04-23 PROCEDURE — 87086 URINE CULTURE/COLONY COUNT: CPT

## 2018-04-23 PROCEDURE — 81001 URINALYSIS AUTO W/SCOPE: CPT

## 2018-04-23 PROCEDURE — 70450 CT HEAD/BRAIN W/O DYE: CPT

## 2018-04-23 PROCEDURE — 80053 COMPREHEN METABOLIC PANEL: CPT

## 2018-04-23 PROCEDURE — 99285 EMERGENCY DEPT VISIT HI MDM: CPT

## 2018-04-23 PROCEDURE — 85652 RBC SED RATE AUTOMATED: CPT | Performed by: EMERGENCY MEDICINE

## 2018-04-23 PROCEDURE — 93010 ELECTROCARDIOGRAM REPORT: CPT | Performed by: INTERNAL MEDICINE

## 2018-04-23 PROCEDURE — 85025 COMPLETE CBC W/AUTO DIFF WBC: CPT

## 2018-04-23 RX ORDER — CEFTRIAXONE SODIUM 1 G/50ML
1 INJECTION, SOLUTION INTRAVENOUS ONCE
Status: COMPLETED | OUTPATIENT
Start: 2018-04-23 | End: 2018-04-24

## 2018-04-23 RX ORDER — SODIUM CHLORIDE 0.9 % (FLUSH) 0.9 %
10 SYRINGE (ML) INJECTION AS NEEDED
Status: DISCONTINUED | OUTPATIENT
Start: 2018-04-23 | End: 2018-05-01 | Stop reason: HOSPADM

## 2018-04-23 NOTE — TELEPHONE ENCOUNTER
Patient's daughter called and stated when the patient was d/c thurs he could walk without a walker. She stated Friday am he could not take a step, had to use a walker and could barely lift a fork or a cup to his mouth. She stated he is leaning to the right side. I s/w María and Dr. Gordon and they recommend the patient come to ER. I s/w patient's daughter and she is aware.

## 2018-04-24 ENCOUNTER — APPOINTMENT (OUTPATIENT)
Dept: CARDIOLOGY | Facility: HOSPITAL | Age: 78
End: 2018-04-24
Attending: SURGERY

## 2018-04-24 ENCOUNTER — APPOINTMENT (OUTPATIENT)
Dept: CT IMAGING | Facility: HOSPITAL | Age: 78
End: 2018-04-24
Attending: INTERNAL MEDICINE

## 2018-04-24 ENCOUNTER — APPOINTMENT (OUTPATIENT)
Dept: MRI IMAGING | Facility: HOSPITAL | Age: 78
End: 2018-04-24

## 2018-04-24 PROBLEM — D69.6 THROMBOCYTOPENIA (HCC): Status: ACTIVE | Noted: 2018-04-24

## 2018-04-24 PROBLEM — N17.9 AKI (ACUTE KIDNEY INJURY) (HCC): Status: ACTIVE | Noted: 2018-04-24

## 2018-04-24 PROBLEM — R31.9 HEMATURIA: Status: ACTIVE | Noted: 2018-04-24

## 2018-04-24 PROBLEM — R23.0 TOE CYANOSIS: Status: ACTIVE | Noted: 2018-04-24

## 2018-04-24 PROBLEM — I73.00 RAYNAUD'S DISEASE WITHOUT GANGRENE: Status: ACTIVE | Noted: 2018-04-24

## 2018-04-24 PROBLEM — N40.0 BPH (BENIGN PROSTATIC HYPERPLASIA): Status: ACTIVE | Noted: 2018-04-24

## 2018-04-24 PROBLEM — I10 HYPERTENSION: Status: ACTIVE | Noted: 2018-04-24

## 2018-04-24 PROBLEM — N12 PYELONEPHRITIS: Status: ACTIVE | Noted: 2018-04-24

## 2018-04-24 LAB
ALBUMIN SERPL-MCNC: 3.4 G/DL (ref 3.5–5.2)
ALBUMIN/GLOB SERPL: 1.2 G/DL
ALP SERPL-CCNC: 75 U/L (ref 39–117)
ALT SERPL W P-5'-P-CCNC: 30 U/L (ref 1–41)
ANION GAP SERPL CALCULATED.3IONS-SCNC: 16.6 MMOL/L
ANISOCYTOSIS BLD QL: NORMAL
APTT PPP: 33.2 SECONDS (ref 22.7–35.4)
APTT PPP: 53.1 SECONDS (ref 22.7–35.4)
AST SERPL-CCNC: 55 U/L (ref 1–40)
BACTERIA SPEC AEROBE CULT: NORMAL
BACTERIA SPEC AEROBE CULT: NORMAL
BASOPHILS # BLD AUTO: 0.01 10*3/MM3 (ref 0–0.2)
BASOPHILS # BLD AUTO: 0.02 10*3/MM3 (ref 0–0.2)
BASOPHILS NFR BLD AUTO: 0 % (ref 0–1.5)
BASOPHILS NFR BLD AUTO: 0.1 % (ref 0–1.5)
BILIRUB SERPL-MCNC: 0.2 MG/DL (ref 0.1–1.2)
BUN BLD-MCNC: 35 MG/DL (ref 8–23)
BUN/CREAT SERPL: 20.5 (ref 7–25)
CALCIUM SPEC-SCNC: 8.7 MG/DL (ref 8.6–10.5)
CHLORIDE SERPL-SCNC: 100 MMOL/L (ref 98–107)
CHLORIDE UR-SCNC: 99 MMOL/L
CK SERPL-CCNC: 108 U/L (ref 20–200)
CO2 SERPL-SCNC: 23.4 MMOL/L (ref 22–29)
CREAT BLD-MCNC: 1.71 MG/DL (ref 0.76–1.27)
CREAT UR-MCNC: 114.3 MG/DL
D-LACTATE SERPL-SCNC: 1 MMOL/L (ref 0.5–2)
DEPRECATED RDW RBC AUTO: 41.6 FL (ref 37–54)
DEPRECATED RDW RBC AUTO: 42 FL (ref 37–54)
EOSINOPHIL # BLD AUTO: 0.01 10*3/MM3 (ref 0–0.7)
EOSINOPHIL # BLD AUTO: 0.03 10*3/MM3 (ref 0–0.7)
EOSINOPHIL NFR BLD AUTO: 0 % (ref 0.3–6.2)
EOSINOPHIL NFR BLD AUTO: 0.2 % (ref 0.3–6.2)
EOSINOPHIL SPEC QL MICRO: 0 % EOS/100 CELLS (ref 0–0)
ERYTHROCYTE [DISTWIDTH] IN BLOOD BY AUTOMATED COUNT: 12.2 % (ref 11.5–14.5)
ERYTHROCYTE [DISTWIDTH] IN BLOOD BY AUTOMATED COUNT: 12.4 % (ref 11.5–14.5)
ERYTHROCYTE [SEDIMENTATION RATE] IN BLOOD: 22 MM/HR (ref 0–20)
GFR SERPL CREATININE-BSD FRML MDRD: 39 ML/MIN/1.73
GLOBULIN UR ELPH-MCNC: 2.8 GM/DL
GLUCOSE BLD-MCNC: 106 MG/DL (ref 65–99)
HCT VFR BLD AUTO: 34.9 % (ref 40.4–52.2)
HCT VFR BLD AUTO: 35.7 % (ref 40.4–52.2)
HCT VFR BLD AUTO: 36.9 % (ref 40.4–52.2)
HGB BLD-MCNC: 11.5 G/DL (ref 13.7–17.6)
HGB BLD-MCNC: 11.6 G/DL (ref 13.7–17.6)
HGB BLD-MCNC: 12.2 G/DL (ref 13.7–17.6)
IMM GRANULOCYTES # BLD: 0.06 10*3/MM3 (ref 0–0.03)
IMM GRANULOCYTES # BLD: 0.15 10*3/MM3 (ref 0–0.03)
IMM GRANULOCYTES NFR BLD: 0.3 % (ref 0–0.5)
IMM GRANULOCYTES NFR BLD: 0.5 % (ref 0–0.5)
INR PPP: 1.18 (ref 0.9–1.1)
INR PPP: 1.19 (ref 0.9–1.1)
LYMPHOCYTES # BLD AUTO: 0.85 10*3/MM3 (ref 0.9–4.8)
LYMPHOCYTES # BLD AUTO: 1.36 10*3/MM3 (ref 0.9–4.8)
LYMPHOCYTES NFR BLD AUTO: 4.3 % (ref 19.6–45.3)
LYMPHOCYTES NFR BLD AUTO: 4.6 % (ref 19.6–45.3)
MAGNESIUM SERPL-MCNC: 2.2 MG/DL (ref 1.6–2.4)
MCH RBC QN AUTO: 30.4 PG (ref 27–32.7)
MCH RBC QN AUTO: 31 PG (ref 27–32.7)
MCHC RBC AUTO-ENTMCNC: 32.5 G/DL (ref 32.6–36.4)
MCHC RBC AUTO-ENTMCNC: 33.1 G/DL (ref 32.6–36.4)
MCV RBC AUTO: 93.7 FL (ref 79.8–96.2)
MCV RBC AUTO: 93.9 FL (ref 79.8–96.2)
MONOCYTES # BLD AUTO: 1.03 10*3/MM3 (ref 0.2–1.2)
MONOCYTES # BLD AUTO: 1.5 10*3/MM3 (ref 0.2–1.2)
MONOCYTES NFR BLD AUTO: 4.7 % (ref 5–12)
MONOCYTES NFR BLD AUTO: 5.6 % (ref 5–12)
NEUTROPHILS # BLD AUTO: 16.57 10*3/MM3 (ref 1.9–8.1)
NEUTROPHILS # BLD AUTO: 28.75 10*3/MM3 (ref 1.9–8.1)
NEUTROPHILS NFR BLD AUTO: 89.5 % (ref 42.7–76)
NEUTROPHILS NFR BLD AUTO: 90.5 % (ref 42.7–76)
PLAT MORPH BLD: NORMAL
PLAT MORPH BLD: NORMAL
PLATELET # BLD AUTO: 113 10*3/MM3 (ref 140–500)
PLATELET # BLD AUTO: 96 10*3/MM3 (ref 140–500)
PMV BLD AUTO: 10.4 FL (ref 6–12)
PMV BLD AUTO: 10.9 FL (ref 6–12)
POTASSIUM BLD-SCNC: 3.8 MMOL/L (ref 3.5–5.2)
PROT SERPL-MCNC: 6.2 G/DL (ref 6–8.5)
PROT UR-MCNC: 142 MG/DL
PROTHROMBIN TIME: 14.8 SECONDS (ref 11.7–14.2)
PROTHROMBIN TIME: 14.9 SECONDS (ref 11.7–14.2)
RBC # BLD AUTO: 3.81 10*6/MM3 (ref 4.6–6)
RBC # BLD AUTO: 3.93 10*6/MM3 (ref 4.6–6)
RBC MORPH BLD: NORMAL
SODIUM BLD-SCNC: 140 MMOL/L (ref 136–145)
SODIUM UR-SCNC: 82 MMOL/L
TROPONIN T SERPL-MCNC: 0.47 NG/ML (ref 0–0.03)
TROPONIN T SERPL-MCNC: 0.52 NG/ML (ref 0–0.03)
TROPONIN T SERPL-MCNC: 0.52 NG/ML (ref 0–0.03)
WBC MORPH BLD: NORMAL
WBC MORPH BLD: NORMAL
WBC NRBC COR # BLD: 18.5 10*3/MM3 (ref 4.5–10.7)
WBC NRBC COR # BLD: 31.78 10*3/MM3 (ref 4.5–10.7)

## 2018-04-24 PROCEDURE — 85610 PROTHROMBIN TIME: CPT | Performed by: HOSPITALIST

## 2018-04-24 PROCEDURE — 87040 BLOOD CULTURE FOR BACTERIA: CPT | Performed by: EMERGENCY MEDICINE

## 2018-04-24 PROCEDURE — 84484 ASSAY OF TROPONIN QUANT: CPT | Performed by: INTERNAL MEDICINE

## 2018-04-24 PROCEDURE — 84300 ASSAY OF URINE SODIUM: CPT | Performed by: INTERNAL MEDICINE

## 2018-04-24 PROCEDURE — 74176 CT ABD & PELVIS W/O CONTRAST: CPT

## 2018-04-24 PROCEDURE — 85014 HEMATOCRIT: CPT | Performed by: HOSPITALIST

## 2018-04-24 PROCEDURE — 25010000002 HEPARIN (PORCINE) PER 1000 UNITS: Performed by: HOSPITALIST

## 2018-04-24 PROCEDURE — 93923 UPR/LXTR ART STDY 3+ LVLS: CPT

## 2018-04-24 PROCEDURE — 70551 MRI BRAIN STEM W/O DYE: CPT

## 2018-04-24 PROCEDURE — 85018 HEMOGLOBIN: CPT | Performed by: HOSPITALIST

## 2018-04-24 PROCEDURE — 25010000002 CEFTRIAXONE PER 250 MG: Performed by: EMERGENCY MEDICINE

## 2018-04-24 PROCEDURE — 82570 ASSAY OF URINE CREATININE: CPT | Performed by: INTERNAL MEDICINE

## 2018-04-24 PROCEDURE — 87205 SMEAR GRAM STAIN: CPT | Performed by: INTERNAL MEDICINE

## 2018-04-24 PROCEDURE — 83605 ASSAY OF LACTIC ACID: CPT | Performed by: HOSPITALIST

## 2018-04-24 PROCEDURE — 85610 PROTHROMBIN TIME: CPT | Performed by: INTERNAL MEDICINE

## 2018-04-24 PROCEDURE — 80069 RENAL FUNCTION PANEL: CPT | Performed by: INTERNAL MEDICINE

## 2018-04-24 PROCEDURE — 85730 THROMBOPLASTIN TIME PARTIAL: CPT | Performed by: HOSPITALIST

## 2018-04-24 PROCEDURE — 85007 BL SMEAR W/DIFF WBC COUNT: CPT | Performed by: INTERNAL MEDICINE

## 2018-04-24 PROCEDURE — 83735 ASSAY OF MAGNESIUM: CPT | Performed by: INTERNAL MEDICINE

## 2018-04-24 PROCEDURE — 85025 COMPLETE CBC W/AUTO DIFF WBC: CPT | Performed by: INTERNAL MEDICINE

## 2018-04-24 PROCEDURE — 85025 COMPLETE CBC W/AUTO DIFF WBC: CPT | Performed by: HOSPITALIST

## 2018-04-24 PROCEDURE — 99222 1ST HOSP IP/OBS MODERATE 55: CPT | Performed by: INTERNAL MEDICINE

## 2018-04-24 PROCEDURE — 99406 BEHAV CHNG SMOKING 3-10 MIN: CPT | Performed by: RADIOLOGY

## 2018-04-24 PROCEDURE — 82550 ASSAY OF CK (CPK): CPT | Performed by: INTERNAL MEDICINE

## 2018-04-24 PROCEDURE — 84156 ASSAY OF PROTEIN URINE: CPT | Performed by: INTERNAL MEDICINE

## 2018-04-24 PROCEDURE — 25010000002 HYDROMORPHONE PER 4 MG: Performed by: INTERNAL MEDICINE

## 2018-04-24 PROCEDURE — 85007 BL SMEAR W/DIFF WBC COUNT: CPT | Performed by: HOSPITALIST

## 2018-04-24 PROCEDURE — 80053 COMPREHEN METABOLIC PANEL: CPT | Performed by: INTERNAL MEDICINE

## 2018-04-24 PROCEDURE — 99223 1ST HOSP IP/OBS HIGH 75: CPT | Performed by: RADIOLOGY

## 2018-04-24 PROCEDURE — 82436 ASSAY OF URINE CHLORIDE: CPT | Performed by: INTERNAL MEDICINE

## 2018-04-24 RX ORDER — FINASTERIDE 5 MG/1
5 TABLET, FILM COATED ORAL DAILY
Status: DISCONTINUED | OUTPATIENT
Start: 2018-04-24 | End: 2018-05-01 | Stop reason: HOSPADM

## 2018-04-24 RX ORDER — CEFTRIAXONE SODIUM 1 G/50ML
1 INJECTION, SOLUTION INTRAVENOUS EVERY 24 HOURS
Status: DISCONTINUED | OUTPATIENT
Start: 2018-04-25 | End: 2018-04-25

## 2018-04-24 RX ORDER — ACETAMINOPHEN 325 MG/1
650 TABLET ORAL EVERY 4 HOURS PRN
Status: DISCONTINUED | OUTPATIENT
Start: 2018-04-24 | End: 2018-05-01 | Stop reason: HOSPADM

## 2018-04-24 RX ORDER — ATORVASTATIN CALCIUM 20 MG/1
20 TABLET, FILM COATED ORAL DAILY
Status: DISCONTINUED | OUTPATIENT
Start: 2018-04-24 | End: 2018-04-30

## 2018-04-24 RX ORDER — SODIUM CHLORIDE 0.9 % (FLUSH) 0.9 %
1-10 SYRINGE (ML) INJECTION AS NEEDED
Status: DISCONTINUED | OUTPATIENT
Start: 2018-04-24 | End: 2018-05-01 | Stop reason: HOSPADM

## 2018-04-24 RX ORDER — NITROGLYCERIN 0.4 MG/1
0.4 TABLET SUBLINGUAL
Status: DISCONTINUED | OUTPATIENT
Start: 2018-04-24 | End: 2018-05-01 | Stop reason: HOSPADM

## 2018-04-24 RX ORDER — NALOXONE HCL 0.4 MG/ML
0.4 VIAL (ML) INJECTION
Status: DISCONTINUED | OUTPATIENT
Start: 2018-04-24 | End: 2018-05-01 | Stop reason: HOSPADM

## 2018-04-24 RX ORDER — ONDANSETRON 4 MG/1
4 TABLET, FILM COATED ORAL EVERY 6 HOURS PRN
Status: DISCONTINUED | OUTPATIENT
Start: 2018-04-24 | End: 2018-05-01 | Stop reason: HOSPADM

## 2018-04-24 RX ORDER — LEVOTHYROXINE SODIUM 0.05 MG/1
50 TABLET ORAL DAILY
Status: DISCONTINUED | OUTPATIENT
Start: 2018-04-24 | End: 2018-04-30

## 2018-04-24 RX ORDER — HYDROCODONE BITARTRATE AND ACETAMINOPHEN 5; 325 MG/1; MG/1
1 TABLET ORAL EVERY 4 HOURS PRN
Status: DISCONTINUED | OUTPATIENT
Start: 2018-04-24 | End: 2018-05-01 | Stop reason: HOSPADM

## 2018-04-24 RX ORDER — ONDANSETRON 4 MG/1
4 TABLET, ORALLY DISINTEGRATING ORAL EVERY 6 HOURS PRN
Status: DISCONTINUED | OUTPATIENT
Start: 2018-04-24 | End: 2018-05-01 | Stop reason: HOSPADM

## 2018-04-24 RX ORDER — SODIUM CHLORIDE 9 MG/ML
125 INJECTION, SOLUTION INTRAVENOUS CONTINUOUS
Status: DISCONTINUED | OUTPATIENT
Start: 2018-04-24 | End: 2018-04-28

## 2018-04-24 RX ORDER — HYDROMORPHONE HYDROCHLORIDE 1 MG/ML
0.5 INJECTION, SOLUTION INTRAMUSCULAR; INTRAVENOUS; SUBCUTANEOUS
Status: DISCONTINUED | OUTPATIENT
Start: 2018-04-24 | End: 2018-05-01 | Stop reason: HOSPADM

## 2018-04-24 RX ORDER — ONDANSETRON 2 MG/ML
4 INJECTION INTRAMUSCULAR; INTRAVENOUS EVERY 6 HOURS PRN
Status: DISCONTINUED | OUTPATIENT
Start: 2018-04-24 | End: 2018-05-01 | Stop reason: HOSPADM

## 2018-04-24 RX ADMIN — FINASTERIDE 5 MG: 5 TABLET, FILM COATED ORAL at 08:08

## 2018-04-24 RX ADMIN — SODIUM CHLORIDE 1000 ML: 9 INJECTION, SOLUTION INTRAVENOUS at 00:09

## 2018-04-24 RX ADMIN — HEPARIN SODIUM 15 UNITS/KG/HR: 10000 INJECTION, SOLUTION INTRAVENOUS at 23:22

## 2018-04-24 RX ADMIN — ATORVASTATIN CALCIUM 20 MG: 20 TABLET, FILM COATED ORAL at 08:08

## 2018-04-24 RX ADMIN — CEFTRIAXONE SODIUM 1 G: 1 INJECTION, SOLUTION INTRAVENOUS at 00:18

## 2018-04-24 RX ADMIN — SODIUM CHLORIDE 125 ML/HR: 9 INJECTION, SOLUTION INTRAVENOUS at 12:47

## 2018-04-24 RX ADMIN — SODIUM CHLORIDE 125 ML/HR: 9 INJECTION, SOLUTION INTRAVENOUS at 03:58

## 2018-04-24 RX ADMIN — LEVOTHYROXINE SODIUM 50 MCG: 50 TABLET ORAL at 08:08

## 2018-04-24 RX ADMIN — HYDROCODONE BITARTRATE AND ACETAMINOPHEN 1 TABLET: 5; 325 TABLET ORAL at 08:08

## 2018-04-24 RX ADMIN — HEPARIN SODIUM 12 UNITS/KG/HR: 10000 INJECTION, SOLUTION INTRAVENOUS at 16:42

## 2018-04-24 RX ADMIN — SODIUM CHLORIDE 125 ML/HR: 9 INJECTION, SOLUTION INTRAVENOUS at 21:52

## 2018-04-24 RX ADMIN — HYDROMORPHONE HYDROCHLORIDE 0.5 MG: 1 INJECTION, SOLUTION INTRAMUSCULAR; INTRAVENOUS; SUBCUTANEOUS at 10:16

## 2018-04-25 PROBLEM — I73.00 RAYNAUD'S DISEASE WITHOUT GANGRENE: Status: RESOLVED | Noted: 2018-04-24 | Resolved: 2018-04-25

## 2018-04-25 PROBLEM — R80.9 PROTEINURIA: Status: ACTIVE | Noted: 2018-04-25

## 2018-04-25 PROBLEM — I74.4 PERIPHERAL ARTERY EMBOLISM OR THROMBOSIS (HCC): Status: ACTIVE | Noted: 2018-04-25

## 2018-04-25 LAB
ALBUMIN SERPL-MCNC: 3.4 G/DL (ref 3.5–5.2)
ANION GAP SERPL CALCULATED.3IONS-SCNC: 13.1 MMOL/L
ANION GAP SERPL CALCULATED.3IONS-SCNC: 20.3 MMOL/L
APTT PPP: 112.3 SECONDS (ref 22.7–35.4)
APTT PPP: 74.6 SECONDS (ref 22.7–35.4)
APTT PPP: 95.2 SECONDS (ref 22.7–35.4)
BACTERIA SPEC AEROBE CULT: NORMAL
BACTERIA SPEC AEROBE CULT: NORMAL
BASOPHILS # BLD AUTO: 0.02 10*3/MM3 (ref 0–0.2)
BASOPHILS NFR BLD AUTO: 0.1 % (ref 0–1.5)
BH CV LOWER ARTERIAL LEFT 2ND DIGIT SYS MAX: 102 MMHG
BH CV LOWER ARTERIAL LEFT 3RD DIGIT SYS MAX: 99 MMHG
BH CV LOWER ARTERIAL LEFT 4TH DIGIT SYS MAX: 78 MMHG
BH CV LOWER ARTERIAL LEFT 5TH DIGIT SYS MAX: 27 MMHG
BH CV LOWER ARTERIAL LEFT ABI RATIO: 1.18
BH CV LOWER ARTERIAL LEFT DORSALIS PEDIS SYS MAX: 155 MMHG
BH CV LOWER ARTERIAL LEFT GREAT TOE SYS MAX: 121 MMHG
BH CV LOWER ARTERIAL LEFT HIGH THIGH SYS MAX: 157 MMHG
BH CV LOWER ARTERIAL LEFT LOW THIGH SYS MAX: 170 MMHG
BH CV LOWER ARTERIAL LEFT POPLITEAL SYS MAX: 163 MMHG
BH CV LOWER ARTERIAL LEFT POST TIBIAL SYS MAX: 171 MMHG
BH CV LOWER ARTERIAL LEFT TBI RATIO: 0.83
BH CV LOWER ARTERIAL RIGHT 2ND DIGIT SYS MAX: 0 MMHG
BH CV LOWER ARTERIAL RIGHT 3RD DIGIT SYS MAX: 36 MMHG
BH CV LOWER ARTERIAL RIGHT 4TH DIGIT SYS MAX: 0 MMHG
BH CV LOWER ARTERIAL RIGHT 5TH DIGIT SYS MAX: 0 MMHG
BH CV LOWER ARTERIAL RIGHT ABI RATIO: 0.57
BH CV LOWER ARTERIAL RIGHT DORSALIS PEDIS SYS MAX: 75 MMHG
BH CV LOWER ARTERIAL RIGHT GREAT TOE SYS MAX: 0 MMHG
BH CV LOWER ARTERIAL RIGHT HIGH THIGH SYS MAX: 175 MMHG
BH CV LOWER ARTERIAL RIGHT LOW THIGH SYS MAX: 178 MMHG
BH CV LOWER ARTERIAL RIGHT POPLITEAL SYS MAX: 78 MMHG
BH CV LOWER ARTERIAL RIGHT POST TIBIAL SYS MAX: 82 MMHG
BH CV LOWER ARTERIAL RIGHT TBI RATIO: 0
BH CV UPPER ARTERIAL LEFT 1ST DIGIT SYS MAX: 98 MMHG
BH CV UPPER ARTERIAL LEFT 2ND DIGIT SYS MAX: 148 MMHG
BH CV UPPER ARTERIAL LEFT 3RD DIGIT SYS MAX: 134 MMHG
BH CV UPPER ARTERIAL LEFT 4TH DIGIT SYS MAX: 121 MMHG
BH CV UPPER ARTERIAL LEFT 5TH DIGIT SYS MAX: 111 MMHG
BH CV UPPER ARTERIAL LEFT FBI 1ST DIGIT RATIO: 0.69
BH CV UPPER ARTERIAL LEFT FBI 2ND DIGIT RATIO: 1.03
BH CV UPPER ARTERIAL LEFT FBI 3RD DIGIT RATIO: 0.94
BH CV UPPER ARTERIAL LEFT FBI 4TH DIGIT RATIO: 0.85
BH CV UPPER ARTERIAL LEFT FBI 5TH DIGIT RATIO: 0.78
BH CV UPPER ARTERIAL LEFT WBI RATIO: 1.1
BH CV UPPER ARTERIAL RIGHT 1ST DIGIT SYS MAX: 65 MMHG
BH CV UPPER ARTERIAL RIGHT 2ND DIGIT SYS MAX: 74 MMHG
BH CV UPPER ARTERIAL RIGHT 3RD DIGIT SYS MAX: 64 MMHG
BH CV UPPER ARTERIAL RIGHT 4TH DIGIT SYS MAX: 69 MMHG
BH CV UPPER ARTERIAL RIGHT 5TH DIGIT SYS MAX: 76 MMHG
BH CV UPPER ARTERIAL RIGHT FBI 1ST DIGIT RATIO: 0.45
BH CV UPPER ARTERIAL RIGHT FBI 2ND DIGIT RATIO: 0.52
BH CV UPPER ARTERIAL RIGHT FBI 3RD DIGIT RATIO: 0.45
BH CV UPPER ARTERIAL RIGHT FBI 4TH DIGIT RATIO: 0.48
BH CV UPPER ARTERIAL RIGHT FBI 5TH DIGIT RATIO: 0.53
BH CV UPPER ARTERIAL RIGHT WBI RATIO: 1.07
BUN BLD-MCNC: 33 MG/DL (ref 8–23)
BUN BLD-MCNC: 34 MG/DL (ref 8–23)
BUN/CREAT SERPL: 20.2 (ref 7–25)
BUN/CREAT SERPL: 20.5 (ref 7–25)
CALCIUM SPEC-SCNC: 8 MG/DL (ref 8.6–10.5)
CALCIUM SPEC-SCNC: 8.8 MG/DL (ref 8.6–10.5)
CHLORIDE SERPL-SCNC: 103 MMOL/L (ref 98–107)
CHLORIDE SERPL-SCNC: 99 MMOL/L (ref 98–107)
CO2 SERPL-SCNC: 22.7 MMOL/L (ref 22–29)
CO2 SERPL-SCNC: 24.9 MMOL/L (ref 22–29)
CREAT BLD-MCNC: 1.61 MG/DL (ref 0.76–1.27)
CREAT BLD-MCNC: 1.68 MG/DL (ref 0.76–1.27)
DEPRECATED RDW RBC AUTO: 41.8 FL (ref 37–54)
EOSINOPHIL # BLD AUTO: 0 10*3/MM3 (ref 0–0.7)
EOSINOPHIL NFR BLD AUTO: 0 % (ref 0.3–6.2)
ERYTHROCYTE [DISTWIDTH] IN BLOOD BY AUTOMATED COUNT: 12.4 % (ref 11.5–14.5)
GFR SERPL CREATININE-BSD FRML MDRD: 40 ML/MIN/1.73
GFR SERPL CREATININE-BSD FRML MDRD: 42 ML/MIN/1.73
GLUCOSE BLD-MCNC: 106 MG/DL (ref 65–99)
GLUCOSE BLD-MCNC: 116 MG/DL (ref 65–99)
HCT VFR BLD AUTO: 30.5 % (ref 40.4–52.2)
HCT VFR BLD AUTO: 32.4 % (ref 40.4–52.2)
HCT VFR BLD AUTO: 32.9 % (ref 40.4–52.2)
HGB BLD-MCNC: 10.5 G/DL (ref 13.7–17.6)
HGB BLD-MCNC: 10.9 G/DL (ref 13.7–17.6)
HGB BLD-MCNC: 9.9 G/DL (ref 13.7–17.6)
IMM GRANULOCYTES # BLD: 0.17 10*3/MM3 (ref 0–0.03)
IMM GRANULOCYTES NFR BLD: 0.5 % (ref 0–0.5)
LYMPHOCYTES # BLD AUTO: 1.35 10*3/MM3 (ref 0.9–4.8)
LYMPHOCYTES NFR BLD AUTO: 3.9 % (ref 19.6–45.3)
MCH RBC QN AUTO: 30.3 PG (ref 27–32.7)
MCHC RBC AUTO-ENTMCNC: 32.4 G/DL (ref 32.6–36.4)
MCV RBC AUTO: 93.6 FL (ref 79.8–96.2)
MONOCYTES # BLD AUTO: 2.13 10*3/MM3 (ref 0.2–1.2)
MONOCYTES NFR BLD AUTO: 6.1 % (ref 5–12)
NEUTROPHILS # BLD AUTO: 31.52 10*3/MM3 (ref 1.9–8.1)
NEUTROPHILS NFR BLD AUTO: 89.9 % (ref 42.7–76)
PHOSPHATE SERPL-MCNC: 4.1 MG/DL (ref 2.5–4.5)
PLAT MORPH BLD: NORMAL
PLATELET # BLD AUTO: 112 10*3/MM3 (ref 140–500)
PMV BLD AUTO: 9.9 FL (ref 6–12)
POTASSIUM BLD-SCNC: 3.5 MMOL/L (ref 3.5–5.2)
POTASSIUM BLD-SCNC: 3.8 MMOL/L (ref 3.5–5.2)
RBC # BLD AUTO: 3.46 10*6/MM3 (ref 4.6–6)
RBC MORPH BLD: NORMAL
SODIUM BLD-SCNC: 141 MMOL/L (ref 136–145)
SODIUM BLD-SCNC: 142 MMOL/L (ref 136–145)
UPPER ARTERIAL LEFT ARM BRACHIAL SYS MAX: 140 MMHG
UPPER ARTERIAL LEFT ARM BRACHIAL SYS MAX: 145 MMHG
UPPER ARTERIAL LEFT ARM RADIAL SYS MAX: 157 MMHG
UPPER ARTERIAL LEFT ARM ULNAR SYS MAX: 150 MMHG
UPPER ARTERIAL RIGHT ARM BRACHIAL SYS MAX: 143 MMHG
UPPER ARTERIAL RIGHT ARM BRACHIAL SYS MAX: 144 MMHG
UPPER ARTERIAL RIGHT ARM RADIAL SYS MAX: 145 MMHG
UPPER ARTERIAL RIGHT ARM ULNAR SYS MAX: 153 MMHG
URATE SERPL-MCNC: 5.7 MG/DL (ref 3.4–7)
WBC MORPH BLD: NORMAL
WBC NRBC COR # BLD: 35.02 10*3/MM3 (ref 4.5–10.7)

## 2018-04-25 PROCEDURE — 85025 COMPLETE CBC W/AUTO DIFF WBC: CPT | Performed by: HOSPITALIST

## 2018-04-25 PROCEDURE — 25010000002 HYDROMORPHONE PER 4 MG: Performed by: INTERNAL MEDICINE

## 2018-04-25 PROCEDURE — 85730 THROMBOPLASTIN TIME PARTIAL: CPT | Performed by: RADIOLOGY

## 2018-04-25 PROCEDURE — 97110 THERAPEUTIC EXERCISES: CPT

## 2018-04-25 PROCEDURE — 80048 BASIC METABOLIC PNL TOTAL CA: CPT | Performed by: INTERNAL MEDICINE

## 2018-04-25 PROCEDURE — 99223 1ST HOSP IP/OBS HIGH 75: CPT | Performed by: INTERNAL MEDICINE

## 2018-04-25 PROCEDURE — 85007 BL SMEAR W/DIFF WBC COUNT: CPT | Performed by: HOSPITALIST

## 2018-04-25 PROCEDURE — 85730 THROMBOPLASTIN TIME PARTIAL: CPT | Performed by: HOSPITALIST

## 2018-04-25 PROCEDURE — 97162 PT EVAL MOD COMPLEX 30 MIN: CPT

## 2018-04-25 PROCEDURE — 93005 ELECTROCARDIOGRAM TRACING: CPT | Performed by: INTERNAL MEDICINE

## 2018-04-25 PROCEDURE — 93010 ELECTROCARDIOGRAM REPORT: CPT | Performed by: INTERNAL MEDICINE

## 2018-04-25 PROCEDURE — 99232 SBSQ HOSP IP/OBS MODERATE 35: CPT | Performed by: INTERNAL MEDICINE

## 2018-04-25 PROCEDURE — 92610 EVALUATE SWALLOWING FUNCTION: CPT

## 2018-04-25 PROCEDURE — 84550 ASSAY OF BLOOD/URIC ACID: CPT | Performed by: INTERNAL MEDICINE

## 2018-04-25 PROCEDURE — 99232 SBSQ HOSP IP/OBS MODERATE 35: CPT | Performed by: NURSE PRACTITIONER

## 2018-04-25 PROCEDURE — 85018 HEMOGLOBIN: CPT | Performed by: HOSPITALIST

## 2018-04-25 PROCEDURE — 85014 HEMATOCRIT: CPT | Performed by: HOSPITALIST

## 2018-04-25 PROCEDURE — 25010000002 CEFTRIAXONE PER 250 MG: Performed by: INTERNAL MEDICINE

## 2018-04-25 RX ADMIN — LEVOTHYROXINE SODIUM 50 MCG: 50 TABLET ORAL at 09:17

## 2018-04-25 RX ADMIN — HYDROMORPHONE HYDROCHLORIDE 0.5 MG: 1 INJECTION, SOLUTION INTRAMUSCULAR; INTRAVENOUS; SUBCUTANEOUS at 03:53

## 2018-04-25 RX ADMIN — FINASTERIDE 5 MG: 5 TABLET, FILM COATED ORAL at 09:17

## 2018-04-25 RX ADMIN — ATORVASTATIN CALCIUM 20 MG: 20 TABLET, FILM COATED ORAL at 09:17

## 2018-04-25 RX ADMIN — CEFTRIAXONE SODIUM 1 G: 1 INJECTION, SOLUTION INTRAVENOUS at 00:00

## 2018-04-25 RX ADMIN — HYDROCODONE BITARTRATE AND ACETAMINOPHEN 1 TABLET: 5; 325 TABLET ORAL at 14:28

## 2018-04-26 LAB
ALBUMIN SERPL-MCNC: 2.2 G/DL (ref 3.5–5.2)
ANION GAP SERPL CALCULATED.3IONS-SCNC: 9.5 MMOL/L
APTT PPP: 57.6 SECONDS (ref 22.7–35.4)
APTT PPP: 59.6 SECONDS (ref 22.7–35.4)
APTT PPP: 69.2 SECONDS (ref 22.7–35.4)
BASOPHILS # BLD AUTO: 0.02 10*3/MM3 (ref 0–0.2)
BASOPHILS NFR BLD AUTO: 0.1 % (ref 0–1.5)
BUN BLD-MCNC: 33 MG/DL (ref 8–23)
BUN/CREAT SERPL: 20.5 (ref 7–25)
CALCIUM SPEC-SCNC: 8.2 MG/DL (ref 8.6–10.5)
CHLORIDE SERPL-SCNC: 104 MMOL/L (ref 98–107)
CO2 SERPL-SCNC: 24.5 MMOL/L (ref 22–29)
CREAT BLD-MCNC: 1.61 MG/DL (ref 0.76–1.27)
DEPRECATED RDW RBC AUTO: 41.8 FL (ref 37–54)
EOSINOPHIL # BLD AUTO: 0.01 10*3/MM3 (ref 0–0.7)
EOSINOPHIL NFR BLD AUTO: 0 % (ref 0.3–6.2)
ERYTHROCYTE [DISTWIDTH] IN BLOOD BY AUTOMATED COUNT: 12.3 % (ref 11.5–14.5)
GFR SERPL CREATININE-BSD FRML MDRD: 42 ML/MIN/1.73
GLUCOSE BLD-MCNC: 107 MG/DL (ref 65–99)
HCT VFR BLD AUTO: 29.6 % (ref 40.4–52.2)
HCT VFR BLD AUTO: 30.9 % (ref 40.4–52.2)
HCT VFR BLD AUTO: 30.9 % (ref 40.4–52.2)
HGB BLD-MCNC: 10 G/DL (ref 13.7–17.6)
HGB BLD-MCNC: 10.1 G/DL (ref 13.7–17.6)
HGB BLD-MCNC: 9.8 G/DL (ref 13.7–17.6)
IMM GRANULOCYTES # BLD: 0.28 10*3/MM3 (ref 0–0.03)
IMM GRANULOCYTES NFR BLD: 0.9 % (ref 0–0.5)
LYMPHOCYTES # BLD AUTO: 1.44 10*3/MM3 (ref 0.9–4.8)
LYMPHOCYTES NFR BLD AUTO: 4.4 % (ref 19.6–45.3)
MCH RBC QN AUTO: 30.8 PG (ref 27–32.7)
MCHC RBC AUTO-ENTMCNC: 32.7 G/DL (ref 32.6–36.4)
MCV RBC AUTO: 94.2 FL (ref 79.8–96.2)
MONOCYTES # BLD AUTO: 1.36 10*3/MM3 (ref 0.2–1.2)
MONOCYTES NFR BLD AUTO: 4.1 % (ref 5–12)
NEUTROPHILS # BLD AUTO: 29.76 10*3/MM3 (ref 1.9–8.1)
NEUTROPHILS NFR BLD AUTO: 90.5 % (ref 42.7–76)
PHOSPHATE SERPL-MCNC: 2.8 MG/DL (ref 2.5–4.5)
PLATELET # BLD AUTO: 166 10*3/MM3 (ref 140–500)
PMV BLD AUTO: 9.6 FL (ref 6–12)
POTASSIUM BLD-SCNC: 3.4 MMOL/L (ref 3.5–5.2)
PROCALCITONIN SERPL-MCNC: 0.8 NG/ML (ref 0.1–0.25)
RBC # BLD AUTO: 3.28 10*6/MM3 (ref 4.6–6)
SODIUM BLD-SCNC: 138 MMOL/L (ref 136–145)
WBC NRBC COR # BLD: 32.87 10*3/MM3 (ref 4.5–10.7)

## 2018-04-26 PROCEDURE — 99233 SBSQ HOSP IP/OBS HIGH 50: CPT | Performed by: NURSE PRACTITIONER

## 2018-04-26 PROCEDURE — 25010000002 HEPARIN (PORCINE) PER 1000 UNITS: Performed by: HOSPITALIST

## 2018-04-26 PROCEDURE — 80069 RENAL FUNCTION PANEL: CPT | Performed by: INTERNAL MEDICINE

## 2018-04-26 PROCEDURE — 99233 SBSQ HOSP IP/OBS HIGH 50: CPT | Performed by: INTERNAL MEDICINE

## 2018-04-26 PROCEDURE — 97110 THERAPEUTIC EXERCISES: CPT

## 2018-04-26 PROCEDURE — 85025 COMPLETE CBC W/AUTO DIFF WBC: CPT | Performed by: HOSPITALIST

## 2018-04-26 PROCEDURE — 85018 HEMOGLOBIN: CPT | Performed by: HOSPITALIST

## 2018-04-26 PROCEDURE — 94799 UNLISTED PULMONARY SVC/PX: CPT

## 2018-04-26 PROCEDURE — 84145 PROCALCITONIN (PCT): CPT | Performed by: INTERNAL MEDICINE

## 2018-04-26 PROCEDURE — 85730 THROMBOPLASTIN TIME PARTIAL: CPT | Performed by: HOSPITALIST

## 2018-04-26 PROCEDURE — 85014 HEMATOCRIT: CPT | Performed by: HOSPITALIST

## 2018-04-26 RX ORDER — ACETYLCYSTEINE 200 MG/ML
600 SOLUTION ORAL; RESPIRATORY (INHALATION) EVERY 12 HOURS SCHEDULED
Status: COMPLETED | OUTPATIENT
Start: 2018-04-26 | End: 2018-04-27

## 2018-04-26 RX ORDER — POTASSIUM CHLORIDE 750 MG/1
40 CAPSULE, EXTENDED RELEASE ORAL ONCE
Status: COMPLETED | OUTPATIENT
Start: 2018-04-26 | End: 2018-04-26

## 2018-04-26 RX ADMIN — HYDROCODONE BITARTRATE AND ACETAMINOPHEN 1 TABLET: 5; 325 TABLET ORAL at 06:03

## 2018-04-26 RX ADMIN — HEPARIN SODIUM 15 UNITS/KG/HR: 10000 INJECTION, SOLUTION INTRAVENOUS at 16:47

## 2018-04-26 RX ADMIN — POTASSIUM CHLORIDE 40 MEQ: 750 CAPSULE, EXTENDED RELEASE ORAL at 08:43

## 2018-04-26 RX ADMIN — FINASTERIDE 5 MG: 5 TABLET, FILM COATED ORAL at 08:42

## 2018-04-26 RX ADMIN — HEPARIN SODIUM 14 UNITS/KG/HR: 10000 INJECTION, SOLUTION INTRAVENOUS at 09:28

## 2018-04-26 RX ADMIN — LEVOTHYROXINE SODIUM 50 MCG: 50 TABLET ORAL at 08:42

## 2018-04-26 RX ADMIN — ATORVASTATIN CALCIUM 20 MG: 20 TABLET, FILM COATED ORAL at 08:43

## 2018-04-26 RX ADMIN — ACETYLCYSTEINE 600 MG: 200 SOLUTION ORAL; RESPIRATORY (INHALATION) at 15:05

## 2018-04-26 RX ADMIN — ACETYLCYSTEINE 600 MG: 200 SOLUTION ORAL; RESPIRATORY (INHALATION) at 21:32

## 2018-04-26 RX ADMIN — SODIUM CHLORIDE 125 ML/HR: 9 INJECTION, SOLUTION INTRAVENOUS at 02:36

## 2018-04-26 RX ADMIN — SODIUM CHLORIDE 125 ML/HR: 9 INJECTION, SOLUTION INTRAVENOUS at 14:52

## 2018-04-26 RX ADMIN — HEPARIN SODIUM 15 UNITS/KG/HR: 10000 INJECTION, SOLUTION INTRAVENOUS at 19:42

## 2018-04-27 ENCOUNTER — ANESTHESIA EVENT (OUTPATIENT)
Dept: POSTOP/PACU | Facility: HOSPITAL | Age: 78
End: 2018-04-27

## 2018-04-27 ENCOUNTER — APPOINTMENT (OUTPATIENT)
Dept: CT IMAGING | Facility: HOSPITAL | Age: 78
End: 2018-04-27

## 2018-04-27 ENCOUNTER — APPOINTMENT (OUTPATIENT)
Dept: CARDIOLOGY | Facility: HOSPITAL | Age: 78
End: 2018-04-27
Attending: INTERNAL MEDICINE

## 2018-04-27 ENCOUNTER — ANESTHESIA (OUTPATIENT)
Dept: POSTOP/PACU | Facility: HOSPITAL | Age: 78
End: 2018-04-27

## 2018-04-27 ENCOUNTER — APPOINTMENT (OUTPATIENT)
Dept: GENERAL RADIOLOGY | Facility: HOSPITAL | Age: 78
End: 2018-04-27

## 2018-04-27 ENCOUNTER — APPOINTMENT (OUTPATIENT)
Dept: POSTOP/PACU | Facility: HOSPITAL | Age: 78
End: 2018-04-27
Attending: INTERNAL MEDICINE

## 2018-04-27 VITALS — DIASTOLIC BLOOD PRESSURE: 67 MMHG | OXYGEN SATURATION: 100 % | SYSTOLIC BLOOD PRESSURE: 116 MMHG

## 2018-04-27 PROBLEM — E87.0 HYPERNATREMIA: Status: RESOLVED | Noted: 2018-04-27 | Resolved: 2018-04-27

## 2018-04-27 PROBLEM — D69.6 THROMBOCYTOPENIA (HCC): Status: RESOLVED | Noted: 2018-04-24 | Resolved: 2018-04-27

## 2018-04-27 PROBLEM — R31.9 HEMATURIA: Status: RESOLVED | Noted: 2018-04-24 | Resolved: 2018-04-27

## 2018-04-27 PROBLEM — E87.0 HYPERNATREMIA: Status: ACTIVE | Noted: 2018-04-27

## 2018-04-27 PROBLEM — I51.89 CARDIAC MASS: Status: ACTIVE | Noted: 2018-04-27

## 2018-04-27 PROBLEM — N32.89 BLADDER MASS: Status: ACTIVE | Noted: 2018-04-27

## 2018-04-27 LAB
ALBUMIN SERPL-MCNC: 2.2 G/DL (ref 3.5–5.2)
ANION GAP SERPL CALCULATED.3IONS-SCNC: 9.3 MMOL/L
APTT PPP: 56 SECONDS (ref 22.7–35.4)
BASOPHILS # BLD AUTO: 0.01 10*3/MM3 (ref 0–0.2)
BASOPHILS NFR BLD AUTO: 0 % (ref 0–1.5)
BH CV ECHO MEAS - BSA(HAYCOCK): 2 M^2
BH CV ECHO MEAS - BSA: 2 M^2
BH CV ECHO MEAS - BZI_BMI: 24.4 KILOGRAMS/M^2
BH CV ECHO MEAS - BZI_METRIC_HEIGHT: 180.3 CM
BH CV ECHO MEAS - BZI_METRIC_WEIGHT: 79.4 KG
BH CV UPPER VENOUS LEFT AXILLARY AUGMENT: NORMAL
BH CV UPPER VENOUS LEFT AXILLARY COMPETENT: NORMAL
BH CV UPPER VENOUS LEFT AXILLARY COMPRESS: NORMAL
BH CV UPPER VENOUS LEFT AXILLARY PHASIC: NORMAL
BH CV UPPER VENOUS LEFT AXILLARY SPONT: NORMAL
BH CV UPPER VENOUS LEFT BASILIC FOREARM COMPRESS: NORMAL
BH CV UPPER VENOUS LEFT BASILIC UPPER COMPRESS: NORMAL
BH CV UPPER VENOUS LEFT BRACHIAL COMPRESS: NORMAL
BH CV UPPER VENOUS LEFT CEPHALIC FOREARM COMPRESS: NORMAL
BH CV UPPER VENOUS LEFT CEPHALIC UPPER COMPRESS: NORMAL
BH CV UPPER VENOUS LEFT INTERNAL JUGULAR AUGMENT: NORMAL
BH CV UPPER VENOUS LEFT INTERNAL JUGULAR COMPETENT: NORMAL
BH CV UPPER VENOUS LEFT INTERNAL JUGULAR COMPRESS: NORMAL
BH CV UPPER VENOUS LEFT INTERNAL JUGULAR PHASIC: NORMAL
BH CV UPPER VENOUS LEFT INTERNAL JUGULAR SPONT: NORMAL
BH CV UPPER VENOUS LEFT RADIAL COMPRESS: NORMAL
BH CV UPPER VENOUS LEFT SUBCLAVIAN AUGMENT: NORMAL
BH CV UPPER VENOUS LEFT SUBCLAVIAN COMPETENT: NORMAL
BH CV UPPER VENOUS LEFT SUBCLAVIAN COMPRESS: NORMAL
BH CV UPPER VENOUS LEFT SUBCLAVIAN PHASIC: NORMAL
BH CV UPPER VENOUS LEFT SUBCLAVIAN SPONT: NORMAL
BH CV UPPER VENOUS LEFT ULNAR COMPRESS: NORMAL
BH CV UPPER VENOUS RIGHT INTERNAL JUGULAR AUGMENT: NORMAL
BH CV UPPER VENOUS RIGHT INTERNAL JUGULAR COMPETENT: NORMAL
BH CV UPPER VENOUS RIGHT INTERNAL JUGULAR COMPRESS: NORMAL
BH CV UPPER VENOUS RIGHT INTERNAL JUGULAR PHASIC: NORMAL
BH CV UPPER VENOUS RIGHT INTERNAL JUGULAR SPONT: NORMAL
BH CV UPPER VENOUS RIGHT SUBCLAVIAN AUGMENT: NORMAL
BH CV UPPER VENOUS RIGHT SUBCLAVIAN COMPETENT: NORMAL
BH CV UPPER VENOUS RIGHT SUBCLAVIAN COMPRESS: NORMAL
BH CV UPPER VENOUS RIGHT SUBCLAVIAN PHASIC: NORMAL
BH CV UPPER VENOUS RIGHT SUBCLAVIAN SPONT: NORMAL
BH CV VAS BP LEFT ARM: NORMAL MMHG
BUN BLD-MCNC: 28 MG/DL (ref 8–23)
BUN/CREAT SERPL: 20.7 (ref 7–25)
CALCIUM SPEC-SCNC: 7.9 MG/DL (ref 8.6–10.5)
CHLORIDE SERPL-SCNC: 106 MMOL/L (ref 98–107)
CO2 SERPL-SCNC: 22.7 MMOL/L (ref 22–29)
CREAT BLD-MCNC: 1.35 MG/DL (ref 0.76–1.27)
DEPRECATED RDW RBC AUTO: 43 FL (ref 37–54)
DEPRECATED RDW RBC AUTO: 43 FL (ref 37–54)
EOSINOPHIL # BLD AUTO: 0.04 10*3/MM3 (ref 0–0.7)
EOSINOPHIL NFR BLD AUTO: 0.2 % (ref 0.3–6.2)
ERYTHROCYTE [DISTWIDTH] IN BLOOD BY AUTOMATED COUNT: 12.6 % (ref 11.5–14.5)
ERYTHROCYTE [DISTWIDTH] IN BLOOD BY AUTOMATED COUNT: 12.6 % (ref 11.5–14.5)
GFR SERPL CREATININE-BSD FRML MDRD: 52 ML/MIN/1.73
GLUCOSE BLD-MCNC: 109 MG/DL (ref 65–99)
HCT VFR BLD AUTO: 26.8 % (ref 40.4–52.2)
HCT VFR BLD AUTO: 27.1 % (ref 40.4–52.2)
HCT VFR BLD AUTO: 28.1 % (ref 40.4–52.2)
HCT VFR BLD AUTO: 28.1 % (ref 40.4–52.2)
HCT VFR BLD AUTO: 29.2 % (ref 40.4–52.2)
HCT VFR BLD AUTO: 29.2 % (ref 40.4–52.2)
HGB BLD-MCNC: 8.7 G/DL (ref 13.7–17.6)
HGB BLD-MCNC: 8.9 G/DL (ref 13.7–17.6)
HGB BLD-MCNC: 9.4 G/DL (ref 13.7–17.6)
HGB BLD-MCNC: 9.4 G/DL (ref 13.7–17.6)
HGB BLD-MCNC: 9.5 G/DL (ref 13.7–17.6)
HGB BLD-MCNC: 9.5 G/DL (ref 13.7–17.6)
IMM GRANULOCYTES # BLD: 0.19 10*3/MM3 (ref 0–0.03)
IMM GRANULOCYTES NFR BLD: 0.7 % (ref 0–0.5)
LYMPHOCYTES # BLD AUTO: 1.13 10*3/MM3 (ref 0.9–4.8)
LYMPHOCYTES NFR BLD AUTO: 4.2 % (ref 19.6–45.3)
MCH RBC QN AUTO: 30.6 PG (ref 27–32.7)
MCH RBC QN AUTO: 31.4 PG (ref 27–32.7)
MCHC RBC AUTO-ENTMCNC: 32.5 G/DL (ref 32.6–36.4)
MCHC RBC AUTO-ENTMCNC: 33.5 G/DL (ref 32.6–36.4)
MCV RBC AUTO: 94 FL (ref 79.8–96.2)
MCV RBC AUTO: 94.2 FL (ref 79.8–96.2)
MONOCYTES # BLD AUTO: 1.09 10*3/MM3 (ref 0.2–1.2)
MONOCYTES NFR BLD AUTO: 4.1 % (ref 5–12)
NEUTROPHILS # BLD AUTO: 24.38 10*3/MM3 (ref 1.9–8.1)
NEUTROPHILS NFR BLD AUTO: 91.5 % (ref 42.7–76)
PHOSPHATE SERPL-MCNC: 2 MG/DL (ref 2.5–4.5)
PLATELET # BLD AUTO: 222 10*3/MM3 (ref 140–500)
PLATELET # BLD AUTO: 235 10*3/MM3 (ref 140–500)
PMV BLD AUTO: 9.2 FL (ref 6–12)
PMV BLD AUTO: 9.4 FL (ref 6–12)
POTASSIUM BLD-SCNC: 3.5 MMOL/L (ref 3.5–5.2)
RBC # BLD AUTO: 2.99 10*6/MM3 (ref 4.6–6)
RBC # BLD AUTO: 3.1 10*6/MM3 (ref 4.6–6)
SODIUM BLD-SCNC: 138 MMOL/L (ref 136–145)
WBC NRBC COR # BLD: 25.01 10*3/MM3 (ref 4.5–10.7)
WBC NRBC COR # BLD: 26.65 10*3/MM3 (ref 4.5–10.7)

## 2018-04-27 PROCEDURE — 85018 HEMOGLOBIN: CPT | Performed by: HOSPITALIST

## 2018-04-27 PROCEDURE — 0 IOPAMIDOL PER 1 ML: Performed by: HOSPITALIST

## 2018-04-27 PROCEDURE — 71045 X-RAY EXAM CHEST 1 VIEW: CPT

## 2018-04-27 PROCEDURE — 85027 COMPLETE CBC AUTOMATED: CPT | Performed by: INTERNAL MEDICINE

## 2018-04-27 PROCEDURE — 86638 Q FEVER ANTIBODY: CPT | Performed by: INTERNAL MEDICINE

## 2018-04-27 PROCEDURE — 93312 ECHO TRANSESOPHAGEAL: CPT | Performed by: INTERNAL MEDICINE

## 2018-04-27 PROCEDURE — 99232 SBSQ HOSP IP/OBS MODERATE 35: CPT | Performed by: INTERNAL MEDICINE

## 2018-04-27 PROCEDURE — 93320 DOPPLER ECHO COMPLETE: CPT | Performed by: INTERNAL MEDICINE

## 2018-04-27 PROCEDURE — 83520 IMMUNOASSAY QUANT NOS NONAB: CPT | Performed by: INTERNAL MEDICINE

## 2018-04-27 PROCEDURE — 80069 RENAL FUNCTION PANEL: CPT | Performed by: INTERNAL MEDICINE

## 2018-04-27 PROCEDURE — 85670 THROMBIN TIME PLASMA: CPT | Performed by: INTERNAL MEDICINE

## 2018-04-27 PROCEDURE — 85613 RUSSELL VIPER VENOM DILUTED: CPT | Performed by: INTERNAL MEDICINE

## 2018-04-27 PROCEDURE — 93320 DOPPLER ECHO COMPLETE: CPT

## 2018-04-27 PROCEDURE — 94799 UNLISTED PULMONARY SVC/PX: CPT

## 2018-04-27 PROCEDURE — 86147 CARDIOLIPIN ANTIBODY EA IG: CPT | Performed by: INTERNAL MEDICINE

## 2018-04-27 PROCEDURE — 85025 COMPLETE CBC W/AUTO DIFF WBC: CPT | Performed by: HOSPITALIST

## 2018-04-27 PROCEDURE — 85014 HEMATOCRIT: CPT | Performed by: HOSPITALIST

## 2018-04-27 PROCEDURE — 93971 EXTREMITY STUDY: CPT

## 2018-04-27 PROCEDURE — 93325 DOPPLER ECHO COLOR FLOW MAPG: CPT

## 2018-04-27 PROCEDURE — 85705 THROMBOPLASTIN INHIBITION: CPT | Performed by: INTERNAL MEDICINE

## 2018-04-27 PROCEDURE — 87040 BLOOD CULTURE FOR BACTERIA: CPT | Performed by: INTERNAL MEDICINE

## 2018-04-27 PROCEDURE — 74178 CT ABD&PLV WO CNTR FLWD CNTR: CPT

## 2018-04-27 PROCEDURE — 93312 ECHO TRANSESOPHAGEAL: CPT

## 2018-04-27 PROCEDURE — 85732 THROMBOPLASTIN TIME PARTIAL: CPT | Performed by: INTERNAL MEDICINE

## 2018-04-27 PROCEDURE — 92526 ORAL FUNCTION THERAPY: CPT

## 2018-04-27 PROCEDURE — 25010000002 PROPOFOL 10 MG/ML EMULSION: Performed by: ANESTHESIOLOGY

## 2018-04-27 PROCEDURE — 85730 THROMBOPLASTIN TIME PARTIAL: CPT | Performed by: INTERNAL MEDICINE

## 2018-04-27 PROCEDURE — 71275 CT ANGIOGRAPHY CHEST: CPT

## 2018-04-27 PROCEDURE — 86148 ANTI-PHOSPHOLIPID ANTIBODY: CPT | Performed by: INTERNAL MEDICINE

## 2018-04-27 PROCEDURE — 93325 DOPPLER ECHO COLOR FLOW MAPG: CPT | Performed by: INTERNAL MEDICINE

## 2018-04-27 PROCEDURE — 25010000002 HEPARIN (PORCINE) PER 1000 UNITS: Performed by: HOSPITALIST

## 2018-04-27 PROCEDURE — 86146 BETA-2 GLYCOPROTEIN ANTIBODY: CPT | Performed by: INTERNAL MEDICINE

## 2018-04-27 PROCEDURE — 99223 1ST HOSP IP/OBS HIGH 75: CPT | Performed by: INTERNAL MEDICINE

## 2018-04-27 RX ORDER — PROPOFOL 10 MG/ML
VIAL (ML) INTRAVENOUS AS NEEDED
Status: DISCONTINUED | OUTPATIENT
Start: 2018-04-27 | End: 2018-04-27 | Stop reason: SURG

## 2018-04-27 RX ORDER — SODIUM CHLORIDE, SODIUM LACTATE, POTASSIUM CHLORIDE, CALCIUM CHLORIDE 600; 310; 30; 20 MG/100ML; MG/100ML; MG/100ML; MG/100ML
INJECTION, SOLUTION INTRAVENOUS CONTINUOUS PRN
Status: DISCONTINUED | OUTPATIENT
Start: 2018-04-27 | End: 2018-04-27 | Stop reason: SURG

## 2018-04-27 RX ADMIN — FINASTERIDE 5 MG: 5 TABLET, FILM COATED ORAL at 11:04

## 2018-04-27 RX ADMIN — IOPAMIDOL 95 ML: 755 INJECTION, SOLUTION INTRAVENOUS at 20:30

## 2018-04-27 RX ADMIN — LEVOTHYROXINE SODIUM 50 MCG: 50 TABLET ORAL at 11:04

## 2018-04-27 RX ADMIN — ACETYLCYSTEINE 600 MG: 200 SOLUTION ORAL; RESPIRATORY (INHALATION) at 11:03

## 2018-04-27 RX ADMIN — PROPOFOL 160 MG: 10 INJECTION, EMULSION INTRAVENOUS at 07:05

## 2018-04-27 RX ADMIN — SODIUM CHLORIDE 125 ML/HR: 9 INJECTION, SOLUTION INTRAVENOUS at 14:05

## 2018-04-27 RX ADMIN — ATORVASTATIN CALCIUM 20 MG: 20 TABLET, FILM COATED ORAL at 11:04

## 2018-04-27 RX ADMIN — HEPARIN SODIUM 15 UNITS/KG/HR: 10000 INJECTION, SOLUTION INTRAVENOUS at 16:13

## 2018-04-27 RX ADMIN — ACETYLCYSTEINE 600 MG: 200 SOLUTION ORAL; RESPIRATORY (INHALATION) at 21:22

## 2018-04-27 RX ADMIN — SODIUM CHLORIDE, POTASSIUM CHLORIDE, SODIUM LACTATE AND CALCIUM CHLORIDE: 600; 310; 30; 20 INJECTION, SOLUTION INTRAVENOUS at 07:05

## 2018-04-27 NOTE — ANESTHESIA PREPROCEDURE EVALUATION
Anesthesia Evaluation     NPO Solid Status: > 8 hours             Airway   Mallampati: III  TM distance: >3 FB  Neck ROM: full  Dental - normal exam     Pulmonary - normal exam   Cardiovascular - normal exam    (+) hypertension, PVD, hyperlipidemia,       Neuro/Psych  (+) CVA,     GI/Hepatic/Renal/Endo    (+)   renal disease ARF,     Musculoskeletal     Abdominal    Substance History      OB/GYN          Other      history of cancer                    Anesthesia Plan    ASA 3     MAC     Anesthetic plan and risks discussed with patient.

## 2018-04-27 NOTE — ANESTHESIA POSTPROCEDURE EVALUATION
"Patient: Jalen Richards    Procedure Summary     Date:  04/27/18 Room / Location:  [facility] PACU    Anesthesia Start:  0706 Anesthesia Stop:  0721    Procedure:  ADULT TRANSESOPHAGEAL ECHO (SHANNON) W/ CONT IF NECESSARY PER PROTOCOL Diagnosis:  (Endocarditis)    Scheduled Providers:  Vincenzo Oneal MD Provider:  Daniel Jean-Baptiste MD    Anesthesia Type:  MAC ASA Status:  3          Anesthesia Type: MAC  Last vitals  BP   154/86 (04/27/18 0650)   Temp   37.5 °C (99.5 °F) (04/27/18 0650)   Pulse   98 (04/27/18 0650)   Resp   16 (04/27/18 0650)     SpO2   94 % (04/27/18 0650)     Post Anesthesia Care and Evaluation    Patient location during evaluation: bedside  Patient participation: complete - patient participated  Level of consciousness: awake and alert  Pain management: adequate  Airway patency: patent  Anesthetic complications: No anesthetic complications    Cardiovascular status: acceptable  Respiratory status: acceptable  Hydration status: acceptable    Comments: /86 (BP Location: Left arm, Patient Position: Lying)   Pulse 98   Temp 37.5 °C (99.5 °F) (Oral)   Resp 16   Ht 180.3 cm (71\")   Wt 79.4 kg (175 lb)   SpO2 94%   BMI 24.41 kg/m²       "

## 2018-04-28 PROBLEM — N28.0: Status: ACTIVE | Noted: 2018-04-28

## 2018-04-28 LAB
ALBUMIN SERPL-MCNC: 2 G/DL (ref 3.5–5.2)
ANION GAP SERPL CALCULATED.3IONS-SCNC: 11.4 MMOL/L
APTT PPP: 60 SECONDS (ref 22.7–35.4)
APTT PPP: 60.2 SECONDS (ref 22.7–35.4)
APTT PPP: 94.9 SECONDS (ref 22.7–35.4)
BASOPHILS # BLD AUTO: 0.03 10*3/MM3 (ref 0–0.2)
BASOPHILS NFR BLD AUTO: 0.2 % (ref 0–1.5)
BUN BLD-MCNC: 22 MG/DL (ref 8–23)
BUN/CREAT SERPL: 18.2 (ref 7–25)
CALCIUM SPEC-SCNC: 8 MG/DL (ref 8.6–10.5)
CARDIOLIPIN IGG SER IA-ACNC: <9 GPL U/ML (ref 0–14)
CARDIOLIPIN IGM SER IA-ACNC: <9 MPL U/ML (ref 0–12)
CHLORIDE SERPL-SCNC: 105 MMOL/L (ref 98–107)
CO2 SERPL-SCNC: 20.6 MMOL/L (ref 22–29)
CREAT BLD-MCNC: 1.21 MG/DL (ref 0.76–1.27)
DEPRECATED RDW RBC AUTO: 43.2 FL (ref 37–54)
EOSINOPHIL # BLD AUTO: 0.11 10*3/MM3 (ref 0–0.7)
EOSINOPHIL NFR BLD AUTO: 0.7 % (ref 0.3–6.2)
ERYTHROCYTE [DISTWIDTH] IN BLOOD BY AUTOMATED COUNT: 12.5 % (ref 11.5–14.5)
GFR SERPL CREATININE-BSD FRML MDRD: 58 ML/MIN/1.73
GLUCOSE BLD-MCNC: 100 MG/DL (ref 65–99)
HCT VFR BLD AUTO: 25.8 % (ref 40.4–52.2)
HCT VFR BLD AUTO: 28.6 % (ref 40.4–52.2)
HCT VFR BLD AUTO: 28.7 % (ref 40.4–52.2)
HGB BLD-MCNC: 8.5 G/DL (ref 13.7–17.6)
HGB BLD-MCNC: 9 G/DL (ref 13.7–17.6)
HGB BLD-MCNC: 9.3 G/DL (ref 13.7–17.6)
IMM GRANULOCYTES # BLD: 0.21 10*3/MM3 (ref 0–0.03)
IMM GRANULOCYTES NFR BLD: 1.3 % (ref 0–0.5)
LYMPHOCYTES # BLD AUTO: 0.46 10*3/MM3 (ref 0.9–4.8)
LYMPHOCYTES NFR BLD AUTO: 2.9 % (ref 19.6–45.3)
MCH RBC QN AUTO: 30.9 PG (ref 27–32.7)
MCHC RBC AUTO-ENTMCNC: 32.5 G/DL (ref 32.6–36.4)
MCV RBC AUTO: 95 FL (ref 79.8–96.2)
MONOCYTES # BLD AUTO: 1.19 10*3/MM3 (ref 0.2–1.2)
MONOCYTES NFR BLD AUTO: 7.5 % (ref 5–12)
NEUTROPHILS # BLD AUTO: 14.02 10*3/MM3 (ref 1.9–8.1)
NEUTROPHILS NFR BLD AUTO: 88.7 % (ref 42.7–76)
PHOSPHATE SERPL-MCNC: 2.2 MG/DL (ref 2.5–4.5)
PLATELET # BLD AUTO: 264 10*3/MM3 (ref 140–500)
PMV BLD AUTO: 8.8 FL (ref 6–12)
POTASSIUM BLD-SCNC: 3.4 MMOL/L (ref 3.5–5.2)
PROCALCITONIN SERPL-MCNC: 0.35 NG/ML (ref 0.1–0.25)
RBC # BLD AUTO: 3.01 10*6/MM3 (ref 4.6–6)
SODIUM BLD-SCNC: 137 MMOL/L (ref 136–145)
WBC NRBC COR # BLD: 15.81 10*3/MM3 (ref 4.5–10.7)

## 2018-04-28 PROCEDURE — 25010000002 HEPARIN (PORCINE) PER 1000 UNITS: Performed by: HOSPITALIST

## 2018-04-28 PROCEDURE — 85025 COMPLETE CBC W/AUTO DIFF WBC: CPT | Performed by: HOSPITALIST

## 2018-04-28 PROCEDURE — 80069 RENAL FUNCTION PANEL: CPT | Performed by: INTERNAL MEDICINE

## 2018-04-28 PROCEDURE — 85018 HEMOGLOBIN: CPT | Performed by: HOSPITALIST

## 2018-04-28 PROCEDURE — 87040 BLOOD CULTURE FOR BACTERIA: CPT | Performed by: INTERNAL MEDICINE

## 2018-04-28 PROCEDURE — 99233 SBSQ HOSP IP/OBS HIGH 50: CPT | Performed by: NURSE PRACTITIONER

## 2018-04-28 PROCEDURE — 85730 THROMBOPLASTIN TIME PARTIAL: CPT | Performed by: INTERNAL MEDICINE

## 2018-04-28 PROCEDURE — 99232 SBSQ HOSP IP/OBS MODERATE 35: CPT | Performed by: INTERNAL MEDICINE

## 2018-04-28 PROCEDURE — 99231 SBSQ HOSP IP/OBS SF/LOW 25: CPT | Performed by: INTERNAL MEDICINE

## 2018-04-28 PROCEDURE — 85730 THROMBOPLASTIN TIME PARTIAL: CPT | Performed by: SURGERY

## 2018-04-28 PROCEDURE — 85014 HEMATOCRIT: CPT | Performed by: HOSPITALIST

## 2018-04-28 PROCEDURE — 84145 PROCALCITONIN (PCT): CPT | Performed by: INTERNAL MEDICINE

## 2018-04-28 RX ADMIN — LEVOTHYROXINE SODIUM 50 MCG: 50 TABLET ORAL at 09:05

## 2018-04-28 RX ADMIN — FINASTERIDE 5 MG: 5 TABLET, FILM COATED ORAL at 09:05

## 2018-04-28 RX ADMIN — HYDROCODONE BITARTRATE AND ACETAMINOPHEN 1 TABLET: 5; 325 TABLET ORAL at 03:33

## 2018-04-28 RX ADMIN — HEPARIN SODIUM 17 UNITS/KG/HR: 10000 INJECTION, SOLUTION INTRAVENOUS at 15:31

## 2018-04-28 RX ADMIN — ATORVASTATIN CALCIUM 20 MG: 20 TABLET, FILM COATED ORAL at 09:05

## 2018-04-29 LAB
ALBUMIN SERPL-MCNC: 2.2 G/DL (ref 3.5–5.2)
ANION GAP SERPL CALCULATED.3IONS-SCNC: 13.2 MMOL/L
APTT PPP: 57.8 SECONDS (ref 22.7–35.4)
APTT PPP: 66 SECONDS (ref 22.7–35.4)
B2 GLYCOPROT1 IGA SER-ACNC: <9 GPI IGA UNITS (ref 0–25)
B2 GLYCOPROT1 IGG SER-ACNC: <9 GPI IGG UNITS (ref 0–20)
B2 GLYCOPROT1 IGM SER-ACNC: <9 GPI IGM UNITS (ref 0–32)
BACTERIA SPEC AEROBE CULT: NORMAL
BACTERIA SPEC AEROBE CULT: NORMAL
BASOPHILS # BLD AUTO: 0.03 10*3/MM3 (ref 0–0.2)
BASOPHILS NFR BLD AUTO: 0.2 % (ref 0–1.5)
BUN BLD-MCNC: 20 MG/DL (ref 8–23)
BUN/CREAT SERPL: 16.1 (ref 7–25)
CALCIUM SPEC-SCNC: 7.9 MG/DL (ref 8.6–10.5)
CHLORIDE SERPL-SCNC: 103 MMOL/L (ref 98–107)
CO2 SERPL-SCNC: 22.8 MMOL/L (ref 22–29)
CREAT BLD-MCNC: 1.24 MG/DL (ref 0.76–1.27)
DEPRECATED RDW RBC AUTO: 42.4 FL (ref 37–54)
EOSINOPHIL # BLD AUTO: 0.2 10*3/MM3 (ref 0–0.7)
EOSINOPHIL NFR BLD AUTO: 1.6 % (ref 0.3–6.2)
ERYTHROCYTE [DISTWIDTH] IN BLOOD BY AUTOMATED COUNT: 12.5 % (ref 11.5–14.5)
GFR SERPL CREATININE-BSD FRML MDRD: 57 ML/MIN/1.73
GLUCOSE BLD-MCNC: 94 MG/DL (ref 65–99)
HCT VFR BLD AUTO: 26.9 % (ref 40.4–52.2)
HCT VFR BLD AUTO: 26.9 % (ref 40.4–52.2)
HGB BLD-MCNC: 9 G/DL (ref 13.7–17.6)
HGB BLD-MCNC: 9 G/DL (ref 13.7–17.6)
IMM GRANULOCYTES # BLD: 0.27 10*3/MM3 (ref 0–0.03)
IMM GRANULOCYTES NFR BLD: 2.1 % (ref 0–0.5)
LA NT DPL PPP: 40.1 SEC (ref 0–55)
LA NT DPL/LA NT HPL PPP-RTO: 0.87 RATIO (ref 0–1.4)
LA NT PLATELET PPP: 50.8 SEC (ref 0–51.9)
LUPUS ANTICOAGULANT REFLEX: ABNORMAL
LYMPHOCYTES # BLD AUTO: 0.44 10*3/MM3 (ref 0.9–4.8)
LYMPHOCYTES NFR BLD AUTO: 3.5 % (ref 19.6–45.3)
MCH RBC QN AUTO: 31 PG (ref 27–32.7)
MCHC RBC AUTO-ENTMCNC: 33.5 G/DL (ref 32.6–36.4)
MCV RBC AUTO: 92.8 FL (ref 79.8–96.2)
MONOCYTES # BLD AUTO: 0.92 10*3/MM3 (ref 0.2–1.2)
MONOCYTES NFR BLD AUTO: 7.3 % (ref 5–12)
NEUTROPHILS # BLD AUTO: 11.04 10*3/MM3 (ref 1.9–8.1)
NEUTROPHILS NFR BLD AUTO: 87.4 % (ref 42.7–76)
PHOSPHATE SERPL-MCNC: 3.1 MG/DL (ref 2.5–4.5)
PLATELET # BLD AUTO: 330 10*3/MM3 (ref 140–500)
PMV BLD AUTO: 9.5 FL (ref 6–12)
POTASSIUM BLD-SCNC: 3.5 MMOL/L (ref 3.5–5.2)
RBC # BLD AUTO: 2.9 10*6/MM3 (ref 4.6–6)
SCREEN DRVVT: 46.8 SEC (ref 0–47)
SODIUM BLD-SCNC: 139 MMOL/L (ref 136–145)
THROMBIN NEUTRALIZATION: 13.6 SEC (ref 0–23)
THROMBIN TIME MIX: >150 SEC (ref 0–23)
THROMBIN TIME: >150 SEC (ref 0–23)
WBC NRBC COR # BLD: 12.63 10*3/MM3 (ref 4.5–10.7)

## 2018-04-29 PROCEDURE — 99232 SBSQ HOSP IP/OBS MODERATE 35: CPT | Performed by: NURSE PRACTITIONER

## 2018-04-29 PROCEDURE — 94799 UNLISTED PULMONARY SVC/PX: CPT

## 2018-04-29 PROCEDURE — 85014 HEMATOCRIT: CPT | Performed by: HOSPITALIST

## 2018-04-29 PROCEDURE — 99233 SBSQ HOSP IP/OBS HIGH 50: CPT | Performed by: INTERNAL MEDICINE

## 2018-04-29 PROCEDURE — 97110 THERAPEUTIC EXERCISES: CPT

## 2018-04-29 PROCEDURE — 80069 RENAL FUNCTION PANEL: CPT | Performed by: INTERNAL MEDICINE

## 2018-04-29 PROCEDURE — 85730 THROMBOPLASTIN TIME PARTIAL: CPT | Performed by: INTERNAL MEDICINE

## 2018-04-29 PROCEDURE — 85730 THROMBOPLASTIN TIME PARTIAL: CPT | Performed by: RADIOLOGY

## 2018-04-29 PROCEDURE — 85018 HEMOGLOBIN: CPT | Performed by: HOSPITALIST

## 2018-04-29 PROCEDURE — 99231 SBSQ HOSP IP/OBS SF/LOW 25: CPT | Performed by: INTERNAL MEDICINE

## 2018-04-29 PROCEDURE — 25010000002 HEPARIN (PORCINE) PER 1000 UNITS: Performed by: HOSPITALIST

## 2018-04-29 PROCEDURE — 85025 COMPLETE CBC W/AUTO DIFF WBC: CPT | Performed by: HOSPITALIST

## 2018-04-29 RX ORDER — POTASSIUM CHLORIDE 1.5 G/1.77G
40 POWDER, FOR SOLUTION ORAL AS NEEDED
Status: DISCONTINUED | OUTPATIENT
Start: 2018-04-29 | End: 2018-05-01 | Stop reason: HOSPADM

## 2018-04-29 RX ORDER — POTASSIUM CHLORIDE 750 MG/1
40 CAPSULE, EXTENDED RELEASE ORAL AS NEEDED
Status: DISCONTINUED | OUTPATIENT
Start: 2018-04-29 | End: 2018-05-01 | Stop reason: HOSPADM

## 2018-04-29 RX ADMIN — ATORVASTATIN CALCIUM 20 MG: 20 TABLET, FILM COATED ORAL at 08:10

## 2018-04-29 RX ADMIN — POTASSIUM CHLORIDE 40 MEQ: 750 CAPSULE, EXTENDED RELEASE ORAL at 10:51

## 2018-04-29 RX ADMIN — HEPARIN SODIUM 17 UNITS/KG/HR: 10000 INJECTION, SOLUTION INTRAVENOUS at 14:05

## 2018-04-29 RX ADMIN — FINASTERIDE 5 MG: 5 TABLET, FILM COATED ORAL at 08:10

## 2018-04-29 RX ADMIN — LEVOTHYROXINE SODIUM 50 MCG: 50 TABLET ORAL at 08:10

## 2018-04-30 LAB
ALBUMIN SERPL-MCNC: 2.1 G/DL (ref 3.5–5.2)
ANION GAP SERPL CALCULATED.3IONS-SCNC: 7.1 MMOL/L
APTT PPP: 81.4 SECONDS (ref 22.7–35.4)
BASOPHILS # BLD AUTO: 0.03 10*3/MM3 (ref 0–0.2)
BASOPHILS NFR BLD AUTO: 0.2 % (ref 0–1.5)
BUN BLD-MCNC: 18 MG/DL (ref 8–23)
BUN/CREAT SERPL: 15 (ref 7–25)
CALCIUM SPEC-SCNC: 8.1 MG/DL (ref 8.6–10.5)
CHLORIDE SERPL-SCNC: 104 MMOL/L (ref 98–107)
CO2 SERPL-SCNC: 26.9 MMOL/L (ref 22–29)
CREAT BLD-MCNC: 1.2 MG/DL (ref 0.76–1.27)
DEPRECATED RDW RBC AUTO: 42.3 FL (ref 37–54)
EOSINOPHIL # BLD AUTO: 0.22 10*3/MM3 (ref 0–0.7)
EOSINOPHIL NFR BLD AUTO: 1.6 % (ref 0.3–6.2)
ERYTHROCYTE [DISTWIDTH] IN BLOOD BY AUTOMATED COUNT: 12.5 % (ref 11.5–14.5)
FERRITIN SERPL-MCNC: 507.2 NG/ML (ref 30–400)
FOLATE SERPL-MCNC: 8.45 NG/ML (ref 4.78–24.2)
GFR SERPL CREATININE-BSD FRML MDRD: 59 ML/MIN/1.73
GLUCOSE BLD-MCNC: 98 MG/DL (ref 65–99)
HCT VFR BLD AUTO: 26.5 % (ref 40.4–52.2)
HGB BLD-MCNC: 8.8 G/DL (ref 13.7–17.6)
HGB RETIC QN: 34.6 PG (ref 32.7–38.6)
IMM GRANULOCYTES # BLD: 0.51 10*3/MM3 (ref 0–0.03)
IMM GRANULOCYTES NFR BLD: 3.8 % (ref 0–0.5)
IMM RETICS NFR: 24.3 % (ref 0.7–13.7)
IRON 24H UR-MRATE: 24 MCG/DL (ref 59–158)
IRON SATN MFR SERPL: 14 % (ref 20–50)
LYMPHOCYTES # BLD AUTO: 0.46 10*3/MM3 (ref 0.9–4.8)
LYMPHOCYTES NFR BLD AUTO: 3.4 % (ref 19.6–45.3)
MAGNESIUM SERPL-MCNC: 2.4 MG/DL (ref 1.6–2.4)
MCH RBC QN AUTO: 30.8 PG (ref 27–32.7)
MCHC RBC AUTO-ENTMCNC: 33.2 G/DL (ref 32.6–36.4)
MCV RBC AUTO: 92.7 FL (ref 79.8–96.2)
MONOCYTES # BLD AUTO: 1.15 10*3/MM3 (ref 0.2–1.2)
MONOCYTES NFR BLD AUTO: 8.6 % (ref 5–12)
NEUTROPHILS # BLD AUTO: 11.54 10*3/MM3 (ref 1.9–8.1)
NEUTROPHILS NFR BLD AUTO: 86.2 % (ref 42.7–76)
PHOSPHATE SERPL-MCNC: 2.8 MG/DL (ref 2.5–4.5)
PLATELET # BLD AUTO: 369 10*3/MM3 (ref 140–500)
PMV BLD AUTO: 8.8 FL (ref 6–12)
POTASSIUM BLD-SCNC: 3.4 MMOL/L (ref 3.5–5.2)
RBC # BLD AUTO: 2.86 10*6/MM3 (ref 4.6–6)
RETICS/RBC NFR AUTO: 1.56 % (ref 0.5–1.5)
SODIUM BLD-SCNC: 138 MMOL/L (ref 136–145)
TIBC SERPL-MCNC: 171 MCG/DL
TRANSFERRIN SERPL-MCNC: 115 MG/DL (ref 200–360)
WBC NRBC COR # BLD: 13.4 10*3/MM3 (ref 4.5–10.7)

## 2018-04-30 PROCEDURE — 85046 RETICYTE/HGB CONCENTRATE: CPT | Performed by: INTERNAL MEDICINE

## 2018-04-30 PROCEDURE — 99233 SBSQ HOSP IP/OBS HIGH 50: CPT | Performed by: INTERNAL MEDICINE

## 2018-04-30 PROCEDURE — 94799 UNLISTED PULMONARY SVC/PX: CPT

## 2018-04-30 PROCEDURE — 83540 ASSAY OF IRON: CPT | Performed by: INTERNAL MEDICINE

## 2018-04-30 PROCEDURE — 83735 ASSAY OF MAGNESIUM: CPT | Performed by: INTERNAL MEDICINE

## 2018-04-30 PROCEDURE — 92523 SPEECH SOUND LANG COMPREHEN: CPT

## 2018-04-30 PROCEDURE — 82728 ASSAY OF FERRITIN: CPT | Performed by: INTERNAL MEDICINE

## 2018-04-30 PROCEDURE — 97110 THERAPEUTIC EXERCISES: CPT

## 2018-04-30 PROCEDURE — 85025 COMPLETE CBC W/AUTO DIFF WBC: CPT | Performed by: HOSPITALIST

## 2018-04-30 PROCEDURE — 85730 THROMBOPLASTIN TIME PARTIAL: CPT | Performed by: SURGERY

## 2018-04-30 PROCEDURE — 25010000002 HEPARIN (PORCINE) PER 1000 UNITS: Performed by: HOSPITALIST

## 2018-04-30 PROCEDURE — 99232 SBSQ HOSP IP/OBS MODERATE 35: CPT | Performed by: NURSE PRACTITIONER

## 2018-04-30 PROCEDURE — 80069 RENAL FUNCTION PANEL: CPT | Performed by: INTERNAL MEDICINE

## 2018-04-30 PROCEDURE — 82746 ASSAY OF FOLIC ACID SERUM: CPT | Performed by: INTERNAL MEDICINE

## 2018-04-30 PROCEDURE — 84466 ASSAY OF TRANSFERRIN: CPT | Performed by: INTERNAL MEDICINE

## 2018-04-30 PROCEDURE — 99231 SBSQ HOSP IP/OBS SF/LOW 25: CPT | Performed by: INTERNAL MEDICINE

## 2018-04-30 RX ORDER — ATORVASTATIN CALCIUM 20 MG/1
20 TABLET, FILM COATED ORAL NIGHTLY
Status: DISCONTINUED | OUTPATIENT
Start: 2018-04-30 | End: 2018-05-01

## 2018-04-30 RX ORDER — LEVOTHYROXINE SODIUM 0.05 MG/1
50 TABLET ORAL
Status: DISCONTINUED | OUTPATIENT
Start: 2018-05-01 | End: 2018-05-01 | Stop reason: HOSPADM

## 2018-04-30 RX ADMIN — FINASTERIDE 5 MG: 5 TABLET, FILM COATED ORAL at 08:34

## 2018-04-30 RX ADMIN — ATORVASTATIN CALCIUM 20 MG: 20 TABLET, FILM COATED ORAL at 20:29

## 2018-04-30 RX ADMIN — POTASSIUM CHLORIDE 40 MEQ: 750 CAPSULE, EXTENDED RELEASE ORAL at 08:34

## 2018-04-30 RX ADMIN — HYDROCODONE BITARTRATE AND ACETAMINOPHEN 1 TABLET: 5; 325 TABLET ORAL at 20:30

## 2018-04-30 RX ADMIN — POTASSIUM CHLORIDE 40 MEQ: 750 CAPSULE, EXTENDED RELEASE ORAL at 13:27

## 2018-04-30 RX ADMIN — HEPARIN SODIUM 17 UNITS/KG/HR: 10000 INJECTION, SOLUTION INTRAVENOUS at 10:47

## 2018-04-30 RX ADMIN — HYDROCODONE BITARTRATE AND ACETAMINOPHEN 1 TABLET: 5; 325 TABLET ORAL at 10:00

## 2018-05-01 ENCOUNTER — APPOINTMENT (OUTPATIENT)
Dept: CARDIOLOGY | Facility: HOSPITAL | Age: 78
End: 2018-05-01
Attending: INTERNAL MEDICINE

## 2018-05-01 VITALS
HEIGHT: 71 IN | BODY MASS INDEX: 25.55 KG/M2 | RESPIRATION RATE: 18 BRPM | OXYGEN SATURATION: 95 % | WEIGHT: 182.5 LBS | DIASTOLIC BLOOD PRESSURE: 79 MMHG | HEART RATE: 71 BPM | SYSTOLIC BLOOD PRESSURE: 149 MMHG | TEMPERATURE: 98.6 F

## 2018-05-01 LAB
ALBUMIN SERPL-MCNC: 2.2 G/DL (ref 3.5–5.2)
ANION GAP SERPL CALCULATED.3IONS-SCNC: 9.5 MMOL/L
APTT PPP: 94.8 SECONDS (ref 22.7–35.4)
BASOPHILS # BLD AUTO: 0.04 10*3/MM3 (ref 0–0.2)
BASOPHILS NFR BLD AUTO: 0.3 % (ref 0–1.5)
BH CV ECHO MEAS - BSA(HAYCOCK): 2 M^2
BH CV ECHO MEAS - BSA: 2 M^2
BH CV ECHO MEAS - BZI_BMI: 25.4 KILOGRAMS/M^2
BH CV ECHO MEAS - BZI_METRIC_HEIGHT: 180.3 CM
BH CV ECHO MEAS - BZI_METRIC_WEIGHT: 82.6 KG
BH CV VAS BP RIGHT ARM: NORMAL MMHG
BUN BLD-MCNC: 15 MG/DL (ref 8–23)
BUN/CREAT SERPL: 12.5 (ref 7–25)
CALCIUM SPEC-SCNC: 8.3 MG/DL (ref 8.6–10.5)
CHLORIDE SERPL-SCNC: 104 MMOL/L (ref 98–107)
CO2 SERPL-SCNC: 25.5 MMOL/L (ref 22–29)
CREAT BLD-MCNC: 1.2 MG/DL (ref 0.76–1.27)
DEPRECATED RDW RBC AUTO: 41.9 FL (ref 37–54)
EOSINOPHIL # BLD AUTO: 0.27 10*3/MM3 (ref 0–0.7)
EOSINOPHIL NFR BLD AUTO: 1.8 % (ref 0.3–6.2)
ERYTHROCYTE [DISTWIDTH] IN BLOOD BY AUTOMATED COUNT: 12.3 % (ref 11.5–14.5)
GFR SERPL CREATININE-BSD FRML MDRD: 59 ML/MIN/1.73
GLUCOSE BLD-MCNC: 96 MG/DL (ref 65–99)
HCT VFR BLD AUTO: 27.4 % (ref 40.4–52.2)
HGB BLD-MCNC: 8.9 G/DL (ref 13.7–17.6)
IMM GRANULOCYTES # BLD: 0.59 10*3/MM3 (ref 0–0.03)
IMM GRANULOCYTES NFR BLD: 4 % (ref 0–0.5)
LYMPHOCYTES # BLD AUTO: 0.48 10*3/MM3 (ref 0.9–4.8)
LYMPHOCYTES NFR BLD AUTO: 3.3 % (ref 19.6–45.3)
MAXIMAL PREDICTED HEART RATE: 145 BPM
MCH RBC QN AUTO: 30.5 PG (ref 27–32.7)
MCHC RBC AUTO-ENTMCNC: 32.5 G/DL (ref 32.6–36.4)
MCV RBC AUTO: 93.8 FL (ref 79.8–96.2)
MONOCYTES # BLD AUTO: 1.21 10*3/MM3 (ref 0.2–1.2)
MONOCYTES NFR BLD AUTO: 8.3 % (ref 5–12)
NEUTROPHILS # BLD AUTO: 12.02 10*3/MM3 (ref 1.9–8.1)
NEUTROPHILS NFR BLD AUTO: 82.3 % (ref 42.7–76)
PHOSPHATE SERPL-MCNC: 3 MG/DL (ref 2.5–4.5)
PLATELET # BLD AUTO: 363 10*3/MM3 (ref 140–500)
PMV BLD AUTO: 9.1 FL (ref 6–12)
POTASSIUM BLD-SCNC: 3.9 MMOL/L (ref 3.5–5.2)
PS IGG SER-ACNC: 3 GPS IGG (ref 0–11)
PS IGM SER-ACNC: 7 MPS IGM (ref 0–25)
RBC # BLD AUTO: 2.92 10*6/MM3 (ref 4.6–6)
SODIUM BLD-SCNC: 139 MMOL/L (ref 136–145)
STRESS TARGET HR: 123 BPM
WBC NRBC COR # BLD: 14.61 10*3/MM3 (ref 4.5–10.7)

## 2018-05-01 PROCEDURE — 85025 COMPLETE CBC W/AUTO DIFF WBC: CPT | Performed by: HOSPITALIST

## 2018-05-01 PROCEDURE — 93325 DOPPLER ECHO COLOR FLOW MAPG: CPT | Performed by: INTERNAL MEDICINE

## 2018-05-01 PROCEDURE — 93321 DOPPLER ECHO F-UP/LMTD STD: CPT

## 2018-05-01 PROCEDURE — 93308 TTE F-UP OR LMTD: CPT

## 2018-05-01 PROCEDURE — 85730 THROMBOPLASTIN TIME PARTIAL: CPT | Performed by: SURGERY

## 2018-05-01 PROCEDURE — 80069 RENAL FUNCTION PANEL: CPT | Performed by: INTERNAL MEDICINE

## 2018-05-01 PROCEDURE — 93308 TTE F-UP OR LMTD: CPT | Performed by: INTERNAL MEDICINE

## 2018-05-01 PROCEDURE — 99232 SBSQ HOSP IP/OBS MODERATE 35: CPT | Performed by: INTERNAL MEDICINE

## 2018-05-01 PROCEDURE — 99232 SBSQ HOSP IP/OBS MODERATE 35: CPT | Performed by: NURSE PRACTITIONER

## 2018-05-01 PROCEDURE — 25010000002 ENOXAPARIN PER 10 MG: Performed by: HOSPITALIST

## 2018-05-01 PROCEDURE — 97110 THERAPEUTIC EXERCISES: CPT

## 2018-05-01 PROCEDURE — 93321 DOPPLER ECHO F-UP/LMTD STD: CPT | Performed by: INTERNAL MEDICINE

## 2018-05-01 PROCEDURE — 25010000002 HEPARIN (PORCINE) PER 1000 UNITS: Performed by: HOSPITALIST

## 2018-05-01 PROCEDURE — 93325 DOPPLER ECHO COLOR FLOW MAPG: CPT

## 2018-05-01 PROCEDURE — 99231 SBSQ HOSP IP/OBS SF/LOW 25: CPT | Performed by: NURSE PRACTITIONER

## 2018-05-01 RX ORDER — ATORVASTATIN CALCIUM 20 MG/1
40 TABLET, FILM COATED ORAL NIGHTLY
Status: DISCONTINUED | OUTPATIENT
Start: 2018-05-01 | End: 2018-05-01 | Stop reason: HOSPADM

## 2018-05-01 RX ORDER — AMLODIPINE BESYLATE 10 MG/1
5 TABLET ORAL
Qty: 30 TABLET | Refills: 0
Start: 2018-05-01

## 2018-05-01 RX ORDER — ACETAMINOPHEN 325 MG/1
650 TABLET ORAL EVERY 4 HOURS PRN
Start: 2018-05-01

## 2018-05-01 RX ADMIN — ENOXAPARIN SODIUM 80 MG: 80 INJECTION SUBCUTANEOUS at 17:11

## 2018-05-01 RX ADMIN — HYDROCODONE BITARTRATE AND ACETAMINOPHEN 1 TABLET: 5; 325 TABLET ORAL at 08:54

## 2018-05-01 RX ADMIN — FINASTERIDE 5 MG: 5 TABLET, FILM COATED ORAL at 08:54

## 2018-05-01 RX ADMIN — HEPARIN SODIUM 15 UNITS/KG/HR: 10000 INJECTION, SOLUTION INTRAVENOUS at 06:31

## 2018-05-01 RX ADMIN — LEVOTHYROXINE SODIUM 50 MCG: 50 TABLET ORAL at 06:35

## 2018-05-01 NOTE — SIGNIFICANT NOTE
05/01/18 1617   Rehab Time/Intention   Evaluation Not Performed other (see comments)  (RN, PT noted pt was being d/c to skilled faciulity. Recommend OT eval at new facility)   Rehab Treatment   Discipline occupational therapist

## 2018-05-01 NOTE — PROGRESS NOTES
"    Patient Name: Jalen Richards  :1940  77 y.o.      Patient Care Team:  Geovany Saba MD as PCP - General (Emergency Medicine)    Chief Complaint: follow up     Interval History: He feels pretty good today. Making good but slow progress. Plans to go to rehab at [place].        Objective   Vital Signs  Temp:  [98.5 °F (36.9 °C)-98.7 °F (37.1 °C)] 98.6 °F (37 °C)  Heart Rate:  [70-79] 70  Resp:  [18] 18  BP: (134-154)/(69-80) 134/69  No intake or output data in the 24 hours ending 18 1119  Flowsheet Rows    Flowsheet Row First Filed Value   Admission Height 180.3 cm (71\") Documented at 2018   Admission Weight 80.3 kg (177 lb) Documented at 2018          Physical Exam:   General Appearance:    Alert, cooperative, in no acute distress   Lungs:     Clear to auscultation.  Normal respiratory effort and rate.      Heart:    Regular rhythm and normal rate, normal S1 and S2, no murmurs, gallops or rubs.     Chest Wall:    No abnormalities observed   Abdomen:     Soft, nontender, positive bowel sounds.     Extremities:   + cyanosis right big toe, left fifth toe, right fifth finger, no clubbing or edema.  No marked joint deformities.  Adequate musculoskeletal strength.       Results Review:      Results from last 7 days  Lab Units 18  0443   SODIUM mmol/L 139   POTASSIUM mmol/L 3.9   CHLORIDE mmol/L 104   CO2 mmol/L 25.5   BUN mg/dL 15   CREATININE mg/dL 1.20   GLUCOSE mg/dL 96   CALCIUM mg/dL 8.3*       Results from last 7 days  Lab Units 18  1627 18  1153   CK TOTAL U/L  --  108   TROPONIN T ng/mL 0.515* 0.475*       Results from last 7 days  Lab Units 18  0443   WBC 10*3/mm3 14.61*   HEMOGLOBIN g/dL 8.9*   HEMATOCRIT % 27.4*   PLATELETS 10*3/mm3 363       Results from last 7 days  Lab Units 18  0443 18  0600 18  1056  18  1627   INR   --   --   --   --  1.19*   APTT seconds 94.8* 81.4* 66.0*  < > 33.2   < > = values in this interval " not displayed.    Results from last 7 days  Lab Units 04/30/18  0600   MAGNESIUM mg/dL 2.4                   Medication Review:     atorvastatin 20 mg Oral Nightly   enoxaparin 80 mg Subcutaneous Q12H   finasteride 5 mg Oral Daily   levothyroxine 50 mcg Oral Q AM             Assessment/Plan   1.  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  Rapid progression of valvular lesions is likely to be the cause.      2.  Hypertension -- generally stable.     3.  CHASE -- improved.  CT shows renal infarctions.     4.  Anorexia -- improving slightly.     6.  Bladder mass -- unable to perform TURBT due to bleeding risk on anticoagulation.     Lovenox has been ordered per hematology. Plan for several weeks with this, and possibly transitioning to oral anticoagulation as an outpatient. OK to discharge to rehab at any time from a cardiac standpoint. He has an appointment with Dr. Renteria on 5/23 which he should keep.     TJ Garcia  [facility]  05/01/18  11:19 AM

## 2018-05-01 NOTE — PLAN OF CARE
Problem: Patient Care Overview  Goal: Plan of Care Review  Outcome: Ongoing (interventions implemented as appropriate)   05/01/18 5610   Coping/Psychosocial   Plan of Care Reviewed With patient   Plan of Care Review   Progress no change   OTHER   Outcome Summary Pt was alert & oriented x4, medicated as ordered & tolerated well, pt's oxygen dropped to 87-89 % while asleep, 2L of oxygen per n/c was replaced as ordered, Resp therapist aware, vss , family at bedside all night, Heparin drip in progress, awaiting PTT in am, I will continue to monitor.     Goal: Individualization and Mutuality  Outcome: Ongoing (interventions implemented as appropriate)    Goal: Discharge Needs Assessment  Outcome: Ongoing (interventions implemented as appropriate)    Goal: Interprofessional Rounds/Family Conf  Outcome: Ongoing (interventions implemented as appropriate)      Problem: Fall Risk (Adult)  Goal: Absence of Fall  Outcome: Ongoing (interventions implemented as appropriate)      Problem: Skin Injury Risk (Adult)  Goal: Skin Health and Integrity  Outcome: Ongoing (interventions implemented as appropriate)      Problem: Activity Intolerance (Adult)  Goal: Activity Tolerance  Outcome: Ongoing (interventions implemented as appropriate)    Goal: Effective Energy Conservation Techniques  Outcome: Ongoing (interventions implemented as appropriate)      Problem: Stroke (Ischemic) (Adult)  Goal: Signs and Symptoms of Listed Potential Problems Will be Absent, Minimized or Managed (Stroke)  Outcome: Ongoing (interventions implemented as appropriate)

## 2018-05-01 NOTE — THERAPY TREATMENT NOTE
Acute Care - Physical Therapy Treatment Note   [facility]     Patient Name: Jalen Richards  : 1940  MRN: [id number]  Today's Date: 2018  Onset of Illness/Injury or Date of Surgery: 18  Date of Referral to PT: 18  Referring Physician: Kena    Admit Date: 2018    Visit Dx:    ICD-10-CM ICD-9-CM   1. Generalized weakness R53.1 780.79   2. Leukocytosis, unspecified type D72.829 288.60   3. Hematuria, unspecified type R31.9 599.70   4. Renal insufficiency N28.9 593.9   5. Elevated troponin R74.8 790.6   6. Acute midline low back pain without sciatica M54.5 724.2   7. Impaired functional mobility, balance, and endurance Z74.09 V49.89     Patient Active Problem List   Diagnosis   • Basilar artery occlusion with cerebral infarction   • Generalized weakness   • Hypertension   • CHASE (acute kidney injury)   • Pyelonephritis   • BPH (benign prostatic hyperplasia)   • Toe cyanosis   • Proteinuria   • Peripheral artery embolism or thrombosis   • Cardiac mass   • Bladder mass   • Thromboembolism of renal arteries       Therapy Treatment          Rehabilitation Treatment Summary     Row Name 18 1555             Treatment Time/Intention    Discipline physical therapist  -PC      Document Type therapy note (daily note)  -PC      Subjective Information complains of;weakness;fatigue  -PC      Mode of Treatment physical therapy  -PC      Patient/Family Observations pt is in bed, no aacute distress  -PC      Care Plan Review patient/other agree to care plan  -PC      Therapy Frequency (PT Clinical Impression) daily  -PC      Patient Effort good  -PC      Comment increased processing time required but pt more consistent  -PC      Existing Precautions/Restrictions fall  -PC      Recorded by [PC] Harriett Jimenez, PT 18 1603 18 1603      Row Name 18 1556             Cognitive Assessment/Intervention- PT/OT    Orientation Status (Cognition) oriented x 3  -PC      Follows Commands  (Cognition) follows one step commands;75-90% accuracy;delayed response/completion;increased processing time needed;repetition of directions required;verbal cues/prompting required  -PC      Safety Deficit (Cognitive) mild deficit;insight into deficits/self awareness  -PC      Personal Safety Interventions fall prevention program maintained;gait belt  -PC      Recorded by [PC] Harriett Jimenez, PT 05/01/18 1603 05/01/18 1603      Row Name 05/01/18 1555             Bed Mobility Assessment/Treatment    Supine-Sit [place] (Bed Mobility) minimum assist (75% patient effort)  -PC      Sit-Supine [place] (Bed Mobility) not tested   pt in chair  -PC      Comment (Bed Mobility) posterior lean, tactile and verbal cues required  -PC      Recorded by [PC] Harriett Jimenez, PT 05/01/18 1603 05/01/18 1603      Row Name 05/01/18 1555             Transfer Assessment/Treatment    Sit-Stand Tate (Transfers) minimum assist (75% patient effort);moderate assist (50% patient effort);2 person assist;verbal cues  -PC      Stand-Sit Tate (Transfers) minimum assist (75% patient effort);2 person assist;verbal cues  -PC      Recorded by [PC] Harriett Jimenez, PT 05/01/18 1603 05/01/18 1603      Row Name 05/01/18 1555             Sit-Stand Transfer    Assistive Device (Sit-Stand Transfers) walker, front-wheeled  -PC      Recorded by [PC] Harriett Jimenez, PT 05/01/18 1603 05/01/18 1603      Row Name 05/01/18 1555             Gait/Stairs Assessment/Training    Tate Level (Gait) 2 person assist;minimum assist (75% patient effort)  -      Assistive Device (Gait) walker, front-wheeled  -PC      Distance in Feet (Gait) 50 ft. chair pulled behind pt, seated rest then pt was able to walk again, 50 ft further  -PC      Pattern (Gait) step-through  -PC      Deviations/Abnormal Patterns (Gait) ataxic;base of support, wide;veronica decreased;stride length decreased;gait speed decreased  -      Bilateral Gait Deviations forward  flexed posture  -PC      Comment (Gait/Stairs) tactile cues required for weight shift and balance  -PC      Recorded by [PC] Harriett Jimenez, PT 05/01/18 1603 05/01/18 1603      Row Name 05/01/18 1555             Therapeutic Exercise    Lower Extremity (Therapeutic Exercise) LAQ (long arc quad), bilateral  -PC      Lower Extremity Range of Motion (Therapeutic Exercise) ankle dorsiflexion/plantar flexion, bilateral  -PC      Comment (Therapeutic Exercise) B shoulder flex/ext  -PC      Recorded by [PC] Harriett Jimenez, PT 05/01/18 1603 05/01/18 1603      Row Name 05/01/18 1555             Static Sitting Balance    Level of [place] (Unsupported Sitting, Static Balance) minimal assist, 75% patient effort;contact guard assist  -PC      Sitting Position (Unsupported Sitting, Static Balance) sitting on edge of bed  -PC      Time Able to Maintain Position (Unsupported Sitting, Static Balance) more than 5 minutes  -PC      Comment (Unsupported Sitting, Static Balance) worked on sitting in midline, reducing posterior lean  -PC      Recorded by [PC] Harriett Jimenez, PT 05/01/18 1603 05/01/18 1603      Row Name 05/01/18 1555             Positioning and Restraints    Pre-Treatment Position in bed  -PC      Post Treatment Position chair  -PC      In Chair reclined;call light within reach;encouraged to call for assist;with family/caregiver  -PC      Recorded by [PC] Harriett Jimenez, PT 05/01/18 1603 05/01/18 1603      Row Name 05/01/18 1555             Pain Scale: Numbers Pre/Post-Treatment    Pain Scale: Numbers, Pretreatment 0/10 - no pain  -PC      Recorded by [PC] Harriett Jimenez, PT 05/01/18 1603 05/01/18 1603      Row Name 05/01/18 1555             Outcome Summary/Treatment Plan (PT)    Anticipated Discharge Disposition (PT) inpatient rehab facility;skilled nursing facility (SNF)  -PC      Recorded by [PC] Harriett Jimenez, PT 05/01/18 1603 05/01/18 1603        User Key  (r) = Recorded By, (t) = Taken By, (c) = Cosigned By     Initials Name Effective Dates Discipline     Harriett Jimenez, PT 04/03/18 -  PT                     Physical Therapy Education     Title: PT OT SLP Therapies (Active)     Topic: Physical Therapy (Active)     Point: Mobility training (Active)    Learning Progress Summary     Learner Status Readiness Method Response Comment Documented by    Patient Active Acceptance E,D NR  PC 05/01/18 1603     Active Acceptance E,D NR   04/30/18 1356     Done Acceptance E VU  ME 04/29/18 1823     Done Acceptance E,D VU,NR  EJ 04/29/18 1447     Active Acceptance E NR  EM 04/26/18 0929     Done Acceptance E,D VU,DU,NR  RE 04/25/18 0926    Family Done Acceptance E,D VU,DU,NR  RE 04/25/18 0926          Point: Home exercise program (Active)    Learning Progress Summary     Learner Status Readiness Method Response Comment Documented by    Patient Active Acceptance E,D NR  PC 05/01/18 1603     Active Acceptance E,D NR   04/30/18 1356     Done Acceptance E VU  ME 04/29/18 1823     Done Acceptance E,D VU,DU,NR  RE 04/25/18 0926    Family Done Acceptance E,D VU,DU,NR  RE 04/25/18 0926          Point: Body mechanics (Active)    Learning Progress Summary     Learner Status Readiness Method Response Comment Documented by    Patient Active Acceptance E,D NR  PC 05/01/18 1603     Active Acceptance E,D NR  JM 04/30/18 1356     Done Acceptance E VU  ME 04/29/18 1823     Done Acceptance E,D VU,DU,NR  RE 04/25/18 0926    Family Done Acceptance E,D VU,DU,NR  RE 04/25/18 0926          Point: Precautions (Active)    Learning Progress Summary     Learner Status Readiness Method Response Comment Documented by    Patient Active Acceptance E,D NR  PC 05/01/18 1603     Active Acceptance E,D NR   04/30/18 1356     Done Acceptance E VU  ME 04/29/18 1823     Done Acceptance E,D VU,DU,NR  RE 04/25/18 0926    Family Done Acceptance E,D VU,DU,NR  RE 04/25/18 0926                      User Key     Initials Effective Dates Name Provider Type Discipline      04/03/18 -  Harriett Jimenez, PT Physical Therapist PT    EM 04/03/18 -  Ashly Callaway, PT Physical Therapist PT    SHREYA 03/07/18 -  Lisandra Milian PTA Physical Therapy Assistant PT    EJ 04/03/18 -  Rosa Pinto, PT Physical Therapist PT    ME 04/14/17 -  Rita Urena, RN Registered Nurse Nurse    RE 02/05/18 -  Flavio Estrada, PT Student PT Student PT                    PT Recommendation and Plan  Anticipated Discharge Disposition (PT): inpatient rehab facility, skilled nursing facility (SNF)  Therapy Frequency (PT Clinical Impression): daily  Outcome Summary/Treatment Plan (PT)  Anticipated Discharge Disposition (PT): inpatient rehab facility, skilled nursing facility (SNF)  Plan of Care Reviewed With: patient  Progress: improving  Outcome Summary: pt showing steady improvement in functional mobility, cont to have balance deficits, and delayed processing with activity but following commands more consistently, good participation          Outcome Measures     Row Name 05/01/18 1600 04/30/18 1300 04/29/18 1400       How much help from another person do you currently need...    Turning from your back to your side while in flat bed without using bedrails? 3  -PC 2  -JM 2  -EJ    Moving from lying on back to sitting on the side of a flat bed without bedrails? 3  -PC 2  -JM 2  -EJ    Moving to and from a bed to a chair (including a wheelchair)? 2  -PC 2  -JM 2  -EJ    Standing up from a chair using your arms (e.g., wheelchair, bedside chair)? 2  -PC 2  -JM 2  -EJ    Climbing 3-5 steps with a railing? 1  -PC 1  -JM 1  -EJ    To walk in hospital room? 2  -PC 2  -JM 1  -EJ    AM-PAC 6 Clicks Score 13  -PC 11  -JM 10  -EJ       Functional Assessment    Outcome Measure Options  --  -- AM-PAC 6 Clicks Basic Mobility (PT)  -EJ      User Key  (r) = Recorded By, (t) = Taken By, (c) = Cosigned By    Initials Name Provider Type    PC Harriett Jimenez, PT Physical Therapist    JM Lisandra Milian, PTA Physical Therapy  Assistant    TK Pinto, PT Physical Therapist           Time Calculation:         PT Charges     Row Name 05/01/18 1605             Time Calculation    Start Time 1543  -PC      Stop Time 1600  -PC      Time Calculation (min) 17 min  -PC      PT Received On 05/01/18  -PC      PT - Next Appointment 05/02/18  -PC        User Key  (r) = Recorded By, (t) = Taken By, (c) = Cosigned By    Initials Name Provider Type    PC Harriett Jimenez, PT Physical Therapist          Therapy Charges for Today     Code Description Service Date Service Provider Modifiers Qty    91639039970 HC PT THER PROC EA 15 MIN 5/1/2018 Harriett Jimenez, PT GP 1          PT G-Codes  Outcome Measure Options: AM-PAC 6 Clicks Basic Mobility (PT)    Harriett Jimenez PT  5/1/2018

## 2018-05-01 NOTE — PROGRESS NOTES
REASON FOR FOLLOWUP/CHIEF COMPLAINT:  Thrombotic endocarditis with multiple peripheral emboli, anemia    HISTORY OF PRESENT ILLNESS:   The patient seems to be doing okay today.  He walked a few steps with physical therapy yesterday but remains weak and will likely need subacute rehabilitation placement.    Past Medical History, Past Surgical History, Social History, Family History have been reviewed and are without significant changes except as mentioned.    Review of Systems   Review of Systems   Constitutional: Negative for activity change.   HENT: Negative for nosebleeds and trouble swallowing.    Respiratory: Negative for shortness of breath and wheezing.    Cardiovascular: Negative for chest pain and palpitations.   Gastrointestinal: Negative for constipation, diarrhea and nausea.   Genitourinary: Negative for dysuria and hematuria.   Musculoskeletal: Negative for arthralgias and myalgias.   Neurological: Positive for weakness. Negative for seizures and syncope.   Hematological: Negative for adenopathy. Does not bruise/bleed easily.   Psychiatric/Behavioral: Negative for confusion.       Medications:  The current medication list was reviewed in the EMR    ALLERGIES:  No Known Allergies           Vitals:    04/30/18 1900 04/30/18 2300 05/01/18 0637 05/01/18 0703   BP: 149/75 154/80  134/69   BP Location: Left arm Left arm  Right arm   Patient Position: Lying Lying  Lying   Pulse: 79   70   Resp: 18 18  18   Temp: 98.7 °F (37.1 °C) 98.5 °F (36.9 °C)  98.6 °F (37 °C)   TempSrc: Oral Oral  Oral   SpO2:  92%  95%   Weight:   82.8 kg (182 lb 8 oz)    Height:         Physical Exam    CONSTITUTIONAL:  Vital signs reviewed.  No distress, looks comfortable.  EYES:  Conjunctiva and lids unremarkable.  PERRLA  EARS,NOSE,MOUTH,THROAT:  Ears and nose appear unremarkable. He has poor dentition, multiple broken teeth   RESPIRATORY:  Normal respiratory effort.  Lungs clear to auscultation bilaterally.  CARDIOVASCULAR:   Normal S1, S2.  No murmurs rubs or gallops.  No significant lower extremity edema.  GASTROINTESTINAL: Abdomen appears unremarkable.  Nontender.  No hepatomegaly.  No splenomegaly.  NEURO:   No focal deficits, mild general weakness  MUSCULOSKELETAL:  No edema  SKIN:  Warm.   Ischemic changes bilateral fingertips noted, no worsening noted.  PSYCHIATRIC:  Mild decreased memory         RECENT LABS:  WBC   Date Value Ref Range Status   05/01/2018 14.61 (H) 4.50 - 10.70 10*3/mm3 Final   04/30/2018 13.40 (H) 4.50 - 10.70 10*3/mm3 Final   04/29/2018 12.63 (H) 4.50 - 10.70 10*3/mm3 Final     Hemoglobin   Date Value Ref Range Status   05/01/2018 8.9 (L) 13.7 - 17.6 g/dL Final   04/30/2018 8.8 (L) 13.7 - 17.6 g/dL Final   04/29/2018 9.0 (L) 13.7 - 17.6 g/dL Final   04/29/2018 9.0 (L) 13.7 - 17.6 g/dL Final   04/28/2018 9.0 (L) 13.7 - 17.6 g/dL Final   04/28/2018 8.5 (L) 13.7 - 17.6 g/dL Final     Platelets   Date Value Ref Range Status   05/01/2018 363 140 - 500 10*3/mm3 Final   04/30/2018 369 140 - 500 10*3/mm3 Final   04/29/2018 330 140 - 500 10*3/mm3 Final                 ASSESSMENT/PLAN:  1.  Peripheral artery embolization including embolic stroke, renal emboli, digital ischemia secondary to mitral valve and tricuspid valve vegetations, working diagnosis nonbacterial thrombotic endocarditis.  The patient is currently on unfractionated heparin drip.  I discussed his case with Dr. Renteria.  The patient was previously on Eliquis and had rapid growth of valve lesions despite being on Eliquis.  There is some concern that the patient could have bladder cancer and a hypercoagulable state of malignancy although no tissue diagnosis has been made.  His hypocoagulable evaluation is negative for lupus anticoagulant, cardiolipin antibodies and beta-2 glycoprotein 1; antiphosphatidylserine antibodies are pending.    I think the best choice of anticoagulation for the patient would be Lovenox 1 mg/kg every 12 hours at least temporarily for a  few weeks until there is improvement in the valve lesions and then perhaps he could be switched back to an oral anticoagulant.  Cost of one month Lovenox for the patient will be $14.  I discussed the plan with the patient his wife and daughter.  They were agreeable to Lovenox injections at home, so I will go ahead and discontinue unfractionated heparin and switched to Lovenox today 1 mg/kg every 12 hours      2.  Diffuse bladder wall thickening on CT 4/27/18.  Retroperitoneal lymphadenopathy (mild) of unclear significance.  TURBT not currently planned secondary to bleeding risk.  We could consider a PET scan outpatient although I am doubtful insurance would cover in the absence of a histologic diagnosis      3.  Prostate cancer.  On active surveillance.    4.  Anemia.  Hb stable at 8.9.  B12 and folic acid unremarkable.  Iron studies consistent with chronic disease/inflammation    5.  Leukocytosis.  WBC decreasing and is near normal; most likely inflammatory/reactive

## 2018-05-01 NOTE — PROGRESS NOTES
"   LOS: 8 days   Patient Care Team:  Geovany Saba MD as PCP - General (Emergency Medicine)    Chief Complaint/ Reason for encounter: Acute renal failure  Chief Complaint   Patient presents with   • Weakness - Generalized     stroke 1 week ago, recommended yto come into ED by Neuro Dr. Gordon   • Back Pain     lower back pain         Subjective     Weakness - Generalized   Associated symptoms include weakness. Pertinent negatives include no chest pain.   Back Pain   Associated symptoms include weakness. Pertinent negatives include no chest pain.       Subjective:  Symptoms:  Improved.  He reports weakness.  No shortness of breath or chest pain.  (No new complaints,   He feels well, off heparin drip, now on subcutaneous Lovenox  No further bleeding noted).    Diet:  Adequate intake.    Activity level: Impaired due to weakness.    Pain:  He reports no pain.          History taken from: Patient and chart    Objective     Vital Signs  Temp:  [98.5 °F (36.9 °C)-98.7 °F (37.1 °C)] 98.6 °F (37 °C)  Heart Rate:  [70-79] 71  Resp:  [18] 18  BP: (134-154)/(69-80) 149/79       Wt Readings from Last 1 Encounters:   05/01/18 0637 82.8 kg (182 lb 8 oz)   04/30/18 0538 81.1 kg (178 lb 12.8 oz)   04/28/18 0556 83 kg (182 lb 15.7 oz)   04/26/18 0534 79.4 kg (175 lb)   04/25/18 0500 76.9 kg (169 lb 9.6 oz)   04/24/18 0429 72.9 kg (160 lb 12.8 oz)   04/23/18 2002 80.3 kg (177 lb)       Objective:  General Appearance:  Comfortable, in no acute distress and not in pain.    Vital signs: (most recent): Blood pressure 149/79, pulse 71, temperature 98.6 °F (37 °C), temperature source Oral, resp. rate 18, height 180.3 cm (71\"), weight 82.8 kg (182 lb 8 oz), SpO2 95 %.  Vital signs are normal.    Output: Producing urine.    HEENT: Normal HEENT exam.    Lungs:  Normal effort and normal respiratory rate.  Breath sounds clear to auscultation.  He is not in respiratory distress.  No decreased breath sounds.    Heart: Normal rate.  " Regular rhythm.    Abdomen: Abdomen is soft.  Bowel sounds are normal.   There is no abdominal tenderness.     Extremities: Normal range of motion.  There is no deformity or dependent edema.    Pulses: Distal pulses are intact.    Neurological: Patient is alert and oriented to person, place and time.    Skin:  Warm and dry.  No rash or cyanosis.             Results Review:    Past Medical History: reviewed  Past Medical History:   Diagnosis Date   • Cancer     3-4 years ago lymph node radiation    • Disease of thyroid gland    • Hyperlipidemia    • Hypertension    • Prostate cancer 2015    no treatment. observation by Dr Yarbrough   • Prostatic hypertrophy     followed by Dr Yarbrough   • Stroke          Allergies:  No Known Allergies    Intake/Output:   No intake or output data in the 24 hours ending 05/01/18 5045      DATA:  Radiology and Labs:  The following labs independently reviewed by me.  Interval notes, chart personally reviewed by me.   Old records independently reviewed showing No chronic kidney disease, baseline creatinine 1.0    Labs:   Recent Results (from the past 24 hour(s))   Renal Function Panel    Collection Time: 05/01/18  4:43 AM   Result Value Ref Range    Glucose 96 65 - 99 mg/dL    BUN 15 8 - 23 mg/dL    Creatinine 1.20 0.76 - 1.27 mg/dL    Sodium 139 136 - 145 mmol/L    Potassium 3.9 3.5 - 5.2 mmol/L    Chloride 104 98 - 107 mmol/L    CO2 25.5 22.0 - 29.0 mmol/L    Calcium 8.3 (L) 8.6 - 10.5 mg/dL    Albumin 2.20 (L) 3.50 - 5.20 g/dL    Phosphorus 3.0 2.5 - 4.5 mg/dL    Anion Gap 9.5 mmol/L    BUN/Creatinine Ratio 12.5 7.0 - 25.0    eGFR Non African Amer 59 (L) >60 mL/min/1.73   aPTT    Collection Time: 05/01/18  4:43 AM   Result Value Ref Range    PTT 94.8 (H) 22.7 - 35.4 seconds   CBC Auto Differential    Collection Time: 05/01/18  4:43 AM   Result Value Ref Range    WBC 14.61 (H) 4.50 - 10.70 10*3/mm3    RBC 2.92 (L) 4.60 - 6.00 10*6/mm3    Hemoglobin 8.9 (L) 13.7 - 17.6 g/dL    Hematocrit  27.4 (L) 40.4 - 52.2 %    MCV 93.8 79.8 - 96.2 fL    MCH 30.5 27.0 - 32.7 pg    MCHC 32.5 (L) 32.6 - 36.4 g/dL    RDW 12.3 11.5 - 14.5 %    RDW-SD 41.9 37.0 - 54.0 fl    MPV 9.1 6.0 - 12.0 fL    Platelets 363 140 - 500 10*3/mm3    Neutrophil % 82.3 (H) 42.7 - 76.0 %    Lymphocyte % 3.3 (L) 19.6 - 45.3 %    Monocyte % 8.3 5.0 - 12.0 %    Eosinophil % 1.8 0.3 - 6.2 %    Basophil % 0.3 0.0 - 1.5 %    Immature Grans % 4.0 (H) 0.0 - 0.5 %    Neutrophils, Absolute 12.02 (H) 1.90 - 8.10 10*3/mm3    Lymphocytes, Absolute 0.48 (L) 0.90 - 4.80 10*3/mm3    Monocytes, Absolute 1.21 (H) 0.20 - 1.20 10*3/mm3    Eosinophils, Absolute 0.27 0.00 - 0.70 10*3/mm3    Basophils, Absolute 0.04 0.00 - 0.20 10*3/mm3    Immature Grans, Absolute 0.59 (H) 0.00 - 0.03 10*3/mm3   Adult Transthoracic Echo Limited W/ Cont if Necessary Per Protocol    Collection Time: 05/01/18  1:43 PM   Result Value Ref Range    BSA 2.0 m^2     CV ECHO SUE - BZI_BMI 25.4 kilograms/m^2     CV ECHO SUE - BSA(HAYCOCK) 2.0 m^2     CV ECHO SUE - BZI_METRIC_WEIGHT 82.6 kg     CV ECHO SUE - BZI_METRIC_HEIGHT 180.3 cm    Target HR (85%) 123 bpm    Max. Pred. HR (100%) 145 bpm     CV VAS BP RIGHT /79 mmHg       Radiology:  Imaging Results (last 24 hours)     ** No results found for the last 24 hours. **             Medications have been reviewed:  Current Facility-Administered Medications   Medication Dose Route Frequency Provider Last Rate Last Dose   • acetaminophen (TYLENOL) tablet 650 mg  650 mg Oral Q4H PRN Maged Wang MD       • atorvastatin (LIPITOR) tablet 40 mg  40 mg Oral Nightly TJ Hdez       • enoxaparin (LOVENOX) syringe 80 mg  80 mg Subcutaneous Q12H Randy Rivas MD       • finasteride (PROSCAR) tablet 5 mg  5 mg Oral Daily Maged Wang MD   5 mg at 05/01/18 0854   • HYDROcodone-acetaminophen (NORCO) 5-325 MG per tablet 1 tablet  1 tablet Oral Q4H PRN Maged Wang MD   1 tablet at 05/01/18 0854   •  HYDROmorphone (DILAUDID) injection 0.5 mg  0.5 mg Intravenous Q2H PRN Maged Wang MD   0.5 mg at 04/25/18 0353    And   • naloxone (NARCAN) injection 0.4 mg  0.4 mg Intravenous Q5 Min PRN Maged Wang MD       • levothyroxine (SYNTHROID, LEVOTHROID) tablet 50 mcg  50 mcg Oral Q AM Randy Rivas MD   50 mcg at 05/01/18 0635   • nitroglycerin (NITROSTAT) SL tablet 0.4 mg  0.4 mg Sublingual Q5 Min PRN Maged Wang MD       • ondansetron (ZOFRAN) tablet 4 mg  4 mg Oral Q6H PRN Maged Wang MD        Or   • ondansetron ODT (ZOFRAN-ODT) disintegrating tablet 4 mg  4 mg Oral Q6H PRN Maged Wang MD        Or   • ondansetron (ZOFRAN) injection 4 mg  4 mg Intravenous Q6H PRN Maged Wang MD       • potassium chloride (KLOR-CON) packet 40 mEq  40 mEq Oral PRN Jero Ley MD       • potassium chloride (MICRO-K) CR capsule 40 mEq  40 mEq Oral PRN Jero Ley MD   40 mEq at 04/30/18 1327   • sodium chloride 0.9 % flush 1-10 mL  1-10 mL Intravenous PRN Maged Wang MD       • sodium chloride 0.9 % flush 10 mL  10 mL Intravenous PRN Triage Protocol Emergency, MD             Assessment & Plan  acute renal failure,  Secondary to renal emboli, Improved, this may be his new baseline  proteinuria, likely secondary to hypertension  hypokalemia, replace orally as needed per protocol  hematuria,  cystoscopy performed showing sessile bladder mass   weakness  Recent CVA, on a heparin drip  Chronic hypertension  thrombocytopenia, stable, monitor  BPH  Renal thromboemboli     Plan:   His renal function is improved, Extensive renal embolic disease seen on CT  Continue heparin, Eventual transition to oral agent  Monitor off IV fluids, Continue to hold ACE inhibitor and hydrochlorothiazide  Replace potassium per protocol    Continue to monitor renal function, electroytes and volume closely   Please call me with any questions or concerns    Jero Ley MD  [facility]  Office phone number: [phone]  Answering service phone number: [phone]    05/01/18  3:55 PM      Dictation performed using Dragon dictation software

## 2018-05-01 NOTE — DISCHARGE SUMMARY
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] 04/28/2018   • Bladder mass [N32.89] 04/27/2018   • Proteinuria [R80.9] 04/25/2018   • Peripheral artery embolism or thrombosis [I74.4] 04/25/2018   • Hypertension [I10] 04/24/2018   • CHASE (acute kidney injury) [N17.9] 04/24/2018   • BPH (benign prostatic hyperplasia) [N40.0] 04/24/2018   • Toe cyanosis [R23.0] 04/24/2018   • Generalized weakness [R53.1] 04/23/2018   • 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] 04/12/2018      Resolved Hospital Problems    Diagnosis Date Noted Date Resolved   • Hypernatremia [E87.0] 04/27/2018 04/27/2018   • Hematuria [R31.9] 04/24/2018 04/27/2018   • Thrombocytopenia [D69.6] 04/24/2018 04/27/2018   • Raynaud's disease without gangrene [I73.00] 04/24/2018 04/25/2018     Procedures Performed       Consults     Date and Time Order Name Status Description    4/27/2018 0858 Hematology & Oncology Inpatient Consult Completed     4/24/2018 1758 Inpatient Infectious Diseases Consult Completed     4/24/2018 1606 Inpatient Vascular Surgery Consult Completed     4/24/2018 1139 Inpatient Neurology Consult      4/24/2018 1139 Inpatient Nephrology Consult Completed     4/24/2018 0354 Inpatient Urology Consult      4/24/2018 0354 Inpatient Cardiology Consult Completed     4/23/2018 2320 LHA (on-call MD unless specified) Completed     4/12/2018 1456 Inpatient Cardiology Consult Completed     4/12/2018 1059 Inpatient Consult to Neurology (on call physician/group) Completed     4/12/2018 1059 Consult Interventional Neurologist and/or Stroke Team Completed         Hospital Course  Please see history and physical for details. Patient is a 77 y.o. male has quite  extensive diagnosis' found on this admission which I will elaborate on further but otherwise is very nancy clinically as he is pretty much asymptomatic from multiple of the above processes.  To summarize patient had previous abnormal vegetation on a heart valve found by SHANNON.  Patient was placed on Eliquis and discharged and within a very short period of time repeat echocardiogram demonstrated significant increase in size.  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  Leukocytosis has been persistent though felt to be more reactive consistent with chronic disease and inflammation as opposed to active infection.  This patient pretty much has minimal deficits from this extensive process other than generalized weakness in which he is a 2 person assist and will need additional rehabilitation.  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  Additional hypercoagulable studies are still pending and hematology will follow those up upon outpatient follow-up.  I agree with the above plan as he has been transitioned off a heparin drip to Lovenox twice daily and this is going to be pursued for the next month or 2.  Only if echocardiogram demonstrates improvement will hematology then consider transition to Coumadin but otherwise will not resume Eliquis as that was felt to be failure.  Urology has followed for bladder wall changes with retroperitoneal lymphadenopathy and a past history of prostate cancer.  Urology did not feel this was metastatic and they've have plans for eventual TURBT.  Hemoglobin has remained stable despite anticoagulation with an improving hemoglobin to 8.9 by the  time of discharge.  Multiple blood pressure medications were held secondary to acute renal failure secondary to emboli though creatinine has rebounded well with creatinine level of 1.2 and BUN 15 by discharge.  GFR is decreased down to 59 so aspects of CKD will likely follow suit.  His blood pressure starting to creep back up and I will resume Norvasc in half dosage of only 5 mg daily and will suggest close monitoring of this at rehabilitation.  He was formally on lisinopril with HCTZ.  All questions answered to patient spouse as well as daughter at bedside and if we can accomplish the logistic issues needed today then I think we can transfer this patient to rehabilitation.  Cardiology wants to check a transthoracic echocardiogram prior to disposition to see if they can even visualize the vegetations without use of transesophageal method so they can pinpoint how they would like to give outpatient surveillance on these lesions.        Condition on Discharge: Improved.     Temp:  [98.5 °F (36.9 °C)-98.7 °F (37.1 °C)] 98.6 °F (37 °C)  Heart Rate:  [70-79] 70  Resp:  [18] 18  BP: (134-154)/(69-80) 134/69  Body mass index is 25.45 kg/m².    Physical Exam   Constitutional: He is oriented to person, place, and time. He appears well-developed and well-nourished.   HENT:   Head: Normocephalic and atraumatic.   Eyes: EOM are normal. Pupils are equal, round, and reactive to light.   Neck: Neck supple. No JVD present.   Cardiovascular: Normal rate and regular rhythm.    Pulmonary/Chest: Effort normal and breath sounds normal. No respiratory distress.   Abdominal: Soft. Bowel sounds are normal. He exhibits no distension. There is no tenderness.   Musculoskeletal: He exhibits no edema.   Neurological: He is alert and oriented to person, place, and time. No cranial nerve deficit.     Discharge Medications   Jalen Richards   Home Medication Instructions LANI:[id number]    Printed on:05/01/18 6010   Medication Information                       acetaminophen (TYLENOL) 325 MG tablet  Take 2 tablets by mouth Every 4 (Four) Hours As Needed for Mild Pain .             amLODIPine (NORVASC) 10 MG tablet  Take 0.5 tablets by mouth Daily.             atorvastatin (LIPITOR) 20 MG tablet  Take 20 mg by mouth Daily.             enoxaparin (LOVENOX) 80 MG/0.8ML solution syringe  Inject 0.8 mL under the skin Every 12 (Twelve) Hours.             finasteride (PROSCAR) 5 MG tablet  Take 1 tablet by mouth Daily.             levothyroxine (SYNTHROID, LEVOTHROID) 50 MCG tablet  Take 50 mcg by mouth Daily.                  Additional Instructions for the Follow-ups that You Need to Schedule     Discharge Follow-up with PCP    As directed      Follow Up Details:  pcp post rehab. Pulm/ID prn. Heme/Cards/Urology per their recs           Follow-up Information     Geovany Saba MD .    Specialties:  Emergency Medicine, General Practice  Why:  pcp post rehab. Pulm/ID prn. Heme/Cards/Urology per their recs  Contact information:  [address] [facility] [address] [phone]                 Test Results Pending at Discharge   Order Current Status    Antiphosphatidylserine IgG / M In process    Antiphosphotidyl Antibodies Panel II In process    Q Fever Antibodies IgG / IgM In process    Blood Culture - Blood, Preliminary result    Blood Culture - Blood, Preliminary result         Randy Rivas MD  05/01/18  10:57 AM    Discharge time spent greater than 30 minutes.

## 2018-05-01 NOTE — PROGRESS NOTES
"Cc g heme  Doing well no bleeding     LOS: 8 days   Patient Care Team:  Geovany Saba MD as PCP - General (Emergency Medicine)        Subjective     History of Present Illness    Subjective      Objective     Vital Signs  Temp:  [98.2 °F (36.8 °C)-98.7 °F (37.1 °C)] 98.5 °F (36.9 °C)  Heart Rate:  [75-79] 79  Resp:  [18] 18  BP: (140-154)/(72-80) 154/80    Objective:  General Appearance:  Comfortable.    Vital signs: (most recent): Blood pressure 154/80, pulse 79, temperature 98.5 °F (36.9 °C), temperature source Oral, resp. rate 18, height 180.3 cm (71\"), weight 82.8 kg (182 lb 8 oz), SpO2 92 %.  Vital signs are normal.    Output: Producing urine.    HEENT: Normal HEENT exam.    Lungs:  Normal effort and normal respiratory rate.    Abdomen: Abdomen is soft.              Results Review:  New clinical results reviewed.        Assessment/Plan     Principal Problem:    Cardiac mass  Active Problems:    Basilar artery occlusion with cerebral infarction    Generalized weakness    Hypertension    CHASE (acute kidney injury)    BPH (benign prostatic hyperplasia)    Toe cyanosis    Proteinuria    Peripheral artery embolism or thrombosis    Bladder mass    Thromboembolism of renal arteries      Assessment:  (Doing well no g heme  Ok to dc and fu three weeks with me).       Davis Yarbrough MD  05/01/18  6:44 AM            "

## 2018-05-01 NOTE — PROGRESS NOTES
On TTE, the TV lesion is barely visible, and the MV lesion really isn't well visualized.  I'll have to keep this in mind when doing outpatient surveillance.

## 2018-05-01 NOTE — PROGRESS NOTES
"DOS: 2018  NAME: Jalen Richards   : 1940  PCP: Geovany Saba MD  Chief Complaint   Patient presents with   • Weakness - Generalized     stroke 1 week ago, recommended yto come into ED by Neuro Dr. Gordon   • Back Pain     lower back pain     Stroke    Historian: Daughter, patient and nursing  Wife @ bedside has Alzheimer     Subjective: No events overnight. No specific complaints on my exam except he still notes that he feels \"slow\" in both his movements and his thinking (unchnaged for the past 2-3 days)     Patient denies HA, double/blurred vision, dizziness, numbness or loss of sensation or any other new neurological complaints at this time.       Objective:  Vital signs: /69 (BP Location: Right arm, Patient Position: Lying)   Pulse 70   Temp 98.6 °F (37 °C) (Oral)   Resp 18   Ht 180.3 cm (71\")   Wt 82.8 kg (182 lb 8 oz)   SpO2 95%   BMI 25.45 kg/m²       HEENT: Normocephalic, atraumatic   COR: RRR  Resp: Even and unlabored  Extremities: Equal pulses, non distal embolization    Neurological:   MS: AO. Language normal. No neglect. Higher integrative function normal  CN: II-XII normal (mild left facial paralysis; questionable left superior peripheral field deficit  Motor: 5/5, normal tone except left 4+/5  Sensory: Intact  Coordination: Normal (finger to nose and heel to shin)       Laboratory results:  Lab Results   Component Value Date    GLUCOSE 96 2018    CALCIUM 8.3 (L) 2018     2018    K 3.9 2018    CO2 25.5 2018     2018    BUN 15 2018    CREATININE 1.20 2018    EGFRIFNONA 59 (L) 2018    BCR 12.5 2018    ANIONGAP 9.5 2018     Lab Results   Component Value Date    WBC 14.61 (H) 2018    HGB 8.9 (L) 2018    HCT 27.4 (L) 2018    MCV 93.8 2018     2018     Lab Results   Component Value Date    CHOL 145 2018     Lab Results   Component Value Date    HDL 47 " 04/13/2018     Lab Results   Component Value Date    LDL 82 04/13/2018     Lab Results   Component Value Date    TRIG 79 04/13/2018                       Preliminary     Blood Culture   Date Value Ref Range Status   04/28/2018 No growth at 3 days  Preliminary   04/27/2018 No growth at 3 days  Preliminary         Review and interpretation of imaging:  CT brain 4/23/18: The hypodensity left occipital lobe appears unchanged there is a hypodensity in the right superior occipital lobe extending into the parietal lobe which appears new compared to the CT scan from the 12th, the left frontal calcified lesion is unchanged     I brain 4/13/18: Acute stroke left cerebellar hemisphere right medial occipital lobe and multiple small strokes in the bilateral frontal cortices     CTA HN 4/12/18:There is calcification of the aortic arch with an aneurysm medially of the proximal descending aorta, and subclavian arteries are patent and there is a severe stenosis of the origin of the left vertebral artery which is slightly smaller in the right, there is severe bulky plaque left internal carotid artery worse than right with ulceration, intracranially the left vertebral artery appears to have a severe stenosis as it penetrates the dura, the basilar artery is patent proximally but is flush occluded in its terminus, there is calcification of the intracranial internal carotid arteries and bilateral straight communicating arteries filling the basilar terminus and posterior cerebral arteries     CT Brain: hypodensity in the medial right occipital lobe that could represent acute to subacute stroke, there is an old stroke in the left occipital parietal lobe, is a dense calcification in the left subcortical frontal white matter likely representing dystrophic calcification or a cavernous angioma    MRI Brain   IMPRESSION:  1. Interval increase in multiple small and punctate foci of acute  ischemia, likely of embolic etiology.      CT Pelvis  4/27/18: IMPRESSION:  1. Critical test result. Evidence of arterial embolic disease in the  renal arteries, with associated ischemic portions of the kidneys, right  more than left (right kidney is extensively affected with sparing of the  upper pole; left kidney is predominantly affected at the mid aspect).  Possibility of septic emboli/infectious process in the kidneys is not  excluded. Small nonobstructive left nephrolith.  2.  Diffuse wall thickening in the urinary bladder may relate to  patient's stated history of bladder neoplasm, possible metastatic  retroperitoneal lymph nodes, positron emission tomography could be  considered.  3. No pulmonary embolism. Mild bilateral pleural effusions and basilar  atelectasis, follow-up recommended.  4. Adherent mucus or mucosal lesion in the right mainstem bronchus,  follow-up/further evaluation recommended as indicated.  5. Haziness around the gallbladder may be related to diffuse mesenteric  edema or could be evidence of cholecystitis; if indicated, ultrasound or  hepatobiliary scintigraphy could be considered for further evaluation.  6. Diverticulosis. No focal acute inflammatory process of bowel is  identified, follow up as indications persist. Mild pelvic, perisplenic  Ascites.     SHANNON 4/26/18: Interpretation Summary   · There is large mass on the left atrial side of both the anterior and posterior mitral valve leaflets.  · Mild mitral insufficiency  · Large mobile mass on the tricuspid valve leaflet.  · Mild tricuspid insufficiency with mild coronary hypertension RV systolic pressure of 45 mmHg.  · Atherosclerosis of the descending thoracic aorta and arch.  · Since the previous transesophageal echocardiogram the mitral valve mass is larger more diffuse on both leaflets and there is a tricuspid valve mass.        Impression:This is a 76 yo male who presented to [facility] 04/23 with complaint of confusion and progressive neurological decline. MRI revealed multiple small and  punctate foci of acute ischemias thought to be embolic in  Etiology.SHANNON on Friday showed worsening valvular lesions despite being on Eliquis. There is concern for malignancy; possible bladder CA. Hypercoagulable work-up is in progress;cystoscopy anticipated in the future. We agree with anticoagulation w/ Lovenox given the concern for malignancy; oncology to manage. We recommned continuing neurochecks; call RRT for any acute changes. Keep well hydrated. No further neurology workup indicated. We will sign off and see again upon request.Follow up with Jennifer ANTONIO In Stroke Clinic in 3 months. Given the questionable left superior visual field deficit we recommend Opthalmology clearance before resuming driving both patient and daughter at bedside verbalize understanding and agree to comply with recommendations.       Plan:  Opthalmology Appointment/clearance prior to resuming driving   Follow up in stroke clinic in 3 months w/ Jennifer ANTONIO   Lovenox 1mg/kg (per Hem./Oncology)   Lipitor 40 mg  Hydrate  Neurochecks  Non-pharmacological DVT prophylaxis  EKG Tele  Stroke Education    [place] targets for stroke prevention would be :  Blood pressure < 130/80; B12 > 500 TSH in normal range and LDL < 70; HbA1c < 6.5 and smoking cessation if applicable.

## 2018-05-01 NOTE — PROGRESS NOTES
Continued Stay Note   [facility]     Patient Name: Jalen Richards  MRN: [id number]  Today's Date: 5/1/2018    Admit Date: 4/23/2018          Discharge Plan     Row Name 05/01/18 1327       Plan    Plan Comments Family will transport    Row Name 05/01/18 1326       Plan    Plan Pt to [facility] for skilled care pending pre cert.              Discharge Codes    No documentation.       Expected Discharge Date and Time     Expected Discharge Date Expected Discharge Time    May 1, 2018             Geetha Mandujano RN

## 2018-05-01 NOTE — NURSING NOTE
Reviewed earlier today for our program. Talked with daughter, pt in echo. Based on current status, and planned discharge home with spouse who has severe dementia, per daughter, rehab team recommends longer term rehab at subacute level. CCP working on options. Jenna, Isrrael RN rehab admission nurse [phone]

## 2018-05-01 NOTE — PLAN OF CARE
Problem: Patient Care Overview  Goal: Plan of Care Review  Outcome: Ongoing (interventions implemented as appropriate)   05/01/18 1604   Coping/Psychosocial   Plan of Care Reviewed With patient   Plan of Care Review   Progress improving   OTHER   Outcome Summary pt showing steady improvement in functional mobility, cont to have balance deficits, and delayed processing with activity but following commands more consistently, good participation

## 2018-05-02 LAB
BACTERIA SPEC AEROBE CULT: NORMAL
C BURNET PH1 IGG SER-ACNC: NEGATIVE
C BURNET PH1 IGM SER-ACNC: NEGATIVE
C BURNET PH2 IGG SER-ACNC: NEGATIVE
C BURNET PH2 IGM SER-ACNC: NEGATIVE

## 2018-05-02 NOTE — PROGRESS NOTES
Case Management Discharge Note    Final Note: Pt was dc'd to a skilled bed last pm @ [facility]// family transported.    Destination - Selection Complete     Service Request Status Selected Specialties Address Phone Number Fax Number    [facility] Selected Skilled Nursing Facility [address] [facility] [id number] [phone]        Agnes Haro, RN 4/30/2018 1717    Referral to [place].               [facility] Declined N/A [address] [address] [phone]        Agnes Haro, RN 4/30/2018 1714    Referral to [facility].  No beds available at this time               [facility] Declined N/A [address] [facility] [address] [phone] --        Agnes Haro, RN 4/30/2018 1712    VM to Intake                 Durable Medical Equipment     No service coordination in this encounter.      Dialysis/Infusion     No service coordination in this encounter.      Home Medical Care     No service coordination in this encounter.      Social Care     No service coordination in this encounter.        Other: Other    Final Discharge Disposition Code: 03 - skilled nursing facility (SNF)

## 2018-05-02 NOTE — PROGRESS NOTES
"TJ Mercado Nurse Practitioner Attested Cardiology  Progress Notes Date of Service: 2018 11:17 AM      Attestation signed by Cyrus Renteria MD at 2018 11:37 AM   I have reviewed the documentation above and agree.     I am getting a limited TTE today to see if I can now appreciate the vegetations by surface echo (this would be easier to follow serially than repeat SHANNON).  This will be done prior to discharge.         Expand All Collapse All    []Manual[]Template  []Copied       Patient Name: Jalen Richards  :1940  77 y.o.        Patient Care Team:  Geovany Saba MD as PCP - General (Emergency Medicine)     Chief Complaint: follow up peripheral artery embolism, mitral and tricuspid valve vegetations      Interval History: He feels pretty good today. Making good but slow progress. Plans to [facility].            Objective      Vital Signs  Temp:  [98.5 °F (36.9 °C)-98.7 °F (37.1 °C)] 98.6 °F (37 °C)  Heart Rate:  [70-79] 70  Resp:  [18] 18  BP: (134-154)/(69-80) 134/69  No intake or output data in the 24 hours ending 18 1119      Flowsheet Rows    Flowsheet Row First Filed Value   Admission Height 180.3 cm (71\") Documented at 2018   Admission Weight 80.3 kg (177 lb) Documented at 2018             Physical Exam:           General Appearance:    Alert, cooperative, in no acute distress   Lungs:     Clear to auscultation.  Normal respiratory effort and rate.      Heart:    Regular rhythm and normal rate, normal S1 and S2, no murmurs, gallops or rubs.     Chest Wall:    No abnormalities observed   Abdomen:     Soft, nontender, positive bowel sounds.     Extremities:   + cyanosis right big toe, left fifth toe, right fifth finger, no clubbing or edema.  No marked joint deformities.  Adequate musculoskeletal strength.        Results Review:       Results from last 7 days  Lab Units 18  0443   SODIUM mmol/L 139   POTASSIUM mmol/L 3.9   CHLORIDE mmol/L " 104   CO2 mmol/L 25.5   BUN mg/dL 15   CREATININE mg/dL 1.20   GLUCOSE mg/dL 96   CALCIUM mg/dL 8.3*         Results from last 7 days  Lab Units 04/24/18  1627 04/24/18  1153   CK TOTAL U/L  --  108   TROPONIN T ng/mL 0.515* 0.475*         Results from last 7 days  Lab Units 05/01/18  0443   WBC 10*3/mm3 14.61*   HEMOGLOBIN g/dL 8.9*   HEMATOCRIT % 27.4*   PLATELETS 10*3/mm3 363         Results from last 7 days  Lab Units 05/01/18  0443 04/30/18  0600 04/29/18  1056   04/24/18  1627   INR    --   --   --   --  1.19*   APTT seconds 94.8* 81.4* 66.0*  < > 33.2   < > = values in this interval not displayed.     Results from last 7 days  Lab Units 04/30/18  0600   MAGNESIUM mg/dL 2.4                     Medication Review:      atorvastatin 20 mg Oral Nightly   enoxaparin 80 mg Subcutaneous Q12H   finasteride 5 mg Oral Daily   levothyroxine 50 mcg Oral Q AM               Assessment/Plan      1.  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  Rapid progression of valvular lesions is likely to be the cause.       2.  Hypertension -- generally stable.     3.  CHASE -- improved.  CT shows renal infarctions.     4.  Anorexia -- improving slightly.     6.  Bladder mass -- unable to perform TURBT due to bleeding risk on anticoagulation.      Lovenox has been ordered per hematology. Plan for several weeks with this, and possibly transitioning to oral anticoagulation as an outpatient. OK to discharge to rehab at any time from a cardiac standpoint. He has an appointment with Dr. Renteria on 5/23 which he should keep.      TJ Garcia  [facility]  05/01/18  11:19 AM             Addendum : this note was copy and pasted from its original note to allow the addition of chief complaint.

## 2018-05-03 ENCOUNTER — TELEPHONE (OUTPATIENT)
Dept: NEUROLOGY | Facility: CLINIC | Age: 78
End: 2018-05-03

## 2018-05-03 LAB — BACTERIA SPEC AEROBE CULT: NORMAL

## 2018-05-03 NOTE — TELEPHONE ENCOUNTER
I scheduled pt 3 month hospital f/u with Jennifer on 7/30/18 at 8am. Mailed packet out today as well.

## 2018-05-03 NOTE — TELEPHONE ENCOUNTER
----- Message from TJ Hdez sent at 4/26/2018  3:16 PM EDT -----  With Jennifer PAREDES   ----- Message -----  From: Daniela Mckenzie MA  Sent: 4/26/2018   8:05 AM  To: TJ Hdez    I looked at your note and it does not list when f/u is needed and who? Is this pt a 3 month f/u? RB or LQ? Thanks!    ----- Message -----  From: Brandy Posadas  Sent: 4/25/2018   2:59 PM  To: Daniela Mckenzie MA        ----- Message -----  From: Ashly Kessler RN  Sent: 4/16/2018   7:55 AM  To: Brandy Madera and Darline ritchei saw.  Santy says follow up in 6-8 weeks with who?

## 2018-05-05 ENCOUNTER — LAB REQUISITION (OUTPATIENT)
Dept: LAB | Facility: HOSPITAL | Age: 78
End: 2018-05-05

## 2018-05-05 DIAGNOSIS — Z00.00 ROUTINE GENERAL MEDICAL EXAMINATION AT A HEALTH CARE FACILITY: ICD-10-CM

## 2018-05-05 LAB
BASOPHILS # BLD AUTO: 0.02 10*3/MM3 (ref 0–0.2)
BASOPHILS NFR BLD AUTO: 0.2 % (ref 0–1.5)
DEPRECATED RDW RBC AUTO: 45.1 FL (ref 37–54)
EOSINOPHIL # BLD AUTO: 0.09 10*3/MM3 (ref 0–0.7)
EOSINOPHIL NFR BLD AUTO: 0.8 % (ref 0.3–6.2)
ERYTHROCYTE [DISTWIDTH] IN BLOOD BY AUTOMATED COUNT: 13 % (ref 11.5–14.5)
HCT VFR BLD AUTO: 28.9 % (ref 40.4–52.2)
HGB BLD-MCNC: 9.1 G/DL (ref 13.7–17.6)
IMM GRANULOCYTES # BLD: 0.12 10*3/MM3 (ref 0–0.03)
IMM GRANULOCYTES NFR BLD: 1.1 % (ref 0–0.5)
LYMPHOCYTES # BLD AUTO: 0.77 10*3/MM3 (ref 0.9–4.8)
LYMPHOCYTES NFR BLD AUTO: 7.1 % (ref 19.6–45.3)
MCH RBC QN AUTO: 30.2 PG (ref 27–32.7)
MCHC RBC AUTO-ENTMCNC: 31.5 G/DL (ref 32.6–36.4)
MCV RBC AUTO: 96 FL (ref 79.8–96.2)
MONOCYTES # BLD AUTO: 0.39 10*3/MM3 (ref 0.2–1.2)
MONOCYTES NFR BLD AUTO: 3.6 % (ref 5–12)
NEUTROPHILS # BLD AUTO: 9.53 10*3/MM3 (ref 1.9–8.1)
NEUTROPHILS NFR BLD AUTO: 87.2 % (ref 42.7–76)
PLATELET # BLD AUTO: 463 10*3/MM3 (ref 140–500)
PMV BLD AUTO: 8.6 FL (ref 6–12)
RBC # BLD AUTO: 3.01 10*6/MM3 (ref 4.6–6)
WBC NRBC COR # BLD: 10.92 10*3/MM3 (ref 4.5–10.7)

## 2018-05-05 PROCEDURE — 85025 COMPLETE CBC W/AUTO DIFF WBC: CPT

## 2018-05-07 LAB
ANTI-PHOSPHATIDIC ACID: NORMAL
ANTI-PHOSPHATIDIC,IGA: 6.5 U/ML
ANTI-PHOSPHATIDIC,IGG: 2.5 U/ML
ANTI-PHOSPHATIDIC,IGM: 2 U/ML
ANTI-PHOSPHATIDYL GLYCEROL, IGA: 4.4 U/ML
ANTI-PHOSPHATIDYL GLYCEROL, IGG: 2.2 U/ML
ANTI-PHOSPHATIDYL GLYCEROL, IGM: 2.4 U/ML
ANTI-PHOSPHATIDYL GLYCEROL: NORMAL
ANTI-PHOSPHATIDYL INOSITOL: NORMAL
ANTI-PHOSPHATIDYL,IGA: 4.3 U/ML
ANTI-PHOSPHATIDYL,IGG: 2 U/ML
ANTI-PHOSPHATIDYL,IGM: 4.4 U/ML
ANTI-PHOSPHATIDYLETHANOLAMINE, IGA: 1.3 U/ML
ANTI-PHOSPHATIDYLETHANOLAMINE, IGG: 0.4 U/ML
ANTI-PHOSPHATIDYLETHANOLAMINE, IGM: 1.1 U/ML
ANTI-PHOSPHATIDYLETHANOLAMINE: NORMAL

## 2018-05-08 ENCOUNTER — HOSPITAL ENCOUNTER (INPATIENT)
Facility: HOSPITAL | Age: 78
LOS: 2 days | Discharge: HOSPICE | End: 2018-05-10
Attending: EMERGENCY MEDICINE | Admitting: HOSPITALIST

## 2018-05-08 ENCOUNTER — APPOINTMENT (OUTPATIENT)
Dept: CT IMAGING | Facility: HOSPITAL | Age: 78
End: 2018-05-08

## 2018-05-08 DIAGNOSIS — I63.411 CEREBROVASCULAR ACCIDENT (CVA) DUE TO EMBOLISM OF RIGHT MIDDLE CEREBRAL ARTERY (HCC): Primary | ICD-10-CM

## 2018-05-08 LAB
ALBUMIN SERPL-MCNC: 3.5 G/DL (ref 3.5–5.2)
ALBUMIN/GLOB SERPL: 1.2 G/DL
ALP SERPL-CCNC: 134 U/L (ref 39–117)
ALT SERPL W P-5'-P-CCNC: 57 U/L (ref 1–41)
ANION GAP SERPL CALCULATED.3IONS-SCNC: 15.1 MMOL/L
AST SERPL-CCNC: 41 U/L (ref 1–40)
BASOPHILS # BLD AUTO: 0.04 10*3/MM3 (ref 0–0.2)
BASOPHILS NFR BLD AUTO: 0.3 % (ref 0–1.5)
BILIRUB SERPL-MCNC: 0.3 MG/DL (ref 0.1–1.2)
BUN BLD-MCNC: 21 MG/DL (ref 8–23)
BUN/CREAT SERPL: 13.9 (ref 7–25)
CALCIUM SPEC-SCNC: 8.2 MG/DL (ref 8.6–10.5)
CHLORIDE SERPL-SCNC: 94 MMOL/L (ref 98–107)
CO2 SERPL-SCNC: 26.9 MMOL/L (ref 22–29)
CREAT BLD-MCNC: 1.51 MG/DL (ref 0.76–1.27)
DEPRECATED RDW RBC AUTO: 44.2 FL (ref 37–54)
EOSINOPHIL # BLD AUTO: 0.04 10*3/MM3 (ref 0–0.7)
EOSINOPHIL NFR BLD AUTO: 0.3 % (ref 0.3–6.2)
ERYTHROCYTE [DISTWIDTH] IN BLOOD BY AUTOMATED COUNT: 13.1 % (ref 11.5–14.5)
GFR SERPL CREATININE-BSD FRML MDRD: 45 ML/MIN/1.73
GLOBULIN UR ELPH-MCNC: 3 GM/DL
GLUCOSE BLD-MCNC: 95 MG/DL (ref 65–99)
HCT VFR BLD AUTO: 31.6 % (ref 40.4–52.2)
HGB BLD-MCNC: 10.1 G/DL (ref 13.7–17.6)
IMM GRANULOCYTES # BLD: 0.08 10*3/MM3 (ref 0–0.03)
IMM GRANULOCYTES NFR BLD: 0.5 % (ref 0–0.5)
INR PPP: 1.14 (ref 0.9–1.1)
LYMPHOCYTES # BLD AUTO: 1.23 10*3/MM3 (ref 0.9–4.8)
LYMPHOCYTES NFR BLD AUTO: 8.3 % (ref 19.6–45.3)
MCH RBC QN AUTO: 30.1 PG (ref 27–32.7)
MCHC RBC AUTO-ENTMCNC: 32 G/DL (ref 32.6–36.4)
MCV RBC AUTO: 94.3 FL (ref 79.8–96.2)
MONOCYTES # BLD AUTO: 0.34 10*3/MM3 (ref 0.2–1.2)
MONOCYTES NFR BLD AUTO: 2.3 % (ref 5–12)
NEUTROPHILS # BLD AUTO: 13.07 10*3/MM3 (ref 1.9–8.1)
NEUTROPHILS NFR BLD AUTO: 88.3 % (ref 42.7–76)
PLATELET # BLD AUTO: 292 10*3/MM3 (ref 140–500)
PMV BLD AUTO: 8.9 FL (ref 6–12)
POTASSIUM BLD-SCNC: 4 MMOL/L (ref 3.5–5.2)
PROT SERPL-MCNC: 6.5 G/DL (ref 6–8.5)
PROTHROMBIN TIME: 14.4 SECONDS (ref 11.7–14.2)
RBC # BLD AUTO: 3.35 10*6/MM3 (ref 4.6–6)
SODIUM BLD-SCNC: 136 MMOL/L (ref 136–145)
WBC NRBC COR # BLD: 14.8 10*3/MM3 (ref 4.5–10.7)

## 2018-05-08 PROCEDURE — 93005 ELECTROCARDIOGRAM TRACING: CPT | Performed by: EMERGENCY MEDICINE

## 2018-05-08 PROCEDURE — 80053 COMPREHEN METABOLIC PANEL: CPT | Performed by: EMERGENCY MEDICINE

## 2018-05-08 PROCEDURE — 85610 PROTHROMBIN TIME: CPT | Performed by: EMERGENCY MEDICINE

## 2018-05-08 PROCEDURE — 99285 EMERGENCY DEPT VISIT HI MDM: CPT

## 2018-05-08 PROCEDURE — 85025 COMPLETE CBC W/AUTO DIFF WBC: CPT | Performed by: EMERGENCY MEDICINE

## 2018-05-08 PROCEDURE — 70450 CT HEAD/BRAIN W/O DYE: CPT

## 2018-05-08 PROCEDURE — 87040 BLOOD CULTURE FOR BACTERIA: CPT | Performed by: HOSPITALIST

## 2018-05-08 PROCEDURE — 93010 ELECTROCARDIOGRAM REPORT: CPT | Performed by: INTERNAL MEDICINE

## 2018-05-08 RX ORDER — FERROUS SULFATE 325(65) MG
325 TABLET ORAL
Status: DISCONTINUED | OUTPATIENT
Start: 2018-05-09 | End: 2018-05-10

## 2018-05-08 RX ORDER — ACETAMINOPHEN 325 MG/1
650 TABLET ORAL EVERY 4 HOURS PRN
Status: DISCONTINUED | OUTPATIENT
Start: 2018-05-08 | End: 2018-05-10 | Stop reason: HOSPADM

## 2018-05-08 RX ORDER — ASPIRIN 300 MG/1
300 SUPPOSITORY RECTAL DAILY
Status: DISCONTINUED | OUTPATIENT
Start: 2018-05-09 | End: 2018-05-09

## 2018-05-08 RX ORDER — ASPIRIN 325 MG
325 TABLET ORAL DAILY
Status: DISCONTINUED | OUTPATIENT
Start: 2018-05-09 | End: 2018-05-09

## 2018-05-08 RX ORDER — LEVOTHYROXINE SODIUM 0.05 MG/1
50 TABLET ORAL DAILY
Status: DISCONTINUED | OUTPATIENT
Start: 2018-05-09 | End: 2018-05-10 | Stop reason: HOSPADM

## 2018-05-08 RX ORDER — HYDROCHLOROTHIAZIDE 25 MG/1
25 TABLET ORAL DAILY
COMMUNITY
End: 2018-05-10 | Stop reason: HOSPADM

## 2018-05-08 RX ORDER — SODIUM CHLORIDE 0.9 % (FLUSH) 0.9 %
1-10 SYRINGE (ML) INJECTION AS NEEDED
Status: DISCONTINUED | OUTPATIENT
Start: 2018-05-08 | End: 2018-05-10 | Stop reason: HOSPADM

## 2018-05-08 RX ORDER — FERROUS SULFATE 325(65) MG
325 TABLET ORAL
COMMUNITY
End: 2018-05-10 | Stop reason: HOSPADM

## 2018-05-08 RX ORDER — FINASTERIDE 5 MG/1
5 TABLET, FILM COATED ORAL DAILY
Status: DISCONTINUED | OUTPATIENT
Start: 2018-05-09 | End: 2018-05-10 | Stop reason: HOSPADM

## 2018-05-08 RX ORDER — ASPIRIN 325 MG
325 TABLET ORAL ONCE
Status: COMPLETED | OUTPATIENT
Start: 2018-05-08 | End: 2018-05-08

## 2018-05-08 RX ORDER — AMLODIPINE BESYLATE 5 MG/1
5 TABLET ORAL
Status: DISCONTINUED | OUTPATIENT
Start: 2018-05-09 | End: 2018-05-10 | Stop reason: HOSPADM

## 2018-05-08 RX ORDER — ATORVASTATIN CALCIUM 80 MG/1
80 TABLET, FILM COATED ORAL NIGHTLY
Status: DISCONTINUED | OUTPATIENT
Start: 2018-05-08 | End: 2018-05-09

## 2018-05-08 RX ORDER — SODIUM CHLORIDE 9 MG/ML
100 INJECTION, SOLUTION INTRAVENOUS CONTINUOUS
Status: DISCONTINUED | OUTPATIENT
Start: 2018-05-08 | End: 2018-05-09

## 2018-05-08 RX ORDER — SODIUM CHLORIDE 0.9 % (FLUSH) 0.9 %
10 SYRINGE (ML) INJECTION AS NEEDED
Status: DISCONTINUED | OUTPATIENT
Start: 2018-05-08 | End: 2018-05-10 | Stop reason: HOSPADM

## 2018-05-08 RX ADMIN — SODIUM CHLORIDE 1000 ML: 9 INJECTION, SOLUTION INTRAVENOUS at 18:59

## 2018-05-08 RX ADMIN — ASPIRIN 325 MG: 325 TABLET ORAL at 20:47

## 2018-05-08 NOTE — ED TRIAGE NOTES
"EMS states \"I'm not really sure why the nursing home called us. It sounded like he could have had a TIA within the last 4 days, but I'm not sure.\" Patient also unaware of why he was sent to ER. Patient has no complaints at this time.  "

## 2018-05-08 NOTE — ED TRIAGE NOTES
Per ems, NH sent pt out due to L sided weakness 4 days ago.  No deficits today. Pt has no complaints.  States he felt sick/weak few days ago ,but no feels fine

## 2018-05-08 NOTE — ED PROVIDER NOTES
" EMERGENCY DEPARTMENT ENCOUNTER    CHIEF COMPLAINT  Chief Complaint: L sided weakness  History given by: pt/family  History limited by: none  Room Number: 38/38  PMD: Geovany Saba MD      HPI:  Pt is a 77 y.o. male who presents with history of CVA complaining of L sided weakness. Pt was sent here from rehab due to his decline in his left extremities that began 3 days ago. Pt was discharged May 1st for previous stroke and was left with hemianopsia but was able to to function his left extremities normally after. Pt denies CP but states he has difficulty ambulating and sometimes \"has trouble getting his words out\".    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   • Generalized weakness 04/23/2018   • Hypertension 04/24/2018   • CHASE (acute kidney injury) 04/24/2018   • Pyelonephritis 04/24/2018   • BPH (benign prostatic hyperplasia) 04/24/2018   • Toe cyanosis 04/24/2018   • Proteinuria 04/25/2018   • Peripheral artery embolism or thrombosis 04/25/2018   • Cardiac mass 04/27/2018   • Bladder mass 04/27/2018   • 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 No pertinent family history.    SOCIAL HISTORY  Social History     Social History   • Marital status:      Spouse name: Katelyn   • Number of children: N/A   • Years of education: N/A     Occupational History   • Not on file.     Social History Main Topics   • Smoking status: Current Every Day Smoker     Packs/day: 1.00   • Smokeless tobacco: Never Used      Comment: Discussed with patient 5-7 min. [employer]uit [employer] [place] information provided    • Alcohol use No   • Drug use: No   • Sexual activity: Not Currently     Other Topics Concern   • Not on file     Social History Narrative   • No narrative on file       ALLERGIES  Review of patient's allergies indicates no known allergies.    REVIEW OF SYSTEMS  Review of Systems   Constitutional: Negative for activity change, appetite change and fever.   HENT: Negative for congestion and sore throat.    Eyes: Negative.    Respiratory: Negative for cough and shortness of breath.    Cardiovascular: Negative for chest pain and leg swelling.   Gastrointestinal: Negative for abdominal pain, diarrhea and vomiting.   Endocrine: Negative.    Genitourinary: Negative for decreased urine volume and dysuria.   Musculoskeletal: Negative for neck pain.   Skin: Negative for rash and wound.   Allergic/Immunologic: Negative.    Neurological: Positive for speech difficulty and weakness (left side). Negative for numbness and headaches.   Hematological: Negative.    Psychiatric/Behavioral: Negative.    All other systems reviewed and are negative.      PHYSICAL EXAM  ED Triage Vitals [05/08/18 1704]   Temp Heart Rate Resp BP SpO2   97.9 °F (36.6 °C) 82 20 166/81 97 %      Temp src Heart Rate Source Patient Position BP Location FiO2 (%)   Tympanic -- -- -- --       Physical Exam   Constitutional: He is oriented to person, place, and time and well-developed, well-nourished, and in no distress.   HENT:   Head: Normocephalic and  atraumatic.   Eyes: EOM are normal. Pupils are equal, round, and reactive to light.   Neck: Normal range of motion. Neck supple.   Cardiovascular: Normal rate, regular rhythm and normal heart sounds.    Pulmonary/Chest: Effort normal and breath sounds normal. No respiratory distress.   Abdominal: Soft. Bowel sounds are normal. There is no tenderness. There is no rebound and no guarding.   Musculoskeletal: Normal range of motion. He exhibits no edema.   Neurological: He is alert and oriented to person, place, and time. He has normal sensation. He displays weakness (to L extremities). He displays normal speech. No sensory deficit. He has an abnormal Straight Leg Raise Test (left side). He shows pronator drift.   Muscular strength 4/5   Skin: Skin is warm and dry.   Psychiatric: Mood and affect normal.   Nursing note and vitals reviewed.      LAB RESULTS  Lab Results (last 24 hours)     Procedure Component Value Units Date/Time    CBC & Differential [[id number]] Collected:  05/08/18 1858    Specimen:  Blood Updated:  05/08/18 1910    Narrative:       The following orders were created for panel order CBC & Differential.  Procedure                               Abnormality         Status                     ---------                               -----------         ------                     CBC Auto Differential[[id number]]        Abnormal            Final result                 Please view results for these tests on the individual orders.    Comprehensive Metabolic Panel [[id number]]  (Abnormal) Collected:  05/08/18 1858    Specimen:  Blood Updated:  05/08/18 1941     Glucose 95 mg/dL      BUN 21 mg/dL      Creatinine 1.51 (H) mg/dL      Sodium 136 mmol/L      Potassium 4.0 mmol/L      Chloride 94 (L) mmol/L      CO2 26.9 mmol/L      Calcium 8.2 (L) mg/dL      Total Protein 6.5 g/dL      Albumin 3.50 g/dL      ALT (SGPT) 57 (H) U/L      AST (SGOT) 41 (H) U/L      Alkaline Phosphatase 134 (H) U/L      Total Bilirubin 0.3  mg/dL      eGFR Non African Amer 45 (L) mL/min/1.73      Globulin 3.0 gm/dL      A/G Ratio 1.2 g/dL      BUN/Creatinine Ratio 13.9     Anion Gap 15.1 mmol/L     Narrative:       The MDRD GFR formula is only valid for adults with stable renal function between ages 18 and 70.    Protime-INR [[id number]]  (Abnormal) Collected:  05/08/18 1858    Specimen:  Blood Updated:  05/08/18 1919     Protime 14.4 (H) Seconds      INR 1.14 (H)    CBC Auto Differential [[id number]]  (Abnormal) Collected:  05/08/18 1858    Specimen:  Blood Updated:  05/08/18 1910     WBC 14.80 (H) 10*3/mm3      RBC 3.35 (L) 10*6/mm3      Hemoglobin 10.1 (L) g/dL      Hematocrit 31.6 (L) %      MCV 94.3 fL      MCH 30.1 pg      MCHC 32.0 (L) g/dL      RDW 13.1 %      RDW-SD 44.2 fl      MPV 8.9 fL      Platelets 292 10*3/mm3      Neutrophil % 88.3 (H) %      Lymphocyte % 8.3 (L) %      Monocyte % 2.3 (L) %      Eosinophil % 0.3 %      Basophil % 0.3 %      Immature Grans % 0.5 %      Neutrophils, Absolute 13.07 (H) 10*3/mm3      Lymphocytes, Absolute 1.23 10*3/mm3      Monocytes, Absolute 0.34 10*3/mm3      Eosinophils, Absolute 0.04 10*3/mm3      Basophils, Absolute 0.04 10*3/mm3      Immature Grans, Absolute 0.08 (H) 10*3/mm3           I ordered the above labs and reviewed the results    RADIOLOGY  CT Head Without Contrast   Final Result           No acute intracranial hemorrhage or hydrocephalus. Chronic changes   of the brain. If there is further clinical concern, MRI could be   considered for further evaluation.       This report was finalized on 5/8/2018 7:36 PM by Dr. Jarad Kothari M.D.               I ordered the above noted radiological studies. Interpreted by radiologist. Reviewed by me in PACS.       PROCEDURES  Procedures  Interval: baseline  1a. Level of Consciousness: 0-->Alert, keenly responsive  1b. LOC Questions: 0-->Answers both questions correctly  1c. LOC Commands: 0-->Performs both tasks correctly  2. Best Gaze:  0-->Normal  3. Visual: 1-->Partial hemianopia  4. Facial Palsy: 0-->Normal symmetrical movements  5a. Motor Arm, Left: 1-->Drift, limb holds 90 (or 45) degrees, but drifts down before full 10 seconds, does not hit bed or other support  5b. Motor Arm, Right: 0-->No drift, limb holds 90 (or 45) degrees for full 10 secs  6a. Motor Leg, Left: 1-->Drift, leg falls by the end of the 5-sec period but does not hit bed  6b. Motor Leg, Right: 0-->No drift, leg holds 30 degree position for full 5 secs  7. Limb Ataxia: 2-->Present in two limbs  8. Sensory: 0-->Normal, no sensory loss  9. Best Language: 1-->Mild-to-moderate aphasia, some obvious loss of fluency or facility of comprehension, without significant limitation on ideas expressed or form of expression. Reduction of speech and/or comprehension, however, makes conversation. . . (see row details)  10. Dysarthria: 0-->Normal  11. Extinction and Inattention (formerly Neglect): 0-->No abnormality    Total (NIH Stroke Scale): 6    Pt is NOT a tPA candidate due to onset of symptoms > 4 hours    EKG           EKG time: 1851  Rhythm/Rate: SR/85  P waves and OH: normal OH interval  QRS, axis: posterior Q waves  ST and T waves: normal     Interpreted Contemporaneously by me, independently viewed  changed compared to prior 4/25/18      PROGRESS AND CONSULTS  ED Course   1843- Labs and EKG ordered. Discussed pt's case with Dr. Madera (neuro) who states to start with a plain Head CT    2021- Call placed to [facility]    2025- Rechecked pt, discussed CT results and plan to consult [facility] for admission. Pt/family understand and agree to plan     2033- Discussed pt's case with Dr. Jordan ([facility]) who agrees to admit the pt and states to give him Aspirin 325 mg    MEDICAL DECISION MAKING  Results were reviewed/discussed with the patient and they were also made aware of online access. Pt also made aware that some labs, such as cultures, will not be resulted during ER visit and follow up with PMD is  necessary.     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 Checked to see if pt was hypercoagulable and was not found to be )  Discuss the patient with other providers: yes (Dr. Madera (neuro))           DIAGNOSIS  Final diagnoses:   Cerebrovascular accident (CVA) due to embolism of right middle cerebral artery       DISPOSITION  ADMISSION    Discussed treatment plan and reason for admission with pt/family and admitting physician.  Pt/family voiced understanding of the plan for admission for further testing/treatment as needed.         Latest Documented Vital Signs:  As of 8:37 PM  BP- 155/86 HR- 86 Temp- 97.9 °F (36.6 °C) (Tympanic) O2 sat- 92%    --  Documentation assistance provided by karma Mittal for Dr. Diaz.  Information recorded by the scrbaylee was done at my direction and has been verified and validated by me.            Alber Mittal  05/08/18 [id number]       Vitor Diaz MD  05/09/18 [id number]

## 2018-05-08 NOTE — ED NOTES
"Spoke with nursing home RN (Ivan). She reports she has been off work for 4 days and has not assessed the patient but other staff reports he has been \"declining\". She states physical therapy reported the patient was exhibiting left-sided neglect today. RN reports she did not observe any left-sided deficits but patient's family requested the patient be evaluated by the ER. Patient currently not exhibiting any left-sided deficits.     Erica Pires RN  05/08/18 [id number]    "

## 2018-05-09 ENCOUNTER — APPOINTMENT (OUTPATIENT)
Dept: CARDIOLOGY | Facility: HOSPITAL | Age: 78
End: 2018-05-09
Attending: HOSPITALIST

## 2018-05-09 ENCOUNTER — APPOINTMENT (OUTPATIENT)
Dept: MRI IMAGING | Facility: HOSPITAL | Age: 78
End: 2018-05-09
Attending: HOSPITALIST

## 2018-05-09 PROBLEM — R31.9 HEMATURIA: Status: ACTIVE | Noted: 2018-05-09

## 2018-05-09 PROBLEM — D64.9 ANEMIA: Status: ACTIVE | Noted: 2018-05-09

## 2018-05-09 LAB
ANION GAP SERPL CALCULATED.3IONS-SCNC: 9.2 MMOL/L
BASOPHILS # BLD AUTO: 0.03 10*3/MM3 (ref 0–0.2)
BASOPHILS NFR BLD AUTO: 0.3 % (ref 0–1.5)
BH CV LOW VAS LEFT GASTRONEMIUS VESSEL: 1
BH CV LOW VAS LEFT GREATER SAPH AK VESSEL: 1
BH CV LOW VAS LEFT GREATER SAPH BK VESSEL: 1
BH CV LOWER VASCULAR LEFT COMMON FEMORAL AUGMENT: NORMAL
BH CV LOWER VASCULAR LEFT COMMON FEMORAL COMPETENT: NORMAL
BH CV LOWER VASCULAR LEFT COMMON FEMORAL COMPRESS: NORMAL
BH CV LOWER VASCULAR LEFT COMMON FEMORAL PHASIC: NORMAL
BH CV LOWER VASCULAR LEFT COMMON FEMORAL SPONT: NORMAL
BH CV LOWER VASCULAR LEFT DISTAL FEMORAL COMPRESS: NORMAL
BH CV LOWER VASCULAR LEFT GASTRONEMIUS COMPRESS: NORMAL
BH CV LOWER VASCULAR LEFT GASTRONEMIUS THROMBUS: NORMAL
BH CV LOWER VASCULAR LEFT GREATER SAPH AK COMPRESS: NORMAL
BH CV LOWER VASCULAR LEFT GREATER SAPH AK THROMBUS: NORMAL
BH CV LOWER VASCULAR LEFT GREATER SAPH BK COMPRESS: NORMAL
BH CV LOWER VASCULAR LEFT GREATER SAPH BK THROMBUS: NORMAL
BH CV LOWER VASCULAR LEFT LESSER SAPH COMPRESS: NORMAL
BH CV LOWER VASCULAR LEFT MID FEMORAL AUGMENT: NORMAL
BH CV LOWER VASCULAR LEFT MID FEMORAL COMPETENT: NORMAL
BH CV LOWER VASCULAR LEFT MID FEMORAL COMPRESS: NORMAL
BH CV LOWER VASCULAR LEFT MID FEMORAL PHASIC: NORMAL
BH CV LOWER VASCULAR LEFT MID FEMORAL SPONT: NORMAL
BH CV LOWER VASCULAR LEFT PERONEAL COMPRESS: NORMAL
BH CV LOWER VASCULAR LEFT POPLITEAL AUGMENT: NORMAL
BH CV LOWER VASCULAR LEFT POPLITEAL COMPETENT: NORMAL
BH CV LOWER VASCULAR LEFT POPLITEAL COMPRESS: NORMAL
BH CV LOWER VASCULAR LEFT POPLITEAL PHASIC: NORMAL
BH CV LOWER VASCULAR LEFT POPLITEAL SPONT: NORMAL
BH CV LOWER VASCULAR LEFT POSTERIOR TIBIAL COMPRESS: NORMAL
BH CV LOWER VASCULAR LEFT PROXIMAL FEMORAL COMPRESS: NORMAL
BH CV LOWER VASCULAR LEFT SAPHENOFEMORAL JUNCTION COMPRESS: NORMAL
BH CV LOWER VASCULAR LEFT SAPHENOFEMORAL JUNCTION PHASIC: NORMAL
BH CV LOWER VASCULAR LEFT SAPHENOFEMORAL JUNCTION SPONT: NORMAL
BH CV LOWER VASCULAR RIGHT COMMON FEMORAL AUGMENT: NORMAL
BH CV LOWER VASCULAR RIGHT COMMON FEMORAL COMPETENT: NORMAL
BH CV LOWER VASCULAR RIGHT COMMON FEMORAL COMPRESS: NORMAL
BH CV LOWER VASCULAR RIGHT COMMON FEMORAL PHASIC: NORMAL
BH CV LOWER VASCULAR RIGHT COMMON FEMORAL SPONT: NORMAL
BH CV LOWER VASCULAR RIGHT DISTAL FEMORAL COMPRESS: NORMAL
BH CV LOWER VASCULAR RIGHT GASTRONEMIUS COMPRESS: NORMAL
BH CV LOWER VASCULAR RIGHT GREATER SAPH AK COMPRESS: NORMAL
BH CV LOWER VASCULAR RIGHT GREATER SAPH BK COMPRESS: NORMAL
BH CV LOWER VASCULAR RIGHT LESSER SAPH COMPRESS: NORMAL
BH CV LOWER VASCULAR RIGHT MID FEMORAL AUGMENT: NORMAL
BH CV LOWER VASCULAR RIGHT MID FEMORAL COMPETENT: NORMAL
BH CV LOWER VASCULAR RIGHT MID FEMORAL COMPRESS: NORMAL
BH CV LOWER VASCULAR RIGHT MID FEMORAL PHASIC: NORMAL
BH CV LOWER VASCULAR RIGHT MID FEMORAL SPONT: NORMAL
BH CV LOWER VASCULAR RIGHT PERONEAL COMPRESS: NORMAL
BH CV LOWER VASCULAR RIGHT POPLITEAL AUGMENT: NORMAL
BH CV LOWER VASCULAR RIGHT POPLITEAL COMPETENT: NORMAL
BH CV LOWER VASCULAR RIGHT POPLITEAL COMPRESS: NORMAL
BH CV LOWER VASCULAR RIGHT POPLITEAL PHASIC: NORMAL
BH CV LOWER VASCULAR RIGHT POPLITEAL SPONT: NORMAL
BH CV LOWER VASCULAR RIGHT POSTERIOR TIBIAL COMPRESS: NORMAL
BH CV LOWER VASCULAR RIGHT PROXIMAL FEMORAL COMPRESS: NORMAL
BH CV LOWER VASCULAR RIGHT SAPHENOFEMORAL JUNCTION COMPRESS: NORMAL
BH CV LOWER VASCULAR RIGHT SAPHENOFEMORAL JUNCTION PHASIC: NORMAL
BH CV LOWER VASCULAR RIGHT SAPHENOFEMORAL JUNCTION SPONT: NORMAL
BUN BLD-MCNC: 20 MG/DL (ref 8–23)
BUN/CREAT SERPL: 13.5 (ref 7–25)
CALCIUM SPEC-SCNC: 8.1 MG/DL (ref 8.6–10.5)
CHLORIDE SERPL-SCNC: 99 MMOL/L (ref 98–107)
CHOLEST SERPL-MCNC: 129 MG/DL (ref 0–200)
CO2 SERPL-SCNC: 28.8 MMOL/L (ref 22–29)
CREAT BLD-MCNC: 1.48 MG/DL (ref 0.76–1.27)
DEPRECATED RDW RBC AUTO: 44.5 FL (ref 37–54)
EOSINOPHIL # BLD AUTO: 0.09 10*3/MM3 (ref 0–0.7)
EOSINOPHIL NFR BLD AUTO: 0.9 % (ref 0.3–6.2)
ERYTHROCYTE [DISTWIDTH] IN BLOOD BY AUTOMATED COUNT: 13.1 % (ref 11.5–14.5)
GFR SERPL CREATININE-BSD FRML MDRD: 46 ML/MIN/1.73
GLUCOSE BLD-MCNC: 82 MG/DL (ref 65–99)
GLUCOSE BLDC GLUCOMTR-MCNC: 97 MG/DL (ref 70–130)
HBA1C MFR BLD: 5.1 % (ref 4.8–5.6)
HCT VFR BLD AUTO: 26.7 % (ref 40.4–52.2)
HDLC SERPL-MCNC: 49 MG/DL (ref 40–60)
HGB BLD-MCNC: 8.7 G/DL (ref 13.7–17.6)
IMM GRANULOCYTES # BLD: 0.06 10*3/MM3 (ref 0–0.03)
IMM GRANULOCYTES NFR BLD: 0.6 % (ref 0–0.5)
LDLC SERPL CALC-MCNC: 61 MG/DL (ref 0–100)
LDLC/HDLC SERPL: 1.25 {RATIO}
LYMPHOCYTES # BLD AUTO: 1.21 10*3/MM3 (ref 0.9–4.8)
LYMPHOCYTES NFR BLD AUTO: 11.4 % (ref 19.6–45.3)
MCH RBC QN AUTO: 30.7 PG (ref 27–32.7)
MCHC RBC AUTO-ENTMCNC: 32.6 G/DL (ref 32.6–36.4)
MCV RBC AUTO: 94.3 FL (ref 79.8–96.2)
MONOCYTES # BLD AUTO: 0.49 10*3/MM3 (ref 0.2–1.2)
MONOCYTES NFR BLD AUTO: 4.6 % (ref 5–12)
NEUTROPHILS # BLD AUTO: 8.69 10*3/MM3 (ref 1.9–8.1)
NEUTROPHILS NFR BLD AUTO: 82.2 % (ref 42.7–76)
PLATELET # BLD AUTO: 260 10*3/MM3 (ref 140–500)
PMV BLD AUTO: 9 FL (ref 6–12)
POTASSIUM BLD-SCNC: 3.6 MMOL/L (ref 3.5–5.2)
RBC # BLD AUTO: 2.83 10*6/MM3 (ref 4.6–6)
SODIUM BLD-SCNC: 137 MMOL/L (ref 136–145)
TRIGL SERPL-MCNC: 94 MG/DL (ref 0–150)
TSH SERPL DL<=0.05 MIU/L-ACNC: 4.93 MIU/ML (ref 0.27–4.2)
VLDLC SERPL-MCNC: 18.8 MG/DL (ref 5–40)
WBC NRBC COR # BLD: 10.57 10*3/MM3 (ref 4.5–10.7)

## 2018-05-09 PROCEDURE — 25010000002 ENOXAPARIN PER 10 MG: Performed by: HOSPITALIST

## 2018-05-09 PROCEDURE — 70551 MRI BRAIN STEM W/O DYE: CPT

## 2018-05-09 PROCEDURE — 83036 HEMOGLOBIN GLYCOSYLATED A1C: CPT | Performed by: HOSPITALIST

## 2018-05-09 PROCEDURE — 99223 1ST HOSP IP/OBS HIGH 75: CPT | Performed by: INTERNAL MEDICINE

## 2018-05-09 PROCEDURE — 93970 EXTREMITY STUDY: CPT

## 2018-05-09 PROCEDURE — 99406 BEHAV CHNG SMOKING 3-10 MIN: CPT | Performed by: PSYCHIATRY & NEUROLOGY

## 2018-05-09 PROCEDURE — 92610 EVALUATE SWALLOWING FUNCTION: CPT

## 2018-05-09 PROCEDURE — 99221 1ST HOSP IP/OBS SF/LOW 40: CPT | Performed by: NURSE PRACTITIONER

## 2018-05-09 PROCEDURE — 84443 ASSAY THYROID STIM HORMONE: CPT | Performed by: HOSPITALIST

## 2018-05-09 PROCEDURE — 82962 GLUCOSE BLOOD TEST: CPT

## 2018-05-09 PROCEDURE — 80061 LIPID PANEL: CPT | Performed by: HOSPITALIST

## 2018-05-09 PROCEDURE — 80048 BASIC METABOLIC PNL TOTAL CA: CPT | Performed by: HOSPITALIST

## 2018-05-09 PROCEDURE — 99222 1ST HOSP IP/OBS MODERATE 55: CPT | Performed by: PSYCHIATRY & NEUROLOGY

## 2018-05-09 PROCEDURE — 85025 COMPLETE CBC W/AUTO DIFF WBC: CPT | Performed by: HOSPITALIST

## 2018-05-09 RX ORDER — ASPIRIN 81 MG/1
81 TABLET ORAL DAILY
Status: DISCONTINUED | OUTPATIENT
Start: 2018-05-09 | End: 2018-05-10

## 2018-05-09 RX ORDER — CHOLECALCIFEROL (VITAMIN D3) 125 MCG
1000 CAPSULE ORAL DAILY
Status: DISCONTINUED | OUTPATIENT
Start: 2018-05-09 | End: 2018-05-10

## 2018-05-09 RX ORDER — FOLIC ACID 1 MG/1
1 TABLET ORAL DAILY
Status: DISCONTINUED | OUTPATIENT
Start: 2018-05-09 | End: 2018-05-10

## 2018-05-09 RX ORDER — DABIGATRAN ETEXILATE 150 MG/1
150 CAPSULE ORAL EVERY 12 HOURS SCHEDULED
Status: DISCONTINUED | OUTPATIENT
Start: 2018-05-09 | End: 2018-05-10

## 2018-05-09 RX ORDER — ATORVASTATIN CALCIUM 20 MG/1
40 TABLET, FILM COATED ORAL NIGHTLY
Status: DISCONTINUED | OUTPATIENT
Start: 2018-05-09 | End: 2018-05-10 | Stop reason: HOSPADM

## 2018-05-09 RX ADMIN — FERROUS SULFATE TAB 325 MG (65 MG ELEMENTAL FE) 325 MG: 325 (65 FE) TAB at 10:33

## 2018-05-09 RX ADMIN — ASPIRIN 81 MG: 81 TABLET ORAL at 10:35

## 2018-05-09 RX ADMIN — DABIGATRAN ETEXILATE MESYLATE 150 MG: 150 CAPSULE ORAL at 20:41

## 2018-05-09 RX ADMIN — ENOXAPARIN SODIUM 80 MG: 80 INJECTION SUBCUTANEOUS at 01:14

## 2018-05-09 RX ADMIN — SODIUM CHLORIDE 100 ML/HR: 9 INJECTION, SOLUTION INTRAVENOUS at 01:15

## 2018-05-09 RX ADMIN — AMLODIPINE BESYLATE 5 MG: 5 TABLET ORAL at 10:33

## 2018-05-09 RX ADMIN — ENOXAPARIN SODIUM 80 MG: 80 INJECTION SUBCUTANEOUS at 10:34

## 2018-05-09 RX ADMIN — FINASTERIDE 5 MG: 5 TABLET, FILM COATED ORAL at 10:34

## 2018-05-09 RX ADMIN — ATORVASTATIN CALCIUM 40 MG: 20 TABLET, FILM COATED ORAL at 20:41

## 2018-05-09 RX ADMIN — ACETAMINOPHEN 650 MG: 325 TABLET, FILM COATED ORAL at 20:41

## 2018-05-09 RX ADMIN — LEVOTHYROXINE SODIUM 50 MCG: 50 TABLET ORAL at 10:34

## 2018-05-09 NOTE — CONSULTS
PRINCIPAL DIAGNOSIS: Possible bladder cancer and history of gross hematuria.    HISTORY OF PRESENT ILLNESS: This patient is well known to me. He was seen during his last admission. He had significant hematuria. He eventually underwent bedside cystoscopy which shows a lesion present on the posterior wall of the bladder that may be transitional cell carcinoma of the bladder versus radiation cystitis. He had significant hematuria during his last admission which resolved. The patient has chronic urinary incontinence. He states his incontinence is stable. He has had no additional hematuria since recent discharge. He is now readmitted due to progressive left-sided weakness. I was asked to see and evaluate the patient for his genitourinary issues.    PAST MEDICAL HISTORY:   1.  History of prostate cancer, on active surveillance.  2.  History of thyroid disease.  3.  Hyperlipidemia.  4.  Hypertension.  5.  Recent admission revealed cardiac valvular vegetations.   6.  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    PAST SURGICAL HISTORY:  Previous left hip replacement.    PHYSICAL EXAMINATION:  GENERAL: He is a well-nourished, well-developed white male, in no apparent distress, alert and oriented x3.  COR: Regular rate and rhythm. No S3, S4, murmur, or rub.  LUNGS: Clear to auscultation.  ABDOMEN: Flanks benign.  EXTERNAL GENITALIA: Normal. He has clear, yellow urine on his diaper.    IMPRESSION AND PLAN:  1.  Bladder lesion. This patient has a bladder lesion. This may be transitional cell carcinoma of the bladder versus radiation cystitis. At some point, once he is medically stable and can come off of his anticoagulation, a TURBT would be indicated. I do not think this will be anytime in the near future.  2.  Gross hematuria. The patient has had no recurrence of his gross hematuria. The admission H and P states there will be a consideration of adding aspirin to his current medical  regime. I will follow the patient intermittently during his hospitalization for recurrent hematuria.  3.  Prostate cancer. Patient has known prostate cancer, on active surveillance. This requires no therapy at this time.

## 2018-05-09 NOTE — CONSULTS
.     REASON FOR CONSULTATION:     Provide an opinion on any further workup or treatment of hypercoagulable status, with recurrent acute stroke, cardiac valve vegetations and left leg DVT.                             REQUESTING PHYSICIAN: Juancarlos Jordan MD     RECORDS OBTAINED:  Records of the patients history including those obtained from the referring provider were reviewed and summarized in detail.    HISTORY OF PRESENT ILLNESS:  The patient is a 77 y.o. year old male home we are consulted for evaluation of the above problem.  History was taken by reviewing medical records, and also directly provided by patient, his wife and his daughter today.       Patient is a 77-year-old male who was admitted to [facility] again yesterday because of recurrent acute stroke. This patient had first episode of stroke on 04/12/2018 because of sudden onset of aphasia and weakness in the lower extremities. He presented to the emergency room for evaluation. CT scan of the head with angiogram reported infarctions, some of them are old. He was seen by neurologist and admitted to intensive care unit. He was given full-dose aspirin 325 mg. He is not a candidate for tPA or endovascular procedure. This patient had brain MRI examination with and without contrast on 04/13/2018, which reported multifocal acute infarction with the right PCA distribution, left PCA distribution, left PICA distribution and left superior cerebellar artery distribution. The largest area of the infarction was approximately 4.5 cm x 1.6 cm. The 8 mm infarction was within the left PICA distribution. Strokes also involves the right DONN distribution and left MCA distribution. Overall, this patient has diffuse acute stroke.      Patient was given Eliquis for anticoagulation and later discharged to home on 04/16/2018. Also during hospitalization, patient was also seen by urologist, Dr. Yarbrough, for evaluation because of recent history of hematuria, which  happened about 2 weeks before the onset of stroke. Dr. Yarbrough arranged the patient for outpatient workup for that problem.     This patient also had outpatient SHANNON examination on 04/19/2018, which reported a normal ejection fraction, however, there was thickening of the leaflet of the mitral valve. There was a small mobile structure along the edge of the posterior leaflet measuring 0.45 cm and on the atrial surface it could be a vegetation or rheumatic changes or even a tiny prolapse.      The patient presented to emergency room again on 04/23/2018 because of worsening fatigue and weakness. CT scan in the emergency room was negative for acute changes. However, MRI examination on 04/24/2018 reported interval increase in multiple small and punctate foci of acute ischemia. Because of the history of hematuria, Dr. Yarbrough also requested CT scan for the abdomen and pelvis, which was also obtained on 04/24/2018. This study reported abnormalities with wall thickening of the urinary bladder very concerning for primary bladder neoplasm. There were also borderline pelvic lymphadenopathy with largest one measuring 1.2 cm in the left common iliac area.  According to the medical records, patient also had hematuria when he was on Eliquis anticoagulation.      This patient also had vascular surgery evaluation because of digital embolus confirmed by arterial ultrasound examination with moderate digital ischemia on the right hand. The left hand is normal. The lower extremity arterial examination showed severe digital ischemia in all 5 toes as well as severe digital ischemia with all 5 toes in the left foot.      This patient had transthoracic echocardiogram study on 04/27/2018, which reported large mass on the left atrial side of both the anterior and posterior mitral valve leaflets. There is also large mobile mass on the tricuspid valve leaflet. It was reported since the previous SHANNON examination, the mitral valve mass is larger,  more diffuse on both leaflets and also a new tricuspid valve mass. Because the patient had more thrombosis when he was on Eliquis plus he had hematuria, anticoagulation was switched to intravenous heparin and later switched to weight based Lovenox q.12 h..     Patient was seen by Dr. Ortiz for evaluation of hypercoagulable status and Dr. Ortiz requested antiphospholipid antibodies and anti-lupus anticoagulant. The studies were done on 04/27/2018. The studies were all negative for anticardiolipin antibodies, anti-beta-2-GP1 antibodies and antiphosphatidylserine antibodies, anti-lupus anticoagulant and also antiphospholipid antibody panel #2.    Because of worsening stroke and motor area arterial thrombosis as well as cardiac valve vegitation likely thrombotic in origin, anticoagulation was not able to be discontinued in this situation. So the patient did not have cystoscopic evaluation for suspected bladder cancer. He was discharged on Lovenox q.12 h. for anticoagulation on 05/01/2018. He was actually discharged to acute rehabilitation where he receives Lovenox.     Yesterday, this patient presented to emergency room, transferred from rehabilitation facility because of worsening symptoms related to his stroke. He had intermittent slurred speech and was not drinking water properly. Patient was brought to the emergency room and he had CT of the head without contrast, which showed no acute changes. There were chronic changes. This patient had brain MRI examination earlier today, which reported innumerable foci of supratentorial and infratentorial infarction within multiple vascular distributions. Despite many of those were evident on previous examination, there are certainly multiple new areas of infarction since that time.     His daughter also reports during the stay at rehabilitation, this patient also had recurrent episodes of gross hematuria. Since admission yesterday, there is no recurrence of hematuria. Patient  currently is still on Lovenox weight based 80 mg q.12 h. He did receive this morning’s dose according to his nurse.        Past Medical History:   Diagnosis Date   • Cancer     3-4 years ago lymph node radiation    • Disease of thyroid gland    • Hyperlipidemia    • Hypertension    • Prostate cancer 2015    no treatment. observation by Dr Yarbrough   • Prostatic hypertrophy     followed by Dr Yarbrough   • Stroke      Past Surgical History:   Procedure Laterality Date   • JOINT REPLACEMENT      left hip       HEMATOLOGIC/ONCOLOGIC HISTORY:  (History from previous dates can be found in the separate document.)    MEDICATIONS    Current Facility-Administered Medications:   •  acetaminophen (TYLENOL) tablet 650 mg, 650 mg, Oral, Q4H PRN, Juancarlos Jordan MD  •  amLODIPine (NORVASC) tablet 5 mg, 5 mg, Oral, Q24H, Juancarlos Jordan MD, 5 mg at 05/09/18 1033  •  aspirin EC tablet 81 mg, 81 mg, Oral, Daily, Curtis Madera MD, 81 mg at 05/09/18 1035  •  atorvastatin (LIPITOR) tablet 40 mg, 40 mg, Oral, Nightly, Curtis Madera MD  •  enoxaparin (LOVENOX) syringe 80 mg, 80 mg, Subcutaneous, Q12H, Juancarlos Jordan MD, 80 mg at 05/09/18 1034  •  ferrous sulfate tablet 325 mg, 325 mg, Oral, Daily With Breakfast, Juancarlos Jordan MD, 325 mg at 05/09/18 1033  •  finasteride (PROSCAR) tablet 5 mg, 5 mg, Oral, Daily, Juancarlos Jordan MD, 5 mg at 05/09/18 1034  •  levothyroxine (SYNTHROID, LEVOTHROID) tablet 50 mcg, 50 mcg, Oral, Daily, Juancarlos Jordan MD, 50 mcg at 05/09/18 1034  •  sodium chloride 0.9 % flush 1-10 mL, 1-10 mL, Intravenous, PRN, Juancarlos Jordan MD  •  Insert peripheral IV, , , Once **AND** sodium chloride 0.9 % flush 10 mL, 10 mL, Intravenous, PRN, Vitor Diaz MD    ALLERGIES:   No Known Allergies    SOCIAL HISTORY:       Social History     Social History   • Marital status:      Spouse name: Katelyn   • Number of children: N/A   • Years of education: N/A     Occupational History   • Not on file.     Social History Main  Topics   • Smoking status: Current Every Day Smoker     Packs/day: 1.00   • Smokeless tobacco: Never Used      Comment: Discussed with patient 5-7 min. [employer]uit [employer] [place] information provided    • Alcohol use No   • Drug use: No   • Sexual activity: Not Currently     Other Topics Concern   • Not on file     Social History Narrative   • No narrative on file         FAMILY HISTORY:  History reviewed. No pertinent family history.    REVIEW OF SYSTEMS:  Review of Systems   Constitutional: Positive for fatigue. Negative for chills, fever and unexpected weight change.   HENT: Negative for congestion, facial swelling, rhinorrhea and sinus pressure.    Eyes: Negative for visual disturbance.   Respiratory: Negative for cough and shortness of breath.    Cardiovascular: Negative for chest pain and leg swelling.   Gastrointestinal: Negative for abdominal pain, blood in stool, constipation, nausea and vomiting.   Genitourinary: Positive for hematuria. Negative for dysuria.   Musculoskeletal: Negative for arthralgias and joint swelling.   Neurological: Positive for headaches.        See current history for detailed description of neuro symptoms   Hematological: Negative for adenopathy. Bruises/bleeds easily.   Psychiatric/Behavioral: Negative for confusion.              Vitals:    05/08/18 2334 05/09/18 0533 05/09/18 0716 05/09/18 1311   BP: 140/73  160/83 168/85   BP Location: Right arm  Right arm Right arm   Patient Position: Lying  Lying Lying   Pulse: 74  77 88   Resp: 20  18 18   Temp: 98 °F (36.7 °C)  98.5 °F (36.9 °C) 97.6 °F (36.4 °C)   TempSrc: Oral  Oral Oral   SpO2: 95%      Weight:  76.2 kg (167 lb 15.9 oz)     Height:         No flowsheet data found.   PHYSICAL EXAM:      CONSTITUTIONAL:  Well-developed well-nourished male, lying on bed and not any acute distress.  EYES:  Conjunctiva and lids unremarkable.  PERRLA  EARS,NOSE,MOUTH,THROAT:  Ears and nose appear unremarkable.  Lips, teeth, gums appear  unremarkable.  RESPIRATORY:  Normal respiratory effort.  Lungs clear to auscultation bilaterally.  CARDIOVASCULAR:  Normal S1, S2.  No murmurs rubs or gallops.  No significant lower extremity edema.  GASTROINTESTINAL: Abdomen appears unremarkable.  Nontender.  No hepatomegaly.  No splenomegaly.  Bowel sounds present.    LYMPHATIC:  No cervical, supraclavicular, axillary lymphadenopathy.  MUSCULOSKELETAL:  No edema no cyanosis.    SKIN:  Warm.  No rashes.  Has bruises on his arms secondary to intravenous needle access.  PSYCHIATRIC:  Normal judgment and insight.  Normal mood and affect.      RECENT LABS:        WBC   Date Value Ref Range Status   05/09/2018 10.57 4.50 - 10.70 10*3/mm3 Final   05/08/2018 14.80 (H) 4.50 - 10.70 10*3/mm3 Final     Hemoglobin   Date Value Ref Range Status   05/09/2018 8.7 (L) 13.7 - 17.6 g/dL Final   05/08/2018 10.1 (L) 13.7 - 17.6 g/dL Final     Platelets   Date Value Ref Range Status   05/09/2018 260 140 - 500 10*3/mm3 Final   05/08/2018 292 140 - 500 10*3/mm3 Final     Lab Results   Component Value Date    [id number] 507 04/12/2018     Lab Results   Component Value Date    FOLATE 8.45 04/30/2018     Lab Results   Component Value Date    IRON 24 (L) 04/30/2018    TIBC 171 04/30/2018    FERRITIN 507.20 (H) 04/30/2018       Results from last 7 days  Lab Units 05/09/18  0344 05/08/18  1858   SODIUM mmol/L 137 136   POTASSIUM mmol/L 3.6 4.0   CHLORIDE mmol/L 99 94*   CO2 mmol/L 28.8 26.9   BUN mg/dL 20 21   CREATININE mg/dL 1.48* 1.51*   CALCIUM mg/dL 8.1* 8.2*   BILIRUBIN mg/dL  --  0.3   ALK PHOS U/L  --  134*   ALT (SGPT) U/L  --  57*   AST (SGOT) U/L  --  41*   GLUCOSE mg/dL 82 95       Results from last 7 days  Lab Units 05/08/18  1858   INR  1.14*     IMAGE STUDY:  1.  MRI OF THE BRAIN WITHOUT CONTRAST     CONCLUSION:  New left-sided weakness. Expressive aphasia.     TECHNIQUE: MRI of the brain was obtained with sagittal T1, axial T1,  axial FLAIR, axial T2, axial diffusion, and  axial gradient echo images.     COMPARISON: MRI of the brain dated 04/24/2018.     FINDINGS:  On the previous MRI examination of 04/24/2018, there were innumerable  areas of acute to subacute infarction appreciated within both cerebellar  hemispheres, the right caudate body, the occipital lobes, the parietal  lobes, and the frontal lobes. On the current examination, there are foci  of restricted diffusion identified that most likely represent new areas  of acute infarction and these are appreciated within the left cerebellar  hemisphere where the largest of these abnormalities measures up to  approximately 1.1 cm in diameter, the medial portion of the right  occipital lobe where the largest of these abnormalities measures up to  approximately 2.0 x 1.8 cm in greatest axial dimensions, and the medial  parietal lobes where the largest of these measures up to 1.2 cm in  diameter is located within the left parietal lobe. These foci of acute  infarction are probably within the left PICA, left MCA, and bilateral  PCA distributions although foci of acute infarction within the medial  portions of the parietal lobes could potentially be within the DONN  distributions and the abnormality within the right parietal lobe could  potentially be within either the right DONN, MCA, or PCA distribution.  Additionally, there are other areas of more subtle diffusion-weighted  hyperintensity which were not definitively appreciated on the prior  study. These are seen within both superior parietal lobes, the lateral  aspect of the right parietal lobe, and the lateral aspect of the right  frontal lobe where relatively punctate subcentimeter foci of more subtle  diffusion-weighted hyperintensity is seen and there are some new areas  of FLAIR hyperintensity. These findings are also suspicious for new more  subacute infarcts that have developed in the interval since the prior  examination. The gradient echo sequence demonstrates subtle areas  of  hemorrhagic transformation within the infarct within the left frontal  lobe, foci of infarction within the right occipital lobe, and foci of  infarction within both cerebellar hemispheres. The foci of hemorrhagic  transformation are subcentimeter in size.     Otherwise, the ventricles, sulci, and cisterns are age appropriate. The  major flow related signal voids are within normal limits.     Incidentally noted is some fluid signal intensity within the mastoid air  cells. Mucosal thickening is incidentally appreciated within the  ethmoids.     IMPRESSION:  There are innumerable foci of supratentorial and infratentorial  infarction within multiple vascular distributions as discussed in detail  above. While many of these areas of infarction were evident on the  previous exam, there is evidence for multiple new areas of infarction of  varying ages since the previous study. The gradient echo sequence  demonstrates foci of hemorrhagic transformation both supratentorially  and infratentorially with multiple areas of subcentimeter hemorrhage.     2.  Interpretation Summary venous Doppler study on 5/9/2018    · Acute left lower extremity superficial thrombophlebitis noted in the greater saphenous (above knee) and greater saphenous (below knee).  · Acute left lower extremity deep vein thrombosis noted in the gastrocnemius/soleal.  · All other veins appeared normal bilaterally.  · Right popliteal fossa fluid collection.       3.  CT ANGIOGRAM CHEST W WO CONTRAST-, CT ABDOMEN PELVIS W WO CONTRAST-     INDICATIONS: Septic arterial embolism, bladder neoplasm     TECHNIQUE: Radiation dose reduction techniques were utilized, including  automated exposure control and exposure modulation based on body size.   CT angiography of the chest with three-dimensional reconstructions,  unenhanced and enhanced ABDOMEN AND PELVIS CT     COMPARISON: 4/24/2018, 1/22/2015     FINDINGS:      CTA CHEST:     No pulmonary embolism. No aortic  dissection. Ascending aorta is mildly  dilated, 3.7 cm, not significantly changed. 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      The heart size is normal without pericardial effusion. Small pericardial  recess fluid. Coronary arterial calcification is present. A few  subcentimeter short axis mediastinal lymph nodes are seen that are not  significant by size criteria.     The airways show a circumscribed density dependently in the right  mainstem bronchus, for example 1.0 x 0.5 cm on image 53 of series 2,  change from the prior exam, may represent adherent mucus or mucosal  lesion, follow-up can characterize resolution, bronchoscopy could be  obtained for further evaluation if indicated Small bilateral pleural  effusions, with basilar atelectasis. Assessment of atelectatic lung is  limited, interval follow-up is recommended to exclude any possibility of  underlying lesion.     The lungs show no focal pulmonary consolidation or mass.         CT abdomen pelvis:        Hepatic cysts are noted.     Minimal perisplenic ascites.     Appearance of the kidneys is abnormal. Wedge-shaped region of  hypoenhancement within the left mid kidney, for example 5 cm, image 48  of series 2, may represent an area of ischemia and/or infection,  possibility of underlying lesion not excluded, follow-up recommended.  Hypoattenuation of the distal aspect of the left renal arterial branches  serving as region, for example image 48 of series 2 may reflect  thrombosis or partial thrombosis.     The right kidney is more extensively affected, with thrombus suggested  in the mid right renal artery, image 59 of series 2, hypoattenuation the  distal aspect of the right renal artery serving the mid to lower right  kidney which is conspicuous a hypoenhancing suggesting ischemia. The  upper  aspect of the right kidney which is certainly an early branching  renal artery appears or nearly normal, for example image 84 series 6. 3  to 4 mm nonobstructive left nephrolith. No hydronephrosis. Left renal  cyst.     Diffuse wall thickening of the urinary bladder, presumably related to  stated history of bladder neoplasm. Assessment at the level of the  pelvis is limited by hardware artifact from left hip arthroplasty  hardware, bladder is partly obscured, direct visualization can be  obtained as indicated.     Haziness around the gallbladder may be related to diffuse mesenteric  edema or could be evidence of cholecystitis; if indicated, ultrasound or  hepatobiliary scintigraphy could be considered for further evaluation.        Otherwise unremarkable appearance of the liver, spleen, adrenal glands,  pancreas, kidneys, bladder.     No bowel obstruction or abnormal bowel thickening is identified. Colonic  diverticula are seen that do not appear inflamed.     No free intraperitoneal gas. Mild pelvic free fluid.     Several para-aortic lymph nodes are present, some of which are  borderline prominent, for example 8 mm short axis between aorta and  inferior vena cava on image 81 of series 3, potentially evidence of  metastatic disease. Likewise, left iliac chain lymph node, 1.6 cm on  image 100, right iliac chain lymph node 1.3 cm on image 104, may  represent metastatic disease. Further evaluation with positron emission  tomography could be considered for further evaluation.     Abdominal aorta is not aneurysmal. Aortic and other arterial  calcifications are present.     Degenerative changes are seen in the spine. No acute fracture is  identified. Diffuse subcutaneous edema, compatible with anasarca.       IMPRESSION:        1. Critical test result. Evidence of arterial embolic disease in the  renal arteries, with associated ischemic portions of the kidneys, right  more than left (right kidney is extensively affected  with sparing of the  upper pole; left kidney is predominantly affected at the mid aspect).  Possibility of septic emboli/infectious process in the kidneys is not  excluded. Small nonobstructive left nephrolith.     2.  Diffuse wall thickening in the urinary bladder may relate to  patient's stated history of bladder neoplasm, possible metastatic  retroperitoneal lymph nodes, positron emission tomography could be  considered.  3. No pulmonary embolism. Mild bilateral pleural effusions and basilar  atelectasis, follow-up recommended.  4. Adherent mucus or mucosal lesion in the right mainstem bronchus,  follow-up/further evaluation recommended as indicated.     5. Haziness around the gallbladder may be related to diffuse mesenteric  edema or could be evidence of cholecystitis; if indicated, ultrasound or  hepatobiliary scintigraphy could be considered for further evaluation.     6. Diverticulosis. No focal acute inflammatory process of bowel is  identified, follow up as indications persist. Mild pelvic, perisplenic  Ascites.        4.  Echocardiogram study on 4/27/2018   Interpretation Summary     · There is large mass on the left atrial side of both the anterior and posterior mitral valve leaflets.  · Mild mitral insufficiency  · Large mobile mass on the tricuspid valve leaflet.  · Mild tricuspid insufficiency with mild coronary hypertension RV systolic pressure of 45 mmHg.  · Atherosclerosis of the descending thoracic aorta and arch.  · Since the previous transesophageal echocardiogram the mitral valve mass is larger more diffuse on both leaflets and there is a tricuspid valve mass.         Assessment/Plan     This patient is a 77-year-old male with recent recurrent episodes of arterial thrombosis who presented initially with diffuse distributed stroke and then had disease progress despite was on Eliquis anticoagulation with more severe strokes and was also found having cardiac valve vegetation, digital ischemia in  all toes on both feet and also large renal artery thrombosis; switched to heparin and then Lovenox anticoagulation. However, he had more evidence of acute stroke in wide distribution evidenced by this morning’s MRI examination. Clearly, he failed Lovenox anticoagulation also. I discussed with the patient, his wife and his daughter today that I recommended to switch anticoagulation to Pradaxa. His previous anti-coagulable status workup on 04/27/2018 was all negative for antiphospholipid antibodies including the antibody panel #2; also negative for anti-lupus anticoagulant.     I discussed with them for possible study for factor II and factor V mutation and also protein S and protein C profile. However, I pointed out the study results would not benefit this patient because he will need to be anticoagulated independent of the laboratory study results. His daughter voiced they do not want to pursue further laboratory study.     This patient has suspicious bladder cancer as evidenced on CT examination; however, we will not be able to have tissue biopsy because he continued to have thrombosis despite on anticoagulation so it is not feasible to discontinue his anticoagulation because of the risk of further worsening of thrombosis. So for the time being, we will not have sure diagnosis of his thickening of the bladder but, it fits with his gross hematuria, recurrent, and with the CT pointing to the direction of bladder cancer. Likely he would have high-grade bladder cancer based on the presentation.     2. This patient also has anemia, typically worse compared to 04/2018. Laboratory study showed elevated ferritin and decreased iron saturation fits with anemia due to chronic disease and in this case probably would be the cancer. Of course, he has history of hematuria since the beginning of 04/2018. Laboratory study also reported normal B12 level and folic acid level but could be much improved. So I will start the patient on  oral vitamin B12 and folic acid to help with erythropoiesis. He is currently on oral iron treatment which I think is reasonable with all things considered, especially he had recent hematuria.        PLAN:  1.  Start Pradaxa 150 mg every 12 hours.   2.  Discontinue Lovenox.   3.  Continue aspirin 81 mg daily.   4.  Start vitamin B12 oral 1000 µg daily and folic acid 1 mg daily to help with erythropoiesis.        Thank you for letting us participating in the care of this patient.  We'll follow up with you.       I discussed with my colleague Dr. Caraballo today about management of this case.     ANNELISE TARIQ M.D., Ph.D.     5/9/2018        Dictated using Dragon Dictation.

## 2018-05-09 NOTE — THERAPY EVALUATION
Acute Care - Speech Language Pathology   Swallow Initial Evaluation  [facility]     Patient Name: Jalen Richards  : 1940  MRN: [id number]  Today's Date: 2018               Admit Date: 2018    Visit Dx:     ICD-10-CM ICD-9-CM   1. Cerebrovascular accident (CVA) due to embolism of right middle cerebral artery I63.411 434.11     Patient Active Problem List   Diagnosis   • Basilar artery occlusion with cerebral infarction   • Generalized weakness   • Hypertension   • CHASE (acute kidney injury)   • Pyelonephritis   • BPH (benign prostatic hyperplasia)   • Toe cyanosis   • Proteinuria   • Peripheral artery embolism or thrombosis   • Cardiac mass   • Bladder mass   • Thromboembolism of renal arteries   • Cerebrovascular accident (CVA) due to embolism of right middle cerebral artery   • Hematuria     Past Medical History:   Diagnosis Date   • Cancer     3-4 years ago lymph node radiation    • Disease of thyroid gland    • Hyperlipidemia    • Hypertension    • Prostate cancer 2015    no treatment. observation by Dr Yarbrough   • Prostatic hypertrophy     followed by Dr Yarbrough   • Stroke      Past Surgical History:   Procedure Laterality Date   • JOINT REPLACEMENT      left hip          SWALLOW EVALUATION (last 72 hours)      SLP Adult Swallow Evaluation     Row Name 18 1400          Document Type evaluation  -    Subjective Information complains of   anomia  -    Patient Observations alert;cooperative  -    Patient/Family Observations wife, son-in-law, and daughter present  -    Patient Effort excellent  -          Patient Profile Reviewed yes  -    Pertinent History Of Current Problem Recent D/C from hospital from rehab (bilateral strokes from previous stay). Pt with increased anomia, new infarcts noted. Pt passed RN SS and soft diet resumed. Hx of laryngeal cancer s/p radiation. Pt denies baseline dysphagia.    -    Current Method of Nutrition chopped;soft textures;thin liquids  -     Precautions/Limitations, Hearing hearing impairment, bilaterally  -    Prior Level of Function-Communication other (see comments)   mild anomia  -    Prior Level of Function-Swallowing mechanical soft with no mixed consistencies;thin liquids;other (see comments)   from inpatient evaluation after SHANNON  -    Plans/Goals Discussed with patient;spouse/S.O.;family;agreed upon  -    Barriers to Rehab previous functional deficit;medically complex  -    Patient's Goals for Discharge patient did not state  -    Family Goals for Discharge patient able to return to regular diet  -          Additional Documentation Pain Scale: Numbers Pre/Post-Treatment (Group)  -          Pain Scale: Numbers, Pretreatment 0/10 - no pain  -    Pain Scale: Numbers, Post-Treatment 0/10 - no pain  -          Dentition Assessment missing teeth;other (see comments)   PARTIALS NOT PRESENT  -    Secretion Management WNL/WFL  -    Mucosal Quality moist, healthy  -    Volitional Swallow WFL  -    Volitional Cough WFL  -          Oral Motor General Assessment WFL  -    Oral Motor, Comment Harsh voice  -          Oral Prep Phase impaired  -    Oral Transit impaired  -    Oral Residue WFL  -    Pharyngeal Phase no overt signs/symptoms of pharyngeal impairment  -    Clinical Swallow Evaluation Summary Pt seen for bedside swallow this date. Family present and reported patient tolerated a hamburger last night without overt s/s of asp. Partials not present. Voice harsh. Difficult to rate for voice change. Slight swallow delay vs oral holding with thins. Delayed throat clear x1 with thins via straw. Do not suspect related to swallow function at this time d/t baseline throat clearing. Adequate bolus propulsion with puree. Adequate mastication with mixed mech soft. No overt s/s of asp this date despite thin liquid component. Prolonged mastication with regular. Pt required liquid wash to initiate a swallow. Pt without  partials and hx of dry mouth 2/2 radiation treatment. Pt reports typically requiring a liquid wash as observed by SLP when question if this was a baseline behavior. SLP recs upgrade to regular d/t reports of poor intake on modified diet and observed tolerance of regular with strategies this date. Family will assist with meals.    -          SLP Swallowing Diagnosis mild;oral dysfunction  -    Functional Impact risk of aspiration/pneumonia;risk of malnutrition  -    Rehab Potential/Prognosis, Swallowing good, to achieve stated therapy goals  -    Criteria for Skilled Therapeutic Interventions Met demonstrates skilled criteria  -          Therapy Frequency (Swallow) PRN  -    Predicted Duration Therapy Intervention (Days) until discharge  -    SLP Diet Recommendation regular textures;thin liquids  -    Recommended Diagnostics reassess via clinical swallow evaluation  -    Recommended Precautions and Strategies upright posture during/after eating;small bites of food and sips of liquid;multiple swallows per bite of food;other (see comments)   liquid wash, assist with meals  -    SLP Rec. for Method of Medication Administration meds whole;meds crushed;with thin liquids;with pudding or applesauce;as tolerated  -    Monitor for Signs of Aspiration yes;notify SLP if any concerns  -    Anticipated Dischage Disposition [facility] nursing facility  -          Oral Nutrition/Hydration Goal Selection (SLP) oral nutrition/hydration, SLP goal 1  -          Oral Nutrition/Hydration Goal 1, SLP Pt will tolerate regular and thins without overt s/s of asp.   -      User Key  (r) = Recorded By, (t) = Taken By, (c) = Cosigned By    Initials Name Effective Dates     Nell Finney MS CCC-SLP 03/07/18 -         EDUCATION  The patient has been educated in the following areas:   Dysphagia (Swallowing Impairment).    SLP Recommendation and Plan  SLP Swallowing Diagnosis: mild, oral dysfunction  SLP Diet  Recommendation: regular textures, thin liquids  Recommended Precautions and Strategies: upright posture during/after eating, small bites of food and sips of liquid, multiple swallows per bite of food, other (see comments) (liquid wash, assist with meals)     Monitor for Signs of Aspiration: yes, notify SLP if any concerns  Recommended Diagnostics: reassess via clinical swallow evaluation  Criteria for Skilled Therapeutic Interventions Met: demonstrates skilled criteria  Anticipated Dischage Disposition: skilled nursing facility  Rehab Potential/Prognosis, Swallowing: good, to achieve stated therapy goals  Therapy Frequency (Swallow): PRN  Predicted Duration Therapy Intervention (Days): until discharge       Plan of Care Reviewed With: patient  Plan of Care Review  Plan of Care Reviewed With: patient  Progress: improving  Outcome Summary: SLP recs upgrade to regular, continue thins. Prolonged mastication d/t dry mouth and missing partials. Assistance with meals.           SLP GOALS     Row Name 05/09/18 1400             Oral Nutrition/Hydration Goal 1 (SLP)    Oral Nutrition/Hydration Goal 1, SLP Pt will tolerate regular and thins without overt s/s of asp.   -        User Key  (r) = Recorded By, (t) = Taken By, (c) = Cosigned By    Initials Name Provider Type    ROBIN Finney MS CCC-SLP Speech and Language Pathologist             SLP Outcome Measures (last 72 hours)      SLP Outcome Measures     Row Name 05/09/18 1500             SLP Outcome Measures    Outcome Measure Used? Adult NOMS  -         FCM Scores    FCM Chosen Swallowing  -      Swallowing FCM Score 6  -        User Key  (r) = Recorded By, (t) = Taken By, (c) = Cosigned By    Initials Name Effective Dates    ROBIN Finney MS CCC-SLP 03/07/18 -            Time Calculation:         Time Calculation- SLP     Row Name 05/09/18 1525 05/09/18 1117 05/09/18 0946       Time Calculation- SLP    SLP Start Time 1400  -  --  --    SLP Received On  05/09/18  -  -- 05/09/18  -    SLP - Next Appointment  -- --   PRN  -SH  --    Row Name 05/09/18 0944             Time Calculation- SLP    SLP - Next Appointment --   PRN  -SH        User Key  (r) = Recorded By, (t) = Taken By, (c) = Cosigned By    Initials Name Provider Type     Nell Finney MS CCC-SLP Speech and Language Pathologist          Therapy Charges for Today     Code Description Service Date Service Provider Modifiers Qty    [id number]  ST EVAL ORAL PHARYNG SWALLOW 4 5/9/2018 Nell Finney MS CCC-SLP GN 1               Nell Finney MS CCC-AMANDA  5/9/2018

## 2018-05-09 NOTE — DISCHARGE PLACEMENT REQUEST
"Jalen Stephenson (77 y.o. Male)     Date of Birth Social Security Number Address Home Phone MRN    1940  [id number] [place] [id number] [phone] [id number]    Syn[employer] Marital Status          None        Admission Date Admission Type Admitting Provider Attending Provider Department, Room/Bed    5/8/18 Emergency Juancarlos Jordan MD [place], Randy Nogueira MD [address], 503/1    Discharge Date Discharge Disposition Discharge Destination                       Attending Provider:  Randy Rivas MD    Allergies:  No Known Allergies    Isolation:  None   Infection:  None   Code Status:  FULL    Ht:  180.3 cm (71\")   Wt:  76.2 kg (167 lb 15.9 oz)    Admission Cmt:  None   Principal Problem:  Cerebrovascular accident (CVA) due to embolism of right middle cerebral artery [I63.411]                 Active Insurance as of 5/8/2018     Primary Coverage     Payor Plan Insurance Group Employer/Plan Group    HUMANA MEDICARE REPLACEMENT HUMANA MEDICARE REPL [id number]     Payor Plan Address Payor Plan Phone Number Effective From Effective To    [address] [address] [phone] 1/1/2018     [place] [address]       Subscriber Name Subscriber Birth Date Member ID       JALEN STEPHENSON 1940 [id number]                 Emergency Contacts      (Rel.) Home Phone Work Phone Mobile Phone    Grace Nevarez (Daughter) -- -- [phone]    RoqueBaljit (Son) [phone] -- --    Katelyn Stephenson (Spouse) [phone] -- --              "

## 2018-05-09 NOTE — SIGNIFICANT NOTE
05/09/18 0945   Rehab Time/Intention   Evaluation Not Performed (Pt JULISA for MRI. Pt passed RN SS x2 and is on soft diet. SLP to follow up as time permits.)   Rehab Treatment   Discipline speech language pathologist

## 2018-05-09 NOTE — SIGNIFICANT NOTE
05/09/18 1332   Rehab Time/Intention   Evaluation Not Performed other (see comments)  (Pt with new DVT. Will follow up tomorrow )   Rehab Treatment   Discipline occupational therapist

## 2018-05-09 NOTE — PROGRESS NOTES
Discharge Planning Assessment   [facility]     Patient Name: Jalen Richards  MRN: [id number]  Today's Date: 5/9/2018    Admit Date: 5/8/2018          Discharge Needs Assessment     Row Name 05/09/18 1538       Living Environment    Lives With spouse;other (see comments)    Current Living Arrangements home/apartment/condo;other (see comments)    Provides Primary Care For no one, unable/limited ability to care for self       Transition Planning    Patient/Family Anticipates Transition to home with family;other (see comments)    Patient/Family Anticipated Services at Transition     Transportation Anticipated family or friend will provide       Discharge Needs Assessment    Equipment Currently Used at Home walker, standard    Anticipated Changes Related to Illness inability to care for self            Discharge Plan     Row Name 05/09/18 1533       Plan    Plan Pt/ family is not sure if pt can return home or will need to return to [facility].     Patient/Family in Agreement with Plan yes    Plan Comments Spoke with pt and family (daughter Grace [phone]) other family also present.  Pt had been @ [facility] for skilled care.  Family is not sure they want pt to return.  If pt is able when ready for dc, then they would like to take pt home.  Will wait for pt to get off of bedrest and work with PT.  Pt will need precert if he returns to [facility].  CCP will follow        Destination     Service Request Status Selected Specialties Address Phone Number Fax Number    [facility] Pending - Request Sent N/A [address] [facility] [address] [phone]      Durable Medical Equipment     No service coordination in this encounter.      Dialysis/Infusion     No service coordination in this encounter.      Home Medical Care     No service coordination in this encounter.      Social Care     No service coordination in this encounter.                Demographic Summary    No  documentation.           Functional Status    No documentation.           Psychosocial    No documentation.           Abuse/Neglect    No documentation.           Legal    No documentation.           Substance Abuse    No documentation.           Patient Forms     Row Name 05/09/18 1544       Patient Forms    Important Message from Medicare ([facility]) --   [facility] on 5/8          Geetha Mandujano RN

## 2018-05-09 NOTE — SIGNIFICANT NOTE
05/09/18 0853   Rehab Time/Intention   Evaluation Not Performed other (see comments)  (Noted LE doppler ordered. Await doppler)   Rehab Treatment   Discipline occupational therapist

## 2018-05-09 NOTE — CONSULTS
Patient Name: Jalen Richards  :1940  77 y.o.    Date of Admission: 2018  Date of Consultation:  18  Encounter Provider: TJ Garcia  Place of Service: [facility] CARDIOLOGY  Referring Provider: Juancarlos Jordan MD  Patient Care Team:  Geovany Saba MD as PCP - General (Emergency Medicine)  Jacob Mtz MD as Consulting Physician (Hematology and Oncology)      Chief complaint: stroke    History of Present Illness: Mr. Richards is a 77 year old male, followed by Dr. Cyrus Renteria, who was originally evaluated in early April when he was diagnosed with acute infarcts in both anterior and posterior circulation. 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 He was discharged on Eliquis with the intent on eventually changing to dual antiplatelet therapy as there was no strong reason to use this long term at that time.     He presented to the emergency room on  with weakness and gross hematuria. Eliquis was held. On  he was noted to have arterial embolization to both hands and feet. He had a cystoscopy on  that showed post bladder wall lesion, in which most instances would require resection, however due to embolic issues and needing anticoagulation, the was deferred. On  he underwent repeat SHANNON which had changed significantly since prior SHANNON> The mitral valve lesion had increased in size and involved both leaflets as well as tricuspid valve involvement. Blood cultures were negative. Hem/onc was consulted for hypercoagulable eval. He was not a CV surgical candidate due  to possible malignancy. He was discharged on lovenox as this was essentially the only option (given the development of the valvular lesions while on apixaban).  A transthoracic echo was repeated on 5/1 for survellince and the TV lesion was barely visible and the MV lesion was not well visualized as well. He was discharged on 5/1 to rehab.     He presented back to the emergency room yesterday with worsening left sided weakness and difficulty talking. Neurologic imaging confirmed mulitple new embolic strokes of varying ages since the prior exam. No missed doses of lovenox were noted.     Patient is currently up and walking to the bathroom. He has an altered unsteady gait. His family reports his speech has improved but is not at baseline. He denies any chest pain, shortness of breath, fevers, chills, cough.     Past Medical History:   Diagnosis Date   • Cancer     3-4 years ago lymph node radiation    • Disease of thyroid gland    • Hyperlipidemia    • Hypertension    • Prostate cancer 2015    no treatment. observation by Dr Yarbrough   • Prostatic hypertrophy     followed by Dr Yarbrough   • Stroke        Past Surgical History:   Procedure Laterality Date   • JOINT REPLACEMENT      left hip         Prior to Admission medications    Medication Sig Start Date End Date Taking? Authorizing Provider   acetaminophen (TYLENOL) 325 MG tablet Take 2 tablets by mouth Every 4 (Four) Hours As Needed for Mild Pain . 5/1/18  Yes Randy Rivas MD   amLODIPine (NORVASC) 10 MG tablet Take 0.5 tablets by mouth Daily. 5/1/18  Yes Randy Rivas MD   atorvastatin (LIPITOR) 20 MG tablet Take 20 mg by mouth Daily.   Yes Historical Provider, MD   enoxaparin (LOVENOX) 80 MG/0.8ML solution syringe Inject 0.8 mL under the skin Every 12 (Twelve) Hours. 4/30/18  Yes Jacob Mtz MD   ferrous sulfate 325 (65 FE) MG tablet Take 325 mg by mouth Daily With Breakfast.   Yes Historical Provider, MD   finasteride (PROSCAR) 5 MG tablet Take 1  tablet by mouth Daily. 4/16/18  Yes Miquel Gutierrez MD   hydrochlorothiazide (HYDRODIURIL) 25 MG tablet Take 25 mg by mouth Daily.   Yes Historical Provider, MD   levothyroxine (SYNTHROID, LEVOTHROID) 50 MCG tablet Take 50 mcg by mouth Daily.   Yes Historical Provider, MD       No Known Allergies    Social History     Social History   • Marital status:      Spouse name: Katelyn     Social History Main Topics   • Smoking status: Current Every Day Smoker     Packs/day: 1.00   • Smokeless tobacco: Never Used      Comment: Discussed with patient 5-7 min. [employer] [place] information provided    • Alcohol use No   • Drug use: No   • Sexual activity: Not Currently     Other Topics Concern   • Not on file       History reviewed. No pertinent family history.    REVIEW OF SYSTEMS:   All systems reviewed.  Pertinent positives identified in HPI.  All other systems are negative.      Objective:     Vitals:    05/08/18 2334 05/09/18 0533 05/09/18 0716 05/09/18 1311   BP: 140/73  160/83 168/85   BP Location: Right arm  Right arm Right arm   Patient Position: Lying  Lying Lying   Pulse: 74  77 88   Resp: 20  18 18   Temp: 98 °F (36.7 °C)  98.5 °F (36.9 °C) 97.6 °F (36.4 °C)   TempSrc: Oral  Oral Oral   SpO2: 95%      Weight:  76.2 kg (167 lb 15.9 oz)     Height:         Body mass index is 23.43 kg/m².    Physical Exam:  Constitutional: He is oriented to person, place, and time. He appears well-developed. He does not appear ill.   HENT:   Head: Normocephalic and atraumatic. Head is without contusion.   Right Ear: Hearing normal. No drainage.   Left Ear: Hearing normal. No drainage.   Nose: No nasal deformity. No epistaxis.   Eyes: Lids are normal.   Neck: No JVD present. Carotid bruit is not present. No tracheal deviation present. Cardiovascular: Normal rate, regular rhythm and +murmur  Pulses:       Posterior tibial pulses are 2+ on the right side, and 2+ on the left side.   Pulmonary/Chest: Effort normal and breath sounds  normal.   Abdominal: Soft. Normal appearance and bowel sounds are normal. There is no tenderness.   Musculoskeletal: altered unsteady gait, left sided weakness       Neurological: He is alert and oriented to person, place, and time.   Skin: Skin is warm, dry and intact. No rash noted.   Psychiatric: his affect is flat. His behavior is normal.   Vitals reviewed      Lab Review:       Results from last 7 days  Lab Units 05/09/18  0344 05/08/18  1858   SODIUM mmol/L 137 136   POTASSIUM mmol/L 3.6 4.0   CHLORIDE mmol/L 99 94*   CO2 mmol/L 28.8 26.9   BUN mg/dL 20 21   CREATININE mg/dL 1.48* 1.51*   CALCIUM mg/dL 8.1* 8.2*   BILIRUBIN mg/dL  --  0.3   ALK PHOS U/L  --  134*   ALT (SGPT) U/L  --  57*   AST (SGOT) U/L  --  41*   GLUCOSE mg/dL 82 95           Results from last 7 days  Lab Units 05/09/18  0344   WBC 10*3/mm3 10.57   HEMOGLOBIN g/dL 8.7*   HEMATOCRIT % 26.7*   PLATELETS 10*3/mm3 260       Results from last 7 days  Lab Units 05/08/18  1858   INR  1.14*           Results from last 7 days  Lab Units 05/09/18  0344   CHOLESTEROL mg/dL 129   TRIGLYCERIDES mg/dL 94   HDL CHOL mg/dL 49   LDL CHOL mg/dL 61               I personally viewed and interpreted the patient's EKG/Telemetry data.      Current Facility-Administered Medications:   •  acetaminophen (TYLENOL) tablet 650 mg, 650 mg, Oral, Q4H PRN, Juancarlos Jordan MD  •  amLODIPine (NORVASC) tablet 5 mg, 5 mg, Oral, Q24H, Juancarlos Jordan MD, 5 mg at 05/09/18 1033  •  aspirin EC tablet 81 mg, 81 mg, Oral, Daily, Curtis Madera MD, 81 mg at 05/09/18 1035  •  atorvastatin (LIPITOR) tablet 40 mg, 40 mg, Oral, Nightly, Curtis Madera MD  •  enoxaparin (LOVENOX) syringe 80 mg, 80 mg, Subcutaneous, Q12H, Juancarlos Jordan MD, 80 mg at 05/09/18 1034  •  ferrous sulfate tablet 325 mg, 325 mg, Oral, Daily With Breakfast, Juancarlos Jordan MD, 325 mg at 05/09/18 1033  •  finasteride (PROSCAR) tablet 5 mg, 5 mg, Oral, Daily, Juancarlos Jordan MD, 5 mg at 05/09/18 1034  •   levothyroxine (SYNTHROID, LEVOTHROID) tablet 50 mcg, 50 mcg, Oral, Daily, Juancarlos Jordan MD, 50 mcg at 05/09/18 1034  •  sodium chloride 0.9 % flush 1-10 mL, 1-10 mL, Intravenous, PRN, Juancarlos Jordan MD  •  Insert peripheral IV, , , Once **AND** sodium chloride 0.9 % flush 10 mL, 10 mL, Intravenous, PRN, Vitor Diaz MD    Assessment and Plan:   1. Recurrent embolic CVA despite anticoagulation with enoxaparin (failed apixaban previously) - due to rapidly progressing MV and TV lesions seen on SHANNON (4/26).  Neurology added ASA. Unfortunately not much to offer. Will repeat limited transthoracic echocardiogram to eval valvular lesions.     2. Recent peripheral artery thrombosis, now with new deep vein thrombosis - suspicious for hypercoagulability 2/2 malignancy. Hem/onc has been consulted    3. Bladder mass - unable to perform TURBT for definitive diagnosis due to embolic issues and increased bleeding risk on anticoagulation.     4. HTN - on amlodipine. BP has been a little elevated. Will follow.     5. History of prostate cancer.     Sybil Hurtado, APRN  05/09/18  3:03 PM

## 2018-05-09 NOTE — PLAN OF CARE
Problem: Patient Care Overview  Goal: Plan of Care Review  Outcome: Ongoing (interventions implemented as appropriate)   05/09/18 0608   Coping/Psychosocial   Plan of Care Reviewed With patient   OTHER   Outcome Summary patient admitted from rehab facility for L side weakness. NIH=4. neurology, hematology, urology consulted to see today. MRI and doppler of lower extremeties to be completed. am labs. NS @100ml/hr. no falls. passed bedside swallow.. resting quietly overnight. ctm closely      Goal: Individualization and Mutuality  Outcome: Ongoing (interventions implemented as appropriate)    Goal: Discharge Needs Assessment  Outcome: Ongoing (interventions implemented as appropriate)

## 2018-05-09 NOTE — PROGRESS NOTES
Bilateral lower extremity venous doppler completed, Prelim Rt neg for DVT, Lt positive for new calf vein and superficial vein thrombus given to STEPAN Gottlieb.

## 2018-05-09 NOTE — PLAN OF CARE
Problem: Patient Care Overview  Goal: Plan of Care Review   05/09/18 1513   Coping/Psychosocial   Plan of Care Reviewed With patient   OTHER   Outcome Summary SLP recs upgrade to regular, continue thins. Prolonged mastication d/t dry mouth and missing partials. Assistance with meals.    Plan of Care Review   Progress improving

## 2018-05-09 NOTE — H&P
HISTORY AND PHYSICAL   [facility]        Patient Identification:  Name: Jalen Richards  Age: 77 y.o.  Sex: male  :  1940  MRN: [id number]                     Primary Care Physician: Geovany Saba MD    Chief Complaint:  Left sided weakness and speech problems    History of Present Illness:         The patient is a 77-year-old white male with history of cancer, hypothyroidism, hypertension, hyperlipidemia, prostate cancer, hematuria and history of recent multiple strokes and emboli to other organs who is admitted from the nursing facility with history of having some worsening neurologic symptoms over the past for 5 days with worsening left-sided weakness and problems with expressive aphasia.  He's not had any nausea or vomiting.  He's not had any headaches.  He denies having any chest pain or shortness of air.  The patient had failed on Eliquis and subsequently was discharged on Lovenox the last admission.  He been seen by oncology and had some labs to evaluate for possible hypercoagulable condition.  The patient continues to have hematuria and CT scanning showed some adenopathy and possible abnormality of bladder last admission.  The patient was evaluated in the ER and CT of head did not show any acute changes.  He was given some aspirin and admitted for further evaluation treatment of his symptoms.    Past Medical History:  Past Medical History:   Diagnosis Date   • Cancer     3-4 years ago lymph node radiation    • Disease of thyroid gland    • Hyperlipidemia    • Hypertension    • Prostate cancer     no treatment. observation by Dr Yarbrough   • Prostatic hypertrophy     followed by Dr Yarbrough   • Stroke      Past Surgical History:  Past Surgical History:   Procedure Laterality Date   • JOINT REPLACEMENT      left hip      Home Meds:  Prescriptions Prior to Admission   Medication Sig Dispense Refill Last Dose   • acetaminophen (TYLENOL) 325 MG tablet Take 2 tablets by mouth  Every 4 (Four) Hours As Needed for Mild Pain .      • amLODIPine (NORVASC) 10 MG tablet Take 0.5 tablets by mouth Daily. 30 tablet 0    • atorvastatin (LIPITOR) 20 MG tablet Take 20 mg by mouth Daily.   4/23/2018 at 0900   • enoxaparin (LOVENOX) 80 MG/0.8ML solution syringe Inject 0.8 mL under the skin Every 12 (Twelve) Hours. 60 syringe 1    • ferrous sulfate 325 (65 FE) MG tablet Take 325 mg by mouth Daily With Breakfast.      • finasteride (PROSCAR) 5 MG tablet Take 1 tablet by mouth Daily. 30 tablet 0 4/23/2018 at 0900   • hydrochlorothiazide (HYDRODIURIL) 25 MG tablet Take 25 mg by mouth Daily.      • levothyroxine (SYNTHROID, LEVOTHROID) 50 MCG tablet Take 50 mcg by mouth Daily.   4/23/2018 at 0900       Allergies:  No Known Allergies  Immunizations:    There is no immunization history on file for this patient.  Social History:   Social History     Social History Narrative   • No narrative on file     Social History     Social History   • Marital status:      Spouse name: Katelyn   • Number of children: N/A   • Years of education: N/A     Occupational History   • Not on file.     Social History Main Topics   • Smoking status: Current Every Day Smoker     Packs/day: 1.00   • Smokeless tobacco: Never Used      Comment: Discussed with patient 5-7 min. Quit [employer] [place] information provided    • Alcohol use No   • Drug use: No   • Sexual activity: Not Currently     Other Topics Concern   • Not on file     Social History Narrative   • No narrative on file       Family History:  History reviewed. No pertinent family history.     Review of Systems  See history of present illness and past medical history.  Patient denies headache, dizziness, syncope, falls, trauma, change in vision, change in hearing, change in taste, changes in weight, changes in appetite,  numbness, or paresthesia.  Patient denies chest pain, palpitations, dyspnea, orthopnea, PND, cough, sinus pressure, rhinorrhea, epistaxis, hemoptysis, nausea,  "vomiting,hematemesis, diarrhea, constipation or hematchezia.  Denies cold or heat intolerance, polydipsia, polyuria, polyphagia. Denies pyuria, dysuria, hesitancy, frequency or urgency.  Denies fever, chills, sweats, night sweats.   Remainder of ROS is negative.    Objective:  tMax 24 hrs: Temp (24hrs), Av.1 °F (36.7 °C), Min:97.9 °F (36.6 °C), Max:98.4 °F (36.9 °C)    Vitals Ranges:   Temp:  [97.9 °F (36.6 °C)-98.4 °F (36.9 °C)] 98 °F (36.7 °C)  Heart Rate:  [74-96] 74  Resp:  [18-22] 20  BP: (140-170)/(73-91) 140/73      Exam:  /73 (BP Location: Right arm, Patient Position: Lying)   Pulse 74   Temp 98 °F (36.7 °C) (Oral)   Resp 20   Ht 180.3 cm (71\")   Wt 74.4 kg (164 lb 0.4 oz)   SpO2 95%   BMI 22.88 kg/m²     General Appearance:    Alert, cooperative, no distress, appears stated age   Head:    Normocephalic, without obvious abnormality, atraumatic   Eyes:    PERRL, conjunctiva/corneas clear, EOM's intact, both eyes   Ears:    Normal external ear canals, both ears   Nose:   Nares normal, septum midline, mucosa normal, no drainage    or sinus tenderness   Throat:   Lips, mucosa, and tongue normal   Neck:   Supple, symmetrical, trachea midline, no adenopathy;     thyroid:  no enlargement/tenderness/nodules; no carotid    bruit or JVD   Back:     Symmetric, no curvature, ROM normal, no CVA tenderness   Lungs:     Clear to auscultation bilaterally, respirations unlabored   Chest Wall:    No tenderness or deformity    Heart:    Regular rate and rhythm, S1 and S2 normal, no murmur, rub   or gallop   Abdomen:     Soft, non-tender, bowel sounds active all four quadrants,     no masses, no hepatomegaly, no splenomegaly   Extremities:   Extremities normal, atraumatic, no cyanosis ,Some leg and pedal edema   Pulses:   2+ and symmetric all extremities   Skin:   Skin color, texture, turgor normal, no rashes or lesions   Lymph nodes:   Cervical, supraclavicular, and axillary nodes normal   Neurologic:   " CNII-XII intact, Some left-sided weakness and expressive aphasia       .    Data Review:  Lab Results (last 72 hours)     Procedure Component Value Units Date/Time    Comprehensive Metabolic Panel [[id number]]  (Abnormal) Collected:  05/08/18 1858    Specimen:  Blood Updated:  05/08/18 1941     Glucose 95 mg/dL      BUN 21 mg/dL      Creatinine 1.51 (H) mg/dL      Sodium 136 mmol/L      Potassium 4.0 mmol/L      Chloride 94 (L) mmol/L      CO2 26.9 mmol/L      Calcium 8.2 (L) mg/dL      Total Protein 6.5 g/dL      Albumin 3.50 g/dL      ALT (SGPT) 57 (H) U/L      AST (SGOT) 41 (H) U/L      Alkaline Phosphatase 134 (H) U/L      Total Bilirubin 0.3 mg/dL      eGFR Non African Amer 45 (L) mL/min/1.73      Globulin 3.0 gm/dL      A/G Ratio 1.2 g/dL      BUN/Creatinine Ratio 13.9     Anion Gap 15.1 mmol/L     Narrative:       The MDRD GFR formula is only valid for adults with stable renal function between ages 18 and 70.    Protime-INR [[id number]]  (Abnormal) Collected:  05/08/18 1858    Specimen:  Blood Updated:  05/08/18 1919     Protime 14.4 (H) Seconds      INR 1.14 (H)    CBC & Differential [[id number]] Collected:  05/08/18 1858    Specimen:  Blood Updated:  05/08/18 1910    Narrative:       The following orders were created for panel order CBC & Differential.  Procedure                               Abnormality         Status                     ---------                               -----------         ------                     CBC Auto Differential[[id number]]        Abnormal            Final result                 Please view results for these tests on the individual orders.    CBC Auto Differential [[id number]]  (Abnormal) Collected:  05/08/18 1858    Specimen:  Blood Updated:  05/08/18 1910     WBC 14.80 (H) 10*3/mm3      RBC 3.35 (L) 10*6/mm3      Hemoglobin 10.1 (L) g/dL      Hematocrit 31.6 (L) %      MCV 94.3 fL      MCH 30.1 pg      MCHC 32.0 (L) g/dL      RDW 13.1 %      RDW-SD 44.2 fl      MPV 8.9 fL       Platelets 292 10*3/mm3      Neutrophil % 88.3 (H) %      Lymphocyte % 8.3 (L) %      Monocyte % 2.3 (L) %      Eosinophil % 0.3 %      Basophil % 0.3 %      Immature Grans % 0.5 %      Neutrophils, Absolute 13.07 (H) 10*3/mm3      Lymphocytes, Absolute 1.23 10*3/mm3      Monocytes, Absolute 0.34 10*3/mm3      Eosinophils, Absolute 0.04 10*3/mm3      Basophils, Absolute 0.04 10*3/mm3      Immature Grans, Absolute 0.08 (H) 10*3/mm3                 Results from last 7 days  Lab Units 05/09/18  0344   HEMOGLOBIN A1C % 5.10      Imaging Results (all)     Procedure Component Value Units Date/Time    CT Head Without Contrast [[id number]] Collected:  05/08/18 1934     Updated:  05/08/18 1939    Narrative:       CT HEAD WO CONTRAST-     INDICATIONS: Weakness     TECHNIQUE: Radiation dose reduction techniques were utilized, including  automated exposure control and exposure modulation based on body size.      Noncontrast head CT     COMPARISON: 4/23/2018     FINDINGS:      Chronic left frontal lobe parenchymal calcification, bilateral  occipital encephalomalacia/old infarct change.     No acute intracranial hemorrhage, midline shift or mass effect. No acute  territorial infarct is identified. Thinning of the pituitary is noted.     Mild periventricular hypodensities suggest chronic small vessel ischemic  change in a patient this age.     Arterial calcifications are seen at the base of the brain.     Ventricles, cisterns, cerebral sulci are unremarkable for patient age.     The visualized paranasal sinuses, orbits, mastoid air cells are  unremarkable.                   Impression:              No acute intracranial hemorrhage or hydrocephalus. Chronic changes  of the brain. If there is further clinical concern, MRI could be  considered for further evaluation.     This report was finalized on 5/8/2018 7:36 PM by Dr. Jarad Kothari M.D.           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   • Cerebrovascular accident (CVA) due to embolism of right middle cerebral artery I63.411   • Hematuria R31.9       Assessment:  Active Hospital Problems (** Indicates Principal Problem)    Diagnosis Date Noted   • **Cerebrovascular accident (CVA) due to embolism of right middle cerebral artery [I63.411] 05/08/2018   • Hematuria [R31.9] 05/09/2018   • Thromboembolism of renal arteries [N28.0] 04/28/2018   • Bladder mass [N32.89] 04/27/2018   • Peripheral artery embolism or thrombosis [I74.4] 04/25/2018   • Hypertension [I10] 04/24/2018   • CHASE (acute kidney injury) [N17.9] 04/24/2018   • Generalized weakness [R53.1] 04/23/2018   • 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       Plan:  The patient's admitted to hospital will check MRI of brain.  We'll add aspirin to current regimen for anticoagulation.  We'll consult neurology, oncology and urology.    Juancarlos Jordan MD  5/9/2018  4:39 AM

## 2018-05-09 NOTE — SIGNIFICANT NOTE
05/09/18 1117   Rehab Time/Intention   Evaluation Not Performed (Pt JULISA for further testing. SLP to follow up as time permits. )   Rehab Treatment   Discipline speech language pathologist   Recommendations   SLP - Next Appointment (PRN)

## 2018-05-09 NOTE — SIGNIFICANT NOTE
05/09/18 1314   Rehab Time/Intention   Evaluation Not Performed unable to evaluate, medical status change  (Pt found to have L LE DVT. Per RN Chery, hold PT until tomorrow and await for clarification for mobility from MD. )   Rehab Treatment   Discipline physical therapist   Recommendation   PT - Next Appointment 05/10/18

## 2018-05-09 NOTE — CONSULTS
Encounter Date: 5/9/2018    CHIEF COMPLAINT: Left-sided weakness since 3 days referred by Dr. Jordan    Patient Active Problem List   Diagnosis   • Basilar artery occlusion with cerebral infarction   • Generalized weakness   • Hypertension   • CHASE (acute kidney injury)   • Pyelonephritis   • BPH (benign prostatic hyperplasia)   • Toe cyanosis   • Proteinuria   • Peripheral artery embolism or thrombosis   • Cardiac mass   • Bladder mass   • Thromboembolism of renal arteries   • Cerebrovascular accident (CVA) due to embolism of right middle cerebral artery   • Hematuria       PRESENT ILLNESS:    Portions of this notes is copied from previous physician encounters, reviewed and edited appropriately.    Background:     Jalen Richards is a 77 y.o. male with history of vascular occlusion in the past recent multiple embolic CVA secondary to possible mass in the left atrium, failed eliquis therapy in the recent past, squamous cell carcinoma in the past, acute kidney injury, bladder mass now admitted with worsening left-sided weakness since 3 days.    Patient examined by the bedside: Except mild left-sided weakness and some visual symptoms on the left no other issues.  Patient is compliant on the medications.  Family present by the bedside.      Review of systems   No neck stiffness  No photophobia  All systems reviewed and are negative.         Past Medical History:   Diagnosis Date   • Cancer     3-4 years ago lymph node radiation    • Disease of thyroid gland    • Hyperlipidemia    • Hypertension    • Prostate cancer 2015    no treatment. observation by Dr Yarbrough   • Prostatic hypertrophy     followed by Dr Yarbrough   • Stroke        Past Surgical History:   Procedure Laterality Date   • JOINT REPLACEMENT      left hip       Current Facility-Administered Medications   Medication Dose Route Frequency Provider Last Rate Last Dose   • acetaminophen (TYLENOL) tablet 650 mg  650 mg Oral Q4H PRN Juancarlos Jordan MD       •  amLODIPine (NORVASC) tablet 5 mg  5 mg Oral Q24H Juancarlos Jordan MD       • aspirin tablet 325 mg  325 mg Oral Daily Juancarlos Jordan MD        Or   • aspirin suppository 300 mg  300 mg Rectal Daily Juancarlos Jordan MD       • atorvastatin (LIPITOR) tablet 80 mg  80 mg Oral Nightly Juancarlos Jordan MD       • enoxaparin (LOVENOX) syringe 80 mg  80 mg Subcutaneous Q12H Juancarlos Jordan MD   80 mg at 05/09/18 0114   • ferrous sulfate tablet 325 mg  325 mg Oral Daily With Breakfast Juancarlos Jordan MD       • finasteride (PROSCAR) tablet 5 mg  5 mg Oral Daily Juancarlos Jordan MD       • levothyroxine (SYNTHROID, LEVOTHROID) tablet 50 mcg  50 mcg Oral Daily Juancarlos Jordan MD       • sodium chloride 0.9 % flush 1-10 mL  1-10 mL Intravenous PRN Juancarlos Jordan MD       • sodium chloride 0.9 % flush 10 mL  10 mL Intravenous PRN Vitor Diaz MD       • sodium chloride 0.9 % infusion  100 mL/hr Intravenous Continuous Juancarlos Jordan  mL/hr at 05/09/18 0115 100 mL/hr at 05/09/18 0115       No Known Allergies    Social History     Social History   • Marital status:      Spouse name: Katelyn   • Number of children: N/A   • Years of education: N/A     Occupational History   • Not on file.     Social History Main Topics   • Smoking status: Current Every Day Smoker     Packs/day: 1.00   • Smokeless tobacco: Never Used      Comment: Discussed with patient 5-7 min. Quit [employer] [place] information provided    • Alcohol use No   • Drug use: No   • Sexual activity: Not Currently     Other Topics Concern   • Not on file     Social History Narrative   • No narrative on file       History reviewed. No pertinent family history.    No family status information on file.       No current facility-administered medications on file prior to encounter.      Current Outpatient Prescriptions on File Prior to Encounter   Medication Sig   • acetaminophen (TYLENOL) 325 MG tablet Take 2 tablets by mouth Every 4 (Four) Hours As Needed for Mild Pain .   • amLODIPine  (NORVASC) 10 MG tablet Take 0.5 tablets by mouth Daily.   • atorvastatin (LIPITOR) 20 MG tablet Take 20 mg by mouth Daily.   • enoxaparin (LOVENOX) 80 MG/0.8ML solution syringe Inject 0.8 mL under the skin Every 12 (Twelve) Hours.   • finasteride (PROSCAR) 5 MG tablet Take 1 tablet by mouth Daily.   • levothyroxine (SYNTHROID, LEVOTHROID) 50 MCG tablet Take 50 mcg by mouth Daily.           PHYSICAL EXAMINATION:    Vitals: Patient Vitals for the past 4 hrs:   BP Temp Temp src Pulse Resp Weight   05/09/18 0716 160/83 98.5 °F (36.9 °C) Oral 77 18 -   05/09/18 0533 - - - - - 76.2 kg (167 lb 15.9 oz)     Temp:  [97.9 °F (36.6 °C)-98.5 °F (36.9 °C)] 98.5 °F (36.9 °C)  Heart Rate:  [74-96] 77  Resp:  [18-22] 18  BP: (140-170)/(73-91) 160/83    General:  pleasant in no acute distress.  HEENT: No pallor or icterus. Neck supple. No Carotid bruits.  Heart: S1 and S2 normal with regular rhythm.  No murmur.       GENERAL NEUROLOGIC EXAM     Mental Status: Awake alert and oriented to person place and time. Follows simple and complex commands . language normal.     Cranial nerves:  Fundi were normal bilaterally.  Visual fields left homonymous hemianopia .  Pupils are equal regular and reactive to light. Extraocular movements are intact. Facial sensation was normal and symmetrical bilaterally. Muscles of facial expression were strong and symmetric. Hearing to finger rub normal bilaterally. Palate elevates symmetrically. Sternocleidomastoid and trapezius normal. The tongue protrudes midline without atrophy or fasciculations.      Motor: Normal tone and bulk with no involuntary movements or pronator drift.    Strength 4+/5 left upper and lower extremities.     Sensation: Light touch, pin prick, vibration and joint position sense were normal in the upper and lower extremities.     Reflexes: 1+ bilaterally symmetrical with down going toes.    Coordination: abnormal left finger nose and heel shin test     Gait:  deffered     Labs:      Lab Results   Component Value Date    HGB 8.7 (L) 05/09/2018    HCT 26.7 (L) 05/09/2018    WBC 10.57 05/09/2018     05/09/2018     Lab Results   Component Value Date    BUN 20 05/09/2018    CALCIUM 8.1 (L) 05/09/2018     05/09/2018    K 3.6 05/09/2018    CL 99 05/09/2018    CO2 28.8 05/09/2018     Lab Results   Component Value Date    ALT 57 (H) 05/08/2018    AST 41 (H) 05/08/2018    BILITOT 0.3 05/08/2018    BILIDIR <9 04/27/2018    BILIDIR <9 04/27/2018    BILIDIR <9 04/27/2018     Lab Results   Component Value Date    PHOS 3.0 05/01/2018    MG 2.4 04/30/2018    PROTIME 14.4 (H) 05/08/2018    INR 1.14 (H) 05/08/2018     No components found for: POCGLUC  No components found for: A1C  Lab Results   Component Value Date    HDL 49 05/09/2018    LDL 61 05/09/2018     No components found for: B12  Lab Results   Component Value Date    TSH 4.930 (H) 05/09/2018       Lab Results (last 24 hours)     Procedure Component Value Units Date/Time    TSH [[id number]]  (Abnormal) Collected:  05/09/18 0548    Specimen:  Blood Updated:  05/09/18 0658     TSH 4.930 (H) mIU/mL     Lipid Panel [[id number]] Collected:  05/09/18 0344    Specimen:  Blood Updated:  05/09/18 0436     Total Cholesterol 129 mg/dL      Triglycerides 94 mg/dL      HDL Cholesterol 49 mg/dL      LDL Cholesterol  61 mg/dL      VLDL Cholesterol 18.8 mg/dL      LDL/HDL Ratio 1.25    Narrative:       Cholesterol Reference Ranges  (U.S. Department of Health and Human Services ATP III Classifications)    Desirable          <200 mg/dL  Borderline High    200-239 mg/dL  High Risk          >240 mg/dL      Triglyceride Reference Ranges  (U.S. Department of Health and Human Services ATP III Classifications)    Normal           <150 mg/dL  Borderline High  150-199 mg/dL  High             200-499 mg/dL  Very High        >500 mg/dL    HDL Reference Ranges  (U.S. Department of Health and Human Services ATP III Classifcations)    Low     <40 mg/dl (major risk  factor for CHD)  High    >60 mg/dl ('negative' risk factor for CHD)        LDL Reference Ranges  (U.S. Department of Health and Human Services ATP III Classifcations)    Optimal          <100 mg/dL  Near Optimal     100-129 mg/dL  Borderline High  130-159 mg/dL  High             160-189 mg/dL  Very High        >189 mg/dL    Basic Metabolic Panel [[id number]]  (Abnormal) Collected:  05/09/18 0344    Specimen:  Blood Updated:  05/09/18 0436     Glucose 82 mg/dL      BUN 20 mg/dL      Creatinine 1.48 (H) mg/dL      Sodium 137 mmol/L      Potassium 3.6 mmol/L      Chloride 99 mmol/L      CO2 28.8 mmol/L      Calcium 8.1 (L) mg/dL      eGFR Non African Amer 46 (L) mL/min/1.73      BUN/Creatinine Ratio 13.5     Anion Gap 9.2 mmol/L     Narrative:       The MDRD GFR formula is only valid for adults with stable renal function between ages 18 and 70.    Hemoglobin A1c [[id number]]  (Normal) Collected:  05/09/18 0344    Specimen:  Blood Updated:  05/09/18 0426     Hemoglobin A1C 5.10 %     Narrative:       Hemoglobin A1C Ranges:    Increased Risk for Diabetes  5.7% to 6.4%  Diabetes                     >= 6.5%  Diabetic Goal                < 7.0%    CBC & Differential [[id number]] Collected:  05/09/18 0344    Specimen:  Blood Updated:  05/09/18 0416    Narrative:       The following orders were created for panel order CBC & Differential.  Procedure                               Abnormality         Status                     ---------                               -----------         ------                     CBC Auto Differential[[id number]]        Abnormal            Final result                 Please view results for these tests on the individual orders.    CBC Auto Differential [[id number]]  (Abnormal) Collected:  05/09/18 0344    Specimen:  Blood Updated:  05/09/18 0416     WBC 10.57 10*3/mm3      RBC 2.83 (L) 10*6/mm3      Hemoglobin 8.7 (L) g/dL      Hematocrit 26.7 (L) %      MCV 94.3 fL      MCH 30.7 pg      MCHC 32.6  g/dL      RDW 13.1 %      RDW-SD 44.5 fl      MPV 9.0 fL      Platelets 260 10*3/mm3      Neutrophil % 82.2 (H) %      Lymphocyte % 11.4 (L) %      Monocyte % 4.6 (L) %      Eosinophil % 0.9 %      Basophil % 0.3 %      Immature Grans % 0.6 (H) %      Neutrophils, Absolute 8.69 (H) 10*3/mm3      Lymphocytes, Absolute 1.21 10*3/mm3      Monocytes, Absolute 0.49 10*3/mm3      Eosinophils, Absolute 0.09 10*3/mm3      Basophils, Absolute 0.03 10*3/mm3      Immature Grans, Absolute 0.06 (H) 10*3/mm3     Blood Culture - Blood, [[id number]] Collected:  05/08/18 2350    Specimen:  Blood from Arm, Left Updated:  05/09/18 0004    Blood Culture - Blood, [[id number]] Collected:  05/08/18 2351    Specimen:  Blood from Arm, Right Updated:  05/09/18 0004    Comprehensive Metabolic Panel [[id number]]  (Abnormal) Collected:  05/08/18 1858    Specimen:  Blood Updated:  05/08/18 1941     Glucose 95 mg/dL      BUN 21 mg/dL      Creatinine 1.51 (H) mg/dL      Sodium 136 mmol/L      Potassium 4.0 mmol/L      Chloride 94 (L) mmol/L      CO2 26.9 mmol/L      Calcium 8.2 (L) mg/dL      Total Protein 6.5 g/dL      Albumin 3.50 g/dL      ALT (SGPT) 57 (H) U/L      AST (SGOT) 41 (H) U/L      Alkaline Phosphatase 134 (H) U/L      Total Bilirubin 0.3 mg/dL      eGFR Non African Amer 45 (L) mL/min/1.73      Globulin 3.0 gm/dL      A/G Ratio 1.2 g/dL      BUN/Creatinine Ratio 13.9     Anion Gap 15.1 mmol/L     Narrative:       The MDRD GFR formula is only valid for adults with stable renal function between ages 18 and 70.    Protime-INR [[id number]]  (Abnormal) Collected:  05/08/18 1858    Specimen:  Blood Updated:  05/08/18 1919     Protime 14.4 (H) Seconds      INR 1.14 (H)    CBC & Differential [[id number]] Collected:  05/08/18 1858    Specimen:  Blood Updated:  05/08/18 1910    Narrative:       The following orders were created for panel order CBC & Differential.  Procedure                               Abnormality         Status                      ---------                               -----------         ------                     CBC Auto Differential[[id number]]        Abnormal            Final result                 Please view results for these tests on the individual orders.    CBC Auto Differential [[id number]]  (Abnormal) Collected:  05/08/18 1858    Specimen:  Blood Updated:  05/08/18 1910     WBC 14.80 (H) 10*3/mm3      RBC 3.35 (L) 10*6/mm3      Hemoglobin 10.1 (L) g/dL      Hematocrit 31.6 (L) %      MCV 94.3 fL      MCH 30.1 pg      MCHC 32.0 (L) g/dL      RDW 13.1 %      RDW-SD 44.2 fl      MPV 8.9 fL      Platelets 292 10*3/mm3      Neutrophil % 88.3 (H) %      Lymphocyte % 8.3 (L) %      Monocyte % 2.3 (L) %      Eosinophil % 0.3 %      Basophil % 0.3 %      Immature Grans % 0.5 %      Neutrophils, Absolute 13.07 (H) 10*3/mm3      Lymphocytes, Absolute 1.23 10*3/mm3      Monocytes, Absolute 0.34 10*3/mm3      Eosinophils, Absolute 0.04 10*3/mm3      Basophils, Absolute 0.04 10*3/mm3      Immature Grans, Absolute 0.08 (H) 10*3/mm3           .  Imaging Results (last 24 hours)     Procedure Component Value Units Date/Time    CT Head Without Contrast [[id number]] Collected:  05/08/18 1934     Updated:  05/08/18 1939    Narrative:       CT HEAD WO CONTRAST-     INDICATIONS: Weakness     TECHNIQUE: Radiation dose reduction techniques were utilized, including  automated exposure control and exposure modulation based on body size.      Noncontrast head CT     COMPARISON: 4/23/2018     FINDINGS:      Chronic left frontal lobe parenchymal calcification, bilateral  occipital encephalomalacia/old infarct change.     No acute intracranial hemorrhage, midline shift or mass effect. No acute  territorial infarct is identified. Thinning of the pituitary is noted.     Mild periventricular hypodensities suggest chronic small vessel ischemic  change in a patient this age.     Arterial calcifications are seen at the base of the brain.     Ventricles, cisterns,  cerebral sulci are unremarkable for patient age.     The visualized paranasal sinuses, orbits, mastoid air cells are  unremarkable.                   Impression:              No acute intracranial hemorrhage or hydrocephalus. Chronic changes  of the brain. If there is further clinical concern, MRI could be  considered for further evaluation.     This report was finalized on 5/8/2018 7:36 PM by Dr. Jarad Kothari M.D.           CT Angiogram of the head and neck on April 12 mild to moderate stenosis at the left vertebral origin and mild stenosis of the left vertebral artery as it pierces the dura and extends intracranially and the post PICA segment of the distal left vertebral artery is small but patent and the dominant right vertebral artery is widely patent from its origin at the vertebrobasilar junction. In the distal basilar artery extending to just proximal to the superior cerebellar artery origins, there is abrupt cut-off of the contrast column by a 3-4 mm long clot or embolus to the distal basilar artery. There is reconstitution of the basilar tip via retrograde flow by bilateral posterior communicating arteries that refill the basilar tip and also fill the superior cerebellar arteries and the P1, P2 and P3 branches of the posterior cerebral arteries bilaterally, however there is abrupt occlusion of the distal P3 segment right posterior cerebral artery likely by an embolus.    For this problem, the following was ordered:  Orders Placed This Encounter   Procedures   • Blood Culture - Blood,   • Blood Culture - Blood,   • CT Head Without Contrast   • MRI Brain Without Contrast   • Comprehensive Metabolic Panel   • Protime-INR   • CBC Auto Differential   • Hemoglobin A1c   • Lipid Panel   • Urinalysis With / Culture If Indicated - Urine, Clean Catch   • Basic Metabolic Panel   • CBC Auto Differential   • TSH   • Diet Soft Texture; Chopped; Thin   • Monitor Blood Pressure   • Vital Signs Per Hospital Policy    • Vital Signs   • Pulse Oximetry, Continuous   • Cardiac Monitoring   • Elevate Head of Bed   • Turn Patient   • Intake and Output   • Neuro Checks   • NIHSS Assessment   • Order CT Head Without Contrast for Neurological Decline   • Place Compression Stockings (TEDs)   • Remove & Replace Compression Stockings (TEDS) Daily   • Provide Stroke Education Material   • Nursing Dysphagia Screening (Complete Prior to Giving Anything By Mouth)   • RN to Place Order SLP Consult - Eval & Treat Choosing Reason of RN Dysphagia Screen Failed   • Nurse to Call MD or Nutrition Services for Diet if Patient Passes Dysphagia Screen   • Notify Provider   • Saline Lock & Maintain IV Access   • Place Sequential Compression Device   • Maintain Sequential Compression Device   • Full Code   • [facility] (on-call MD unless specified)   • Inpatient Neuro Clinical Specialist Consult   • Inpatient Rehab Admission Consult   • Inpatient Case Management  Consult   • Inpatient Neurology Consult   • Hematology & Oncology Inpatient Consult   • Inpatient Urology Consult   • OT Consult: Eval & Treat Stroke Patient   • PT Consult: Eval & Treat   • SLP Consult: Eval & Treat   • POC Glucose Once   • POC Glucose Q6H   • ECG 12 Lead   • Insert peripheral IV   • Insert Peripheral IV   • Inpatient Admission   • CBC & Differential   • CBC & Differential     MRI OF THE BRAIN WITHOUT CONTRAST     CONCLUSION:  New left-sided weakness. Expressive aphasia.     TECHNIQUE: MRI of the brain was obtained with sagittal T1, axial T1,  axial FLAIR, axial T2, axial diffusion, and axial gradient echo images.     COMPARISON: MRI of the brain dated 04/24/2018.     FINDINGS:  On the previous MRI examination of 04/24/2018, there were innumerable  areas of acute to subacute infarction appreciated within both cerebellar  hemispheres, the right caudate body, the occipital lobes, the parietal  lobes, and the frontal lobes. On the current examination, there are foci  of  restricted diffusion identified that most likely represent new areas  of acute infarction and these are appreciated within the left cerebellar  hemisphere where the largest of these abnormalities measures up to  approximately 1.1 cm in diameter, the medial portion of the right  occipital lobe where the largest of these abnormalities measures up to  approximately 2.0 x 1.8 cm in greatest axial dimensions, and the medial  parietal lobes where the largest of these measures up to 1.2 cm in  diameter is located within the left parietal lobe. These foci of acute  infarction are probably within the left PICA, left MCA, and bilateral  PCA distributions although foci of acute infarction within the medial  portions of the parietal lobes could potentially be within the DONN  distributions and the abnormality within the right parietal lobe could  potentially be within either the right DONN, MCA, or PCA distribution.  Additionally, there are other areas of more subtle diffusion-weighted  hyperintensity which were not definitively appreciated on the prior  study. These are seen within both superior parietal lobes, the lateral  aspect of the right parietal lobe, and the lateral aspect of the right  frontal lobe where relatively punctate subcentimeter foci of more subtle  diffusion-weighted hyperintensity is seen and there are some new areas  of FLAIR hyperintensity. These findings are also suspicious for new more  subacute infarcts that have developed in the interval since the prior  examination. The gradient echo sequence demonstrates subtle areas of  hemorrhagic transformation within the infarct within the left frontal  lobe, foci of infarction within the right occipital lobe, and foci of  infarction within both cerebellar hemispheres. The foci of hemorrhagic  transformation are subcentimeter in size.     Otherwise, the ventricles, sulci, and cisterns are age appropriate. The  major flow related signal voids are within normal  limits.     Incidentally noted is some fluid signal intensity within the mastoid air  cells. Mucosal thickening is incidentally appreciated within the  ethmoids.     IMPRESSION:  There are innumerable foci of supratentorial and infratentorial  infarction within multiple vascular distributions as discussed in detail  above. While many of these areas of infarction were evident on the  previous exam, there is evidence for multiple new areas of infarction of  varying ages since the previous study. The gradient echo sequence  demonstrates foci of hemorrhagic transformation both supratentorially  and infratentorially with multiple areas of subcentimeter hemorrhage.     These findings were discussed with Dr. Rivas on 05/09/2018 at  approximately 10:45 AM.  Problem List Items Addressed This Visit     * (Principal)Cerebrovascular accident (CVA) due to embolism of right middle cerebral artery - Primary      Other Visit Diagnoses    None.         ASSESSMENT/PLAN: This is a 77 y.o. male who has   Past Medical History:   Diagnosis Date   • Cancer     3-4 years ago lymph node radiation    • Disease of thyroid gland    • Hyperlipidemia    • Hypertension    • Prostate cancer 2015    no treatment. observation by Dr Yarbrough   • Prostatic hypertrophy     followed by Dr Yarbrough   • Stroke     presents with Multiple admissions in the recent past for embolic CVA in bilateral hemispheres presumed to probably secondary to hypercoagulable state from malignancy-bladder, squamous, prostate; and left atrial mass now readmitted [patient was discharged early May] with worsening left-sided weakness since 3 days.  Patient was not a candidate for TPA or thrombectomy given his recent strokes and Lovenox therapy.  Patient is on long-term Lovenox therapy.    1. Bilateral embolic CVA the new one in the right occipital lobe and left cerebellum:     - Stroke labs; B12, TSH, lipid and A1c.  A1c 5.1 TSH 4.9 LDL 61 B12 507  - MRI brain as above  -  Telemetry   - PTOT speech rehabilitation assessment  - DVT prophylaxis  - Swallow study  - Statins  - Echocardiogram.  - Add Antiplatelet therapy to the current regime.  - Continue Lovenox therapeutic dose    2. At discharge Secondary stroke prophylaxis: Ideal targets for stroke prevention would be Blood pressure < 130/80; B12 > 500 TSH in normal range and LDL < 70; HbA1c < 6.5 and smoking cessation if applicable.    Patient can be discharged on Friday back to rehabilitation    Thank you for allowing us to participate in the care of this patient.    Curtis Madera MD  05/09/18  8:00 AM

## 2018-05-10 ENCOUNTER — APPOINTMENT (OUTPATIENT)
Dept: CARDIOLOGY | Facility: HOSPITAL | Age: 78
End: 2018-05-10

## 2018-05-10 VITALS
OXYGEN SATURATION: 94 % | TEMPERATURE: 98.4 F | RESPIRATION RATE: 18 BRPM | WEIGHT: 163.14 LBS | HEIGHT: 71 IN | SYSTOLIC BLOOD PRESSURE: 153 MMHG | HEART RATE: 72 BPM | BODY MASS INDEX: 22.84 KG/M2 | DIASTOLIC BLOOD PRESSURE: 87 MMHG

## 2018-05-10 LAB
ANION GAP SERPL CALCULATED.3IONS-SCNC: 12 MMOL/L
BASOPHILS # BLD AUTO: 0.04 10*3/MM3 (ref 0–0.2)
BASOPHILS NFR BLD AUTO: 0.3 % (ref 0–1.5)
BUN BLD-MCNC: 18 MG/DL (ref 8–23)
BUN/CREAT SERPL: 13.2 (ref 7–25)
CALCIUM SPEC-SCNC: 8.3 MG/DL (ref 8.6–10.5)
CHLORIDE SERPL-SCNC: 99 MMOL/L (ref 98–107)
CO2 SERPL-SCNC: 27 MMOL/L (ref 22–29)
CREAT BLD-MCNC: 1.36 MG/DL (ref 0.76–1.27)
DEPRECATED RDW RBC AUTO: 44.6 FL (ref 37–54)
EOSINOPHIL # BLD AUTO: 0.14 10*3/MM3 (ref 0–0.7)
EOSINOPHIL NFR BLD AUTO: 1.2 % (ref 0.3–6.2)
ERYTHROCYTE [DISTWIDTH] IN BLOOD BY AUTOMATED COUNT: 13.2 % (ref 11.5–14.5)
GFR SERPL CREATININE-BSD FRML MDRD: 51 ML/MIN/1.73
GLUCOSE BLD-MCNC: 93 MG/DL (ref 65–99)
GLUCOSE BLDC GLUCOMTR-MCNC: 100 MG/DL (ref 70–130)
GLUCOSE BLDC GLUCOMTR-MCNC: 104 MG/DL (ref 70–130)
GLUCOSE BLDC GLUCOMTR-MCNC: 108 MG/DL (ref 70–130)
GLUCOSE BLDC GLUCOMTR-MCNC: 111 MG/DL (ref 70–130)
HCT VFR BLD AUTO: 27.1 % (ref 40.4–52.2)
HGB BLD-MCNC: 8.7 G/DL (ref 13.7–17.6)
IMM GRANULOCYTES # BLD: 0.06 10*3/MM3 (ref 0–0.03)
IMM GRANULOCYTES NFR BLD: 0.5 % (ref 0–0.5)
LYMPHOCYTES # BLD AUTO: 0.57 10*3/MM3 (ref 0.9–4.8)
LYMPHOCYTES NFR BLD AUTO: 5 % (ref 19.6–45.3)
MCH RBC QN AUTO: 30.2 PG (ref 27–32.7)
MCHC RBC AUTO-ENTMCNC: 32.1 G/DL (ref 32.6–36.4)
MCV RBC AUTO: 94.1 FL (ref 79.8–96.2)
MONOCYTES # BLD AUTO: 1.2 10*3/MM3 (ref 0.2–1.2)
MONOCYTES NFR BLD AUTO: 10.5 % (ref 5–12)
NEUTROPHILS # BLD AUTO: 9.44 10*3/MM3 (ref 1.9–8.1)
NEUTROPHILS NFR BLD AUTO: 82.5 % (ref 42.7–76)
PLATELET # BLD AUTO: 288 10*3/MM3 (ref 140–500)
PMV BLD AUTO: 8.8 FL (ref 6–12)
POTASSIUM BLD-SCNC: 3.6 MMOL/L (ref 3.5–5.2)
RBC # BLD AUTO: 2.88 10*6/MM3 (ref 4.6–6)
SODIUM BLD-SCNC: 138 MMOL/L (ref 136–145)
WBC NRBC COR # BLD: 11.45 10*3/MM3 (ref 4.5–10.7)

## 2018-05-10 PROCEDURE — 93308 TTE F-UP OR LMTD: CPT | Performed by: INTERNAL MEDICINE

## 2018-05-10 PROCEDURE — 99232 SBSQ HOSP IP/OBS MODERATE 35: CPT | Performed by: INTERNAL MEDICINE

## 2018-05-10 PROCEDURE — 99233 SBSQ HOSP IP/OBS HIGH 50: CPT | Performed by: INTERNAL MEDICINE

## 2018-05-10 PROCEDURE — 93325 DOPPLER ECHO COLOR FLOW MAPG: CPT

## 2018-05-10 PROCEDURE — 93308 TTE F-UP OR LMTD: CPT

## 2018-05-10 PROCEDURE — 85025 COMPLETE CBC W/AUTO DIFF WBC: CPT | Performed by: INTERNAL MEDICINE

## 2018-05-10 PROCEDURE — 82962 GLUCOSE BLOOD TEST: CPT

## 2018-05-10 PROCEDURE — 80048 BASIC METABOLIC PNL TOTAL CA: CPT | Performed by: INTERNAL MEDICINE

## 2018-05-10 PROCEDURE — 93325 DOPPLER ECHO COLOR FLOW MAPG: CPT | Performed by: INTERNAL MEDICINE

## 2018-05-10 PROCEDURE — 99233 SBSQ HOSP IP/OBS HIGH 50: CPT | Performed by: NURSE PRACTITIONER

## 2018-05-10 RX ORDER — ASPIRIN 325 MG
325 TABLET ORAL DAILY
Status: DISCONTINUED | OUTPATIENT
Start: 2018-05-10 | End: 2018-05-10 | Stop reason: HOSPADM

## 2018-05-10 RX ORDER — ASPIRIN 325 MG
325 TABLET ORAL DAILY
Start: 2018-05-10

## 2018-05-10 RX ADMIN — DABIGATRAN ETEXILATE MESYLATE 150 MG: 150 CAPSULE ORAL at 09:25

## 2018-05-10 RX ADMIN — FERROUS SULFATE TAB 325 MG (65 MG ELEMENTAL FE) 325 MG: 325 (65 FE) TAB at 09:24

## 2018-05-10 RX ADMIN — AMLODIPINE BESYLATE 5 MG: 5 TABLET ORAL at 09:25

## 2018-05-10 RX ADMIN — ACETAMINOPHEN 650 MG: 325 TABLET, FILM COATED ORAL at 04:06

## 2018-05-10 RX ADMIN — Medication 1000 MCG: at 09:25

## 2018-05-10 RX ADMIN — FOLIC ACID 1 MG: 1 TABLET ORAL at 09:25

## 2018-05-10 RX ADMIN — ASPIRIN 81 MG: 81 TABLET ORAL at 09:25

## 2018-05-10 RX ADMIN — ASPIRIN 325 MG: 325 TABLET ORAL at 15:35

## 2018-05-10 RX ADMIN — LEVOTHYROXINE SODIUM 50 MCG: 50 TABLET ORAL at 09:25

## 2018-05-10 RX ADMIN — FINASTERIDE 5 MG: 5 TABLET, FILM COATED ORAL at 09:25

## 2018-05-10 RX ADMIN — ACETAMINOPHEN 650 MG: 325 TABLET, FILM COATED ORAL at 09:25

## 2018-05-10 NOTE — DISCHARGE SUMMARY
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     Procedures Performed       Consults     Date and Time Order Name Status Description    5/9/2018 1326 Inpatient Cardiology Consult Completed     5/8/2018 2259 Inpatient Urology Consult      5/8/2018 2259 Hematology & Oncology Inpatient Consult Completed     5/8/2018 2257 Inpatient Neurology Consult Completed     5/8/2018 2021 [facility] (on-call MD unless specified) Completed     4/27/2018 0858 Hematology & Oncology Inpatient Consult Completed     4/24/2018 1758 Inpatient Infectious Diseases Consult Completed     4/24/2018 1606 Inpatient Vascular Surgery Consult Completed     4/24/2018 1139 Inpatient Neurology Consult Completed     4/24/2018 1139 Inpatient Nephrology Consult Completed     4/24/2018 0354 Inpatient Cardiology Consult Completed     4/23/2018 2320 [facility] (on-call MD unless specified) Completed     4/12/2018 1456 Inpatient Cardiology Consult Completed     4/12/2018 1059 Inpatient Consult to Neurology (on call physician/group) Completed     4/12/2018 1059 Consult Interventional Neurologist and/or Stroke Team Completed         Hospital Course  Please see  history and physical for details. Patient is a 77 y.o. male who is wonderfully pleasant and is well known to myself as I had discharged him on the previous hospitalization.  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  He was discharged home on Lovenox twice daily and was in rehabilitation where he received all his doses and unfortunately came back to the hospital as he is continuing to have multiple acute on chronic cerebral accidents due to this vegetation.  Luckily this time kidney function is holding its own as he previously had thrown renal emboli and his toes are not nearly as cyanotic as they were previously as he was also throwing peripheral clots.  At this juncture the day prior to discharge I had a long conversation with patient wife and daughter who is now appointed his power of  due to wife have an mild aspects of dementia.  It was unanimous decision to change his CODE STATUS to a DNR and this was the patient's choice.  As of today myself  with Neurology and Dr Earl bhakta/ Oncology has been well greater than an hour's time discussing this case.  We tried to come up with every possible treatment aspect available.  We went down to radiology and discussed the case to see if any of the retroperitoneal nodes could be large enough to warrant a biopsy.  We went over CT scans of the neck chest abdomen and pelvis and unfortunately there is nothing that we can get a biopsy on.  Urology was willing to participate in a bladder biopsy but the patient have to be off blood thinners for 7 days pre-and postop which is unacceptable in this case and is too high risk.  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  No lymph nodes in the head neck or abdomen pelvis are warranted to proceed with CT-guided biopsy.  At this juncture we also gave the option of possible transfer to a teaching institution but ultimately none of us feel that can change outcome.  The 3 of us then went back to the room and had an open discussion with all family members and the daughter was on the cell phone via speaker.  Patient very clearly states that \"I want to go home\" he is alert and ordered ×3 and able to make his own medical decisions at this juncture and the family is compliant with his decision.  All questions have been answered to all family members.  At this juncture I have discussed the case with CCP and she is going to help coordinate Lovenox injections and nursing staff is going to teach multiple family members including patient on self administration.  The goal is to get this gentleman home today per his wishes to proceed with home with hospice any further.  Patient has also made statements that even if he found the cancer he is not wanting to undergo any type of chemotherapy at this juncture in his life so we will respect that.  At this juncture the goal is to get him home today and if hospice can evaluate her in the hospital that's great if not they can come out of the household tonight but Lovenox is got to be approved and this will begin 12 hours post his previous Pradaxa dose which was given this morning.        Condition on Discharge: Improved.     Temp:  [97.6 °F (36.4 °C)-99.3 °F (37.4 °C)] 98.4 °F (36.9 °C)  Heart Rate:  [72-92] 72  Resp:  [18] 18  BP: (152-168)/(83-87) 153/87  Body mass index is 22.76 kg/m².    Physical Exam   Constitutional: He is oriented to person, place, and time. He appears well-developed and well-nourished.   Neck: Neck supple. No JVD present.   Cardiovascular: Normal rate and regular rhythm.    Pulmonary/Chest: Effort normal and breath sounds " normal. No respiratory distress.   Abdominal: Soft. Bowel sounds are normal. He exhibits no distension. There is no tenderness.   Musculoskeletal: He exhibits no edema.   Neurological: He is alert and oriented to person, place, and time. No cranial nerve deficit.   Mild left-sided weakness     Discharge Medications   MauriceJalen MADISYN   Home Medication Instructions LANI:[id number]    Printed on:05/10/18 1123   Medication Information                      acetaminophen (TYLENOL) 325 MG tablet  Take 2 tablets by mouth Every 4 (Four) Hours As Needed for Mild Pain .             amLODIPine (NORVASC) 10 MG tablet  Take 0.5 tablets by mouth Daily.             aspirin 325 MG tablet  Take 1 tablet by mouth Daily.             atorvastatin (LIPITOR) 20 MG tablet  Take 20 mg by mouth Daily.             enoxaparin (LOVENOX) 80 MG/0.8ML solution syringe  Inject 0.8 mL under the skin Every 12 (Twelve) Hours.             finasteride (PROSCAR) 5 MG tablet  Take 1 tablet by mouth Daily.             levothyroxine (SYNTHROID, LEVOTHROID) 50 MCG tablet  Take 50 mcg by mouth Daily.                  Follow-up Information     Geovany Saba MD .    Specialties:  Emergency Medicine, General Practice  Contact information:  [address] [facility] [address] [phone]                 Test Results Pending at Discharge   Order Current Status    Blood Culture - Blood, Preliminary result    Blood Culture - Blood, Preliminary result         Randy Rivas MD  05/10/18  11:26 AM    Discharge time spent greater than 30 minutes.

## 2018-05-10 NOTE — PROGRESS NOTES
Continued Stay Note   [facility]     Patient Name: Jalen Richards  MRN: [id number]  Today's Date: 5/10/2018    Admit Date: 5/8/2018          Discharge Plan     Row Name 05/10/18 1705       Plan    Plan Comments DC orders noted.  Deb/ Dolly met with pt/ family and pt is accepted.  They will cover 2 weeks of Lovenox and then will re-eval.  [facility] spoke with pt and his family at bedside who are in agreement with DC home with Dolly today.      Final Note Pt to be DC'd home with family and [facility]juan.                Discharge Codes    No documentation.       Expected Discharge Date and Time     Expected Discharge Date Expected Discharge Time    May 10, 2018             Agnes Haro RN

## 2018-05-10 NOTE — PROGRESS NOTES
LOS: 2 days   Patient Care Team:  Geovany Saba MD as PCP - General (Emergency Medicine)  Jacob Mtz MD as Consulting Physician (Hematology and Oncology)    Chief Complaint: stroke       Interval History: He wants to go home.  He is eating.  He denies any complaints.      Objective   Vital Signs  Temp:  [97.6 °F (36.4 °C)-99.3 °F (37.4 °C)] 98.4 °F (36.9 °C)  Heart Rate:  [72-92] 72  Resp:  [18] 18  BP: (152-168)/(83-87) 153/87    Intake/Output Summary (Last 24 hours) at 05/10/18 1009  Last data filed at 05/10/18 0900   Gross per 24 hour   Intake              510 ml   Output              350 ml   Net              160 ml           Physical Exam   Constitutional: He appears well-developed and well-nourished.   HENT:   Head: Normocephalic.   Nose: Nose normal.   Mouth/Throat: Oropharynx is clear and moist.   Eyes: Conjunctivae and EOM are normal. Pupils are equal, round, and reactive to light.   Neck: Normal range of motion. No JVD present.   Cardiovascular: Normal rate, regular rhythm, normal heart sounds and intact distal pulses.    Pulmonary/Chest: Effort normal and breath sounds normal.   Abdominal: Soft. He exhibits no mass. There is no tenderness.   Musculoskeletal: Normal range of motion. He exhibits no edema.   Neurological: He is alert.   Left weakness   Skin: Skin is warm and dry. No erythema.   Psychiatric: He has a normal mood and affect. His behavior is normal.   Vitals reviewed.      Results Review:        Results from last 7 days  Lab Units 05/10/18  0315 05/09/18  0344 05/08/18  1858   SODIUM mmol/L 138 137 136   POTASSIUM mmol/L 3.6 3.6 4.0   CHLORIDE mmol/L 99 99 94*   CO2 mmol/L 27.0 28.8 26.9   BUN mg/dL 18 20 21   CREATININE mg/dL 1.36* 1.48* 1.51*   GLUCOSE mg/dL 93 82 95   CALCIUM mg/dL 8.3* 8.1* 8.2*           Results from last 7 days  Lab Units 05/10/18  0315 05/09/18  0344 05/08/18  1858   WBC 10*3/mm3 11.45* 10.57 14.80*   HEMOGLOBIN g/dL 8.7* 8.7* 10.1*   HEMATOCRIT % 27.1*  26.7* 31.6*   PLATELETS 10*3/mm3 288 260 292       Results from last 7 days  Lab Units 05/08/18  1858   INR  1.14*       Results from last 7 days  Lab Units 05/09/18  0344   CHOLESTEROL mg/dL 129           Results from last 7 days  Lab Units 05/09/18  0344   CHOLESTEROL mg/dL 129   TRIGLYCERIDES mg/dL 94   HDL CHOL mg/dL 49   LDL CHOL mg/dL 61       I reviewed the patient's new clinical results.  I personally viewed and interpreted the patient's EKG/Telemetry data        Medication Review:     amLODIPine 5 mg Oral Q24H   aspirin 81 mg Oral Daily   atorvastatin 40 mg Oral Nightly   dabigatran etexilate 150 mg Oral Q12H   ferrous sulfate 325 mg Oral Daily With Breakfast   finasteride 5 mg Oral Daily   folic acid 1 mg Oral Daily   levothyroxine 50 mcg Oral Daily   vitamin B-12 1,000 mcg Oral Daily            Assessment/Plan     1.  Recurrent strokes  2.  New DVT despite full dose anticoagulation  3.  Marantic endocarditis  4.  Probable locally metastatic bladder cancer    He has had recurrent events despite treatment with apixaban, then IV heparin, then full dose enoxaparin.  I see that the plan is now for dabigatran and aspirin.  I reviewed the TTE from today and I don't see any gross vegetations.  This is most likely hypercoagulability of malignancy.    I think this is an uncurable condition. I expressed that to his daughter today and she understands.  She is going to discuss with him whether or not he would like to go home with [facility] and avoid repeat admissions as he really doesn't want to be in the hospital any longer.      Cyrus Renteria MD  05/10/18  10:09 AM

## 2018-05-10 NOTE — SIGNIFICANT NOTE
05/10/18 1120   Rehab Time/Intention   Evaluation Not Performed other (see comments)  (Per RN and MD, no need for PT at this time. Pt to go home today.)   Rehab Treatment   Discipline physical therapist

## 2018-05-10 NOTE — NURSING NOTE
Lovenox administration instructions discussed and demonstrated for family members to give at home. Family verbalized and demonstrated understanding.

## 2018-05-10 NOTE — NURSING NOTE
Referral per Epic stroke order set for stroke screen. Chart reviewed and events noted. Patient choice is for palliative care. Will sign off.    Hazel Patton RN  Rehab Admissions Nurse  [phone]

## 2018-05-10 NOTE — PLAN OF CARE
Problem: Patient Care Overview  Goal: Plan of Care Review  Outcome: Ongoing (interventions implemented as appropriate)   05/10/18 0585   Coping/Psychosocial   Plan of Care Reviewed With patient;family   OTHER   Outcome Summary No nausea, pt attempting urinal with moderate success, family at bedside attentive to patient, veggiatation on heart not improving with medicaiton, doppler showed DVT in left leg, bladder, brain, heart, pat eating on own, VSS, will continue ro monitor   Plan of Care Review   Progress no change       Problem: Stroke (Ischemic) (Adult)  Goal: Signs and Symptoms of Listed Potential Problems Will be Absent, Minimized or Managed (Stroke)  Outcome: Ongoing (interventions implemented as appropriate)      Problem: Fall Risk (Adult)  Goal: Identify Related Risk Factors and Signs and Symptoms  Outcome: Outcome(s) achieved Date Met: 05/10/18    Goal: Absence of Fall  Outcome: Ongoing (interventions implemented as appropriate)      Problem: Skin Injury Risk (Adult)  Goal: Identify Related Risk Factors and Signs and Symptoms  Outcome: Outcome(s) achieved Date Met: 05/10/18    Goal: Skin Health and Integrity  Outcome: Ongoing (interventions implemented as appropriate)

## 2018-05-10 NOTE — SIGNIFICANT NOTE
05/10/18 1356   Rehab Time/Intention   Evaluation Not Performed other (see comments)  (Noted possible d/c with hospice today. discussed with nurse d/c from OT at this time)   Rehab Treatment   Discipline occupational therapist

## 2018-05-10 NOTE — PLAN OF CARE
Problem: Stroke (Ischemic) (Adult)  Goal: Signs and Symptoms of Listed Potential Problems Will be Absent, Minimized or Managed (Stroke)  Outcome: Outcome(s) achieved Date Met: 05/10/18

## 2018-05-10 NOTE — PROGRESS NOTES
"Cc g heme  Doing well  Still some uf and hesitancy but no g heme.  Some flank pain last night.     LOS: 2 days   Patient Care Team:  Geovany Saba MD as PCP - General (Emergency Medicine)  Jacob Mtz MD as Consulting Physician (Hematology and Oncology)        Subjective     History of Present Illness    Subjective      Objective     Vital Signs  Temp:  [97.6 °F (36.4 °C)-99.3 °F (37.4 °C)] 98.4 °F (36.9 °C)  Heart Rate:  [77-92] 77  Resp:  [18] 18  BP: (152-168)/(83-85) 152/83    Objective:  General Appearance:  Comfortable.    Vital signs: (most recent): Blood pressure 152/83, pulse 77, temperature 98.4 °F (36.9 °C), temperature source Oral, resp. rate 18, height 180.3 cm (71\"), weight 74 kg (163 lb 2.3 oz), SpO2 91 %.  Vital signs are normal.    Output: Producing urine.    HEENT: Normal HEENT exam.    Lungs:  Normal effort and normal respiratory rate.    Abdomen: Abdomen is soft.              Results Review:  New clinical results reviewed.        Assessment/Plan     Principal Problem:    Cerebrovascular accident (CVA) due to embolism of right middle cerebral artery  Active Problems:    Basilar artery occlusion with cerebral infarction    Generalized weakness    Hypertension    CHASE (acute kidney injury)    Peripheral artery embolism or thrombosis    Bladder mass    Thromboembolism of renal arteries    Hematuria    Anemia      Assessment:  (G heme   Doing well no g heme  Will follow.   Will need eventual turbt once his anticoagulation cqn be stopped for a week or two and is medically stable.).       Davis Yarbrough MD  05/10/18  6:47 AM            "

## 2018-05-10 NOTE — PROGRESS NOTES
LOS: 2 days   Patient Care Team:  Geovany Saba MD as PCP - General (Emergency Medicine)  Jacob Mtz MD as Consulting Physician (Hematology and Oncology)    Chief Complaint:    Chief Complaint   Patient presents with   • Weakness - Generalized       Subjective     Interval History:     Patient Complaints: continued left side weakness  Patient Denies: H/A, new stroke symptoms   History taken from: patient chart family RN    Objective     Vital Signs  Temp:  [97.6 °F (36.4 °C)-99.3 °F (37.4 °C)] 98.4 °F (36.9 °C)  Heart Rate:  [72-92] 72  Resp:  [18] 18  BP: (152-168)/(83-87) 153/87      Physical Examination:  HEENT: Normocephalic, atraumatic   COR: RRR  Resp: Even and unlabored    Neurological:   MS: AO to self, May 2017. Language normal. Subtle left side neglect. Higher integrative function fair-normal  CN: LHH  Motor: 5/5, normal tone on the right, subtle LLE 4/5, 5/5 LUE      Results Review:     I reviewed the patient's new clinical results.      Results from last 7 days  Lab Units 05/09/18  0344   HEMOGLOBIN A1C % 5.10       Results from last 7 days  Lab Units 05/10/18  0315 05/09/18  0344 05/08/18  1858   WBC 10*3/mm3 11.45* 10.57 14.80*   HEMOGLOBIN g/dL 8.7* 8.7* 10.1*   HEMATOCRIT % 27.1* 26.7* 31.6*   PLATELETS 10*3/mm3 288 260 292       Lab Results   Component Value Date    CHOL 129 05/09/2018    CHOL 145 04/13/2018     Lab Results   Component Value Date    HDL 49 05/09/2018    HDL 47 04/13/2018     Lab Results   Component Value Date    LDL 61 05/09/2018    LDL 82 04/13/2018     Lab Results   Component Value Date    TRIG 94 05/09/2018    TRIG 79 04/13/2018         Results from last 7 days  Lab Units 05/10/18  0315 05/09/18  0344 05/08/18  1858   SODIUM mmol/L 138 137 136   POTASSIUM mmol/L 3.6 3.6 4.0   CHLORIDE mmol/L 99 99 94*   CO2 mmol/L 27.0 28.8 26.9   BUN mg/dL 18 20 21   CREATININE mg/dL 1.36* 1.48* 1.51*   CALCIUM mg/dL 8.3* 8.1* 8.2*   BILIRUBIN mg/dL  --   --  0.3   ALK PHOS U/L   --   --  134*   ALT (SGPT) U/L  --   --  57*   AST (SGOT) U/L  --   --  41*   GLUCOSE mg/dL 93 82 95       Medication Review: reviewed, no changes made    Assessment/Plan    Mr. Richards is a 78 yo LHW male who was readmitted on 5/8 for left side weakness. Imaging revealed new acute infarcts despite therapeutic Lovenox. Again, etiology suspected to be hypercoagulable state from bladder cancer. Unfortunately, the patient would need to be off of anticoagulation prior to and post bladder Bx due to high risk of hemorrhage. From a stroke point of view this is not possible, Dr. Yarbrough and Dr. Madera this. Also discussed with Dr. Rivas, Dr. Elliott and Dr. Coreas, possible Bx a lymphnode but they are too small.      He initially [facility] to [facility] on 4/[facility] with basilar artery occlusion and minimal deficits. He was D/C home and had an out patient SHANNON was done, + for thrombus and he was started on anticoagulation, Eliquis. He was readmitted on 4/23 with complaint of confusion and progressive neurological decline. MRI revealed multiple new small and punctate foci of acute ischemias thought to be embolic in etiology, hypercoagulable state from malignancy. CT chest/abd/pelvis revealed diffuse wall thickening of bladder concerning for bladder malignancy. He also has Hx of prostate CA and squamous cell cancer of the neck. He was switched to and D/C on Lovenox with plans for out patient bladder cancer workup.      This time he has come with more strokes in spite of being on Lovenox; and currently he is been on Lovenox and aspirin.  Previous options were discussed including switching to other anticoagulants, pursuing biopsy, combining anticoagulants and antiplatelet therapy, and other aggressive measures.  Patient has capacity to make decisions.  And patient and family have come to a decision that they would not like to pursue any further aggressive measures including medications and wants to take the route of hospice and palliative  care.  Patient does not want to have any more therapy for this medical conditions.  He wants to go home.     After a long discussion between the physicians and family it was decided that the patient does not want any treatment for the cancer, no chemotherapy. He wishes to go home and the plan is with hospice. He will go back on Lovenox, therapeutic dosing, and full dose ASA for stroke prevention and comfort measures.     Clinically his exam is essentially unchanged, mild left side weakness and a LHH. PT/OT/ST. CCP For D/C planning. Please call with questions or concerns. We will see PRN, no need for FU.     Plan:   - continue Aspirin 325mg and full dose lovenox    - Neurochecks   - Non-pharmacological DVT prophylaxis   - EKG Tele   - PT/OT/ST   - Stroke Education   - CCP for D/C planning    Principal Problem:    Cerebrovascular accident (CVA) due to embolism of right middle cerebral artery  Active Problems:    Basilar artery occlusion with cerebral infarction    Generalized weakness    Hypertension    CHASE (acute kidney injury)    Peripheral artery embolism or thrombosis    Bladder mass    Thromboembolism of renal arteries    Hematuria    Anemia      I have discussed the above with the patient, Dr. Elliott, Dr. Rivas and family.    María Coker, TJ  05/10/18  9:40 AM      Time: More than 50% of time spent in counseling and coordination of care:  Total face-to-face/floor time 20 min.  Time spent in counseling 20 min. Counseling included the following topics: testing, medication and treatment options, hospice

## 2018-05-10 NOTE — PROGRESS NOTES
REASON FOR FOLLOWUP:     Provide an opinion on any further workup or treatment of hypercoagulable status, with recurrent acute stroke, cardiac valve vegetations and left leg DVT.                                  HISTORY OF PRESENT ILLNESS:  The patient is a 77 y.o. year old male home we are consulted for evaluation of the above problem.  History was taken by reviewing medical records, and also directly provided by patient, his wife and his daughter today.         Patient is a 77-year-old male who was admitted to [facility] again yesterday because of recurrent acute stroke. This patient had first episode of stroke on 04/12/2018 because of sudden onset of aphasia and weakness in the lower extremities. He presented to the emergency room for evaluation. CT scan of the head with angiogram reported infarctions, some of them are old. He was seen by neurologist and admitted to intensive care unit. He was given full-dose aspirin 325 mg. He is not a candidate for tPA or endovascular procedure. This patient had brain MRI examination with and without contrast on 04/13/2018, which reported multifocal acute infarction with the right PCA distribution, left PCA distribution, left PICA distribution and left superior cerebellar artery distribution. The largest area of the infarction was approximately 4.5 cm x 1.6 cm. The 8 mm infarction was within the left PICA distribution. Strokes also involves the right DONN distribution and left MCA distribution. Overall, this patient has diffuse acute stroke.        Patient was given Eliquis for anticoagulation and later discharged to home on 04/16/2018. Also during hospitalization, patient was also seen by urologist, Dr. Yarbrough, for evaluation because of recent history of hematuria, which happened about 2 weeks before the onset of stroke. Dr. Yarbrough arranged the patient for outpatient workup for that problem.      This patient also had outpatient SHANNON examination on 04/19/2018,  which reported a normal ejection fraction, however, there was thickening of the leaflet of the mitral valve. There was a small mobile structure along the edge of the posterior leaflet measuring 0.45 cm and on the atrial surface it could be a vegetation or rheumatic changes or even a tiny prolapse.       The patient presented to emergency room again on 04/23/2018 because of worsening fatigue and weakness. CT scan in the emergency room was negative for acute changes. However, MRI examination on 04/24/2018 reported interval increase in multiple small and punctate foci of acute ischemia. Because of the history of hematuria, Dr. Yarbrough also requested CT scan for the abdomen and pelvis, which was also obtained on 04/24/2018. This study reported abnormalities with wall thickening of the urinary bladder very concerning for primary bladder neoplasm. There were also borderline pelvic lymphadenopathy with largest one measuring 1.2 cm in the left common iliac area.  According to the medical records, patient also had hematuria when he was on Eliquis anticoagulation.       This patient also had vascular surgery evaluation because of digital embolus confirmed by arterial ultrasound examination with moderate digital ischemia on the right hand. The left hand is normal. The lower extremity arterial examination showed severe digital ischemia in all 5 toes as well as severe digital ischemia with all 5 toes in the left foot.       This patient had transthoracic echocardiogram study on 04/27/2018, which reported large mass on the left atrial side of both the anterior and posterior mitral valve leaflets. There is also large mobile mass on the tricuspid valve leaflet. It was reported since the previous SHANNON examination, the mitral valve mass is larger, more diffuse on both leaflets and also a new tricuspid valve mass. Because the patient had more thrombosis when he was on Eliquis plus he had hematuria, anticoagulation was switched to  intravenous heparin and later switched to weight based Lovenox q.12 h..      Patient was seen by Dr. Ortiz for evaluation of hypercoagulable status and Dr. Ortiz requested antiphospholipid antibodies and anti-lupus anticoagulant. The studies were done on 04/27/2018. The studies were all negative for anticardiolipin antibodies, anti-beta-2-GP1 antibodies and antiphosphatidylserine antibodies, anti-lupus anticoagulant and also antiphospholipid antibody panel #2.     Because of worsening stroke and motor area arterial thrombosis as well as cardiac valve vegitation likely thrombotic in origin, anticoagulation was not able to be discontinued in this situation. So the patient did not have cystoscopic evaluation for suspected bladder cancer. He was discharged on Lovenox q.12 h. for anticoagulation on 05/01/2018. He was actually discharged to acute rehabilitation where he receives Lovenox.      Yesterday, this patient presented to emergency room, transferred from rehabilitation facility because of worsening symptoms related to his stroke. He had intermittent slurred speech and was not drinking water properly. Patient was brought to the emergency room and he had CT of the head without contrast, which showed no acute changes. There were chronic changes. This patient had brain MRI examination earlier today, which reported innumerable foci of supratentorial and infratentorial infarction within multiple vascular distributions. Despite many of those were evident on previous examination, there are certainly multiple new areas of infarction since that time.      His daughter also reports during the stay at rehabilitation, this patient also had recurrent episodes of gross hematuria. Since admission yesterday, there is no recurrence of hematuria. Patient currently is still on Lovenox weight based 80 mg q.12 h. He did receive this morning’s dose according to his nurse.           Medical History        Past Medical History:    Diagnosis Date   • Cancer       3-4 years ago lymph node radiation    • Disease of thyroid gland     • Hyperlipidemia     • Hypertension     • Prostate cancer 2015     no treatment. observation by Dr Yarbrough   • Prostatic hypertrophy       followed by Dr Yarbrough   • Stroke           Surgical History         Past Surgical History:   Procedure Laterality Date   • JOINT REPLACEMENT         left hip            HEMATOLOGIC/ONCOLOGIC HISTORY:  (History from previous dates can be found in the separate document.)     MEDICATIONS     Current Facility-Administered Medications:   •  acetaminophen (TYLENOL) tablet 650 mg, 650 mg, Oral, Q4H PRN, Juancarlos Jordan MD  •  amLODIPine (NORVASC) tablet 5 mg, 5 mg, Oral, Q24H, Juancarlos Jordan MD, 5 mg at 05/09/18 1033  •  aspirin EC tablet 81 mg, 81 mg, Oral, Daily, Curtis Madera MD, 81 mg at 05/09/18 1035  •  atorvastatin (LIPITOR) tablet 40 mg, 40 mg, Oral, Nightly, Curtis Madera MD  •  enoxaparin (LOVENOX) syringe 80 mg, 80 mg, Subcutaneous, Q12H, Juancarlos Jordan MD, 80 mg at 05/09/18 1034  •  ferrous sulfate tablet 325 mg, 325 mg, Oral, Daily With Breakfast, Juancarlos Jordan MD, 325 mg at 05/09/18 1033  •  finasteride (PROSCAR) tablet 5 mg, 5 mg, Oral, Daily, Juancarlos Jordan MD, 5 mg at 05/09/18 1034  •  levothyroxine (SYNTHROID, LEVOTHROID) tablet 50 mcg, 50 mcg, Oral, Daily, Juancarlos Jordan MD, 50 mcg at 05/09/18 1034  •  sodium chloride 0.9 % flush 1-10 mL, 1-10 mL, Intravenous, PRN, Juancarlos Jordan MD  •  Insert peripheral IV, , , Once **AND** sodium chloride 0.9 % flush 10 mL, 10 mL, Intravenous, PRN, Vitor Diaz MD     ALLERGIES:   No Known Allergies     SOCIAL HISTORY:     NO CHANGES.          FAMILY HISTORY:  History reviewed. No pertinent family history.     REVIEW OF SYSTEMS:  Review of Systems   Constitutional: Positive for fatigue. Negative for chills, fever and unexpected weight change.   HENT: Negative for congestion, facial swelling, rhinorrhea and sinus pressure.     Eyes: Negative for visual disturbance.   Respiratory: Negative for cough and shortness of breath.    Cardiovascular: Negative for chest pain and leg swelling.   Gastrointestinal: Negative for abdominal pain, blood in stool, constipation, nausea and vomiting.   Genitourinary: Positive for hematuria. Negative for dysuria.   Musculoskeletal: Negative for arthralgias and joint swelling.   Neurological: Positive for headaches.        See current history for detailed description of neuro symptoms   Hematological: Negative for adenopathy. Bruises/bleeds easily.   Psychiatric/Behavioral: Negative for confusion.          Objective        Vitals:    05/09/18 1915 05/09/18 2358 05/10/18 0500 05/10/18 0807   BP: 152/83 152/83  153/87   BP Location: Right arm Right arm  Right arm   Patient Position: Lying Lying  Lying   Pulse: 92 77  72   Resp: 18 18 18   Temp: 99.3 °F (37.4 °C) 98.4 °F (36.9 °C)  98.4 °F (36.9 °C)   TempSrc: Oral Oral  Oral   SpO2: 91%   94%   Weight:   74 kg (163 lb 2.3 oz)    Height:            PHYSICAL EXAM:       CONSTITUTIONAL:  Well-developed well-nourished male, sitting in chair, not any acute distress. AAOx3.   EYES:  Conjunctiva and lids unremarkable.   EARS,NOSE,MOUTH,THROAT:  Ears and nose appear unremarkable.  Lips appear unremarkable.  PSYCHIATRIC:  Normal judgment and insight.  Normal mood and affect.        RECENT LABS:        Results from last 7 days  Lab Units 05/10/18  0315 05/09/18  0344 05/08/18  1858   WBC 10*3/mm3 11.45* 10.57 14.80*   HEMOGLOBIN g/dL 8.7* 8.7* 10.1*   HEMATOCRIT % 27.1* 26.7* 31.6*   PLATELETS 10*3/mm3 288 260 292       Results from last 7 days  Lab Units 05/10/18  0315 05/09/18  0344 05/08/18  1858   SODIUM mmol/L 138 137 136   POTASSIUM mmol/L 3.6 3.6 4.0   CHLORIDE mmol/L 99 99 94*   CO2 mmol/L 27.0 28.8 26.9   BUN mg/dL 18 20 21   CREATININE mg/dL 1.36* 1.48* 1.51*   CALCIUM mg/dL 8.3* 8.1* 8.2*   BILIRUBIN mg/dL  --   --  0.3   ALK PHOS U/L  --   --  134*   ALT  (SGPT) U/L  --   --  57*   AST (SGOT) U/L  --   --  41*   GLUCOSE mg/dL 93 82 95        IMAGE STUDY: no new study.        Assessment/Plan         This patient is a 77-year-old male with recent recurrent episodes of arterial thrombosis who presented initially with diffuse distributed stroke and then had disease progress despite was on Eliquis anticoagulation with more severe strokes and was also found having cardiac valve vegetation, digital ischemia in all toes on both feet and also large renal artery thrombosis; switched to heparin and then Lovenox anticoagulation. However, he had more evidence of acute stroke in wide distribution evidenced by this morning’s MRI examination. Clearly, he failed Lovenox anticoagulation also. I discussed with the patient, his wife and his daughter today that I recommended to switch anticoagulation to Pradaxa. His previous anti-coagulable status workup on 04/27/2018 was all negative for antiphospholipid antibodies including the antibody panel #2; also negative for anti-lupus anticoagulant.          This patient has a difficult situation with recurrent strokes despite was on Eliquis and later on Lovenox and had a suspicious tumor in the bladder. This patient is on anticoagulation and is not able to have bladder biopsy. I spoke to urologist, Dr. Yarbrough, this morning. He told me to be safely biopsied of the bladder region, patient needs to be off anticoagulation for 7 days and also post procedure needs to be off for another 7 days and the risk is significant for bleeding. But, in the meantime, this patient cannot be off anticoagulation for that long because of his enormous history of recurrent strokes and arterial thrombosis with large cardiac valve vegetation. Dr. Yarbrough’s note recommended CT-guided biopsy of the pelvic lymph node that showed up on CT scan.     I had group conference with other physicians including neurologist, Dr. Madera, and also primary team, Dr. Rivas, today about  the further evaluation and management. We also went on to discuss and review CT scan images with Interventional Radiology, Dr. Coreas. After careful review of the CT scan there is no lymph node to be biopsied by CT-guided needle biopsy in the pelvis, abdomen or chest.     We came back to patient’s room, discussed with the patient, his wife, his son in the room and also his daughter through the phone and basically there is no way to obtain tissue biopsy. His daughter also told us that the patient already decided he does not want to do chemotherapy, even having tissue biopsy. Patient, himself, voiced the same wish. 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 Without treating his malignancy, his thrombophilia will not get better. After a lengthy discussion and explaining to the patient and his family members, we offered home Hospice care, which is agreeable to patient and his family members. Dr. Rivas will call for the discharge plan and get Hospice involved.     In terms for his anticoagulation, we eventually come down to the plan with full-dose Lovenox q.12 h. together with full-dose aspirin 325 mg daily. There are no really good options for anticoagulation as an outpatient, especially considering he is being transferred to Hospice care. Oral Pradaxa probably will not be the best economically as well as efficacy wise, knowing that he did have failure on Lovenox already but was not on full-dose aspirin. There is really no good choice for anticoagulation on this patient. Dr. Rivas explained to the patient and his family members in detail and they all voiced understanding.      PLAN:   1.  Consult hospice care.  2.  Patient will be discharged to home today with home hospice.   3.  He is anticoagulation will be switched back to Lovenox weight-based every 12 hours, together with the full dose aspirin 325 mg daily.          At a conference with the patient and his  family members today.    I discussed that his case today with urologist Dr. Yarbrough, primary team Dr. Rivas, neurologist Dr. Madera, interventional radiologist Dr. Coreas, and my colleague Dr. Mtz today.       We will sign of.  Thank you for letting us participate in the care of this patient!  Please call if any questions.         I spent more than 100 minutes inpatient care today including discussion with other physicians and conference with patient and his family members.      ANNELISE TARIQ M.D., Ph.D.     5/10/2018

## 2018-05-11 NOTE — PROGRESS NOTES
Case Management Discharge Note    Final Note: Pt to be DC'd home with family and [facility].      Destination     Service Request Status Selected Specialties Address Phone Number Fax Number    [facility] Pending - Request Sent N/A [address], [facility] [address] [phone]      Durable Medical Equipment     No service coordination in this encounter.      Dialysis/Infusion     No service coordination in this encounter.      Home Medical Care - Selection Complete     Service Request Status Selected Specialties Address Phone Number Fax Number    [facility] Selected Hospice [address], [facility] [address] [phone]      Social Care     No service coordination in this encounter.        Other:  (Private auto)    Final Discharge Disposition Code: 50 - home with hospice

## 2018-05-14 LAB
BACTERIA SPEC AEROBE CULT: NORMAL
BACTERIA SPEC AEROBE CULT: NORMAL

## 2018-05-15 LAB
BH CV ECHO MEAS - BSA(HAYCOCK): 1.9 M^2
BH CV ECHO MEAS - BSA: 1.9 M^2
BH CV ECHO MEAS - BZI_BMI: 23.4 KILOGRAMS/M^2
BH CV ECHO MEAS - BZI_METRIC_HEIGHT: 177.8 CM
BH CV ECHO MEAS - BZI_METRIC_WEIGHT: 73.9 KG
BH CV VAS BP RIGHT ARM: NORMAL MMHG
MAXIMAL PREDICTED HEART RATE: 145 BPM
STRESS TARGET HR: 123 BPM

## 2018-05-24 ENCOUNTER — APPOINTMENT (OUTPATIENT)
Dept: LAB | Facility: HOSPITAL | Age: 78
End: 2018-05-24

## 2019-05-23 NOTE — DISCHARGE SUMMARY
"    DAILY PROGRESS NOTE  [facility]    Patient Identification:  Name: Jalen Richards  Age: 77 y.o.  Sex: male  :  1940  MRN: [id number]         Primary Care Physician: Geovany Saba MD    Subjective:  Interval History: left sided weakness - never missed Lovenox injection as has been in rehab. Denies cp/ha/n/v/dysphagia and has some insight to confusion at times     Objective:spouse/daughter/RN at bs ~30m counseling     Scheduled Meds:  amLODIPine 5 mg Oral Q24H   aspirin 81 mg Oral Daily   atorvastatin 40 mg Oral Nightly   enoxaparin 80 mg Subcutaneous Q12H   ferrous sulfate 325 mg Oral Daily With Breakfast   finasteride 5 mg Oral Daily   levothyroxine 50 mcg Oral Daily     Continuous Infusions:  sodium chloride 100 mL/hr Last Rate: 100 mL/hr (18 0115)       Vital signs in last 24 hours:  Temp:  [97.6 °F (36.4 °C)-98.5 °F (36.9 °C)] 97.6 °F (36.4 °C)  Heart Rate:  [74-96] 88  Resp:  [18-22] 18  BP: (140-170)/(73-91) 168/85    Intake/Output:    Intake/Output Summary (Last 24 hours) at 18 1318  Last data filed at 185   Gross per 24 hour   Intake             2000 ml   Output                0 ml   Net             2000 ml       Exam:  /85 (BP Location: Right arm, Patient Position: Lying)   Pulse 88   Temp 97.6 °F (36.4 °C) (Oral)   Resp 18   Ht 180.3 cm (71\")   Wt 76.2 kg (167 lb 15.9 oz)   SpO2 95%   BMI 23.43 kg/m²     General Appearance:    Alert, cooperative, no distress, AAOx3, mild aphasia                           Head:    Normocephalic, without obvious abnormality, atraumatic                           Eyes:    PERRL, conjunctiva/corneas clear, EOM's intact, both eyes                         Throat:   Lips, tongue, gums normal; oral mucosa pink and moist                           Neck:   Supple, symmetrical, trachea midline, no JVD                         Lungs:    Clear to auscultation bilaterally, respirations unlabored                         "  Heart:    Regular rate and rhythm, S1 and S2 normal, + murmur                  Abdomen:     Soft, non-tender, bowel sounds active                 Extremities:   Splinter hemorrhages on right hand nail bed, no cyanosis like before involving his toes                      Pulses:   Pulses palpable in all extremities                  Neurologic:   CNII-XII intact, left-sided weakness     Data Review:  Labs in chart were reviewed.    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       Plan:  This patient is well known to myself as I was the discharging physician on previous admission.  He was sent home with Lovenox with a plan to continue this pending repeat echocardiogram to see if there was possibly any improvement before patient will be switched to oral anticoagulation in which Coumadin would've been the advised drug of choice as he previously failed Eliquis.  Only a week has gone past and patient has not missed any Lovenox dosing and now he comes back with an MRI of the brain that shows numerous new infarcts in addition to a new DVT in his lower extremity and now he has issues with aphasia, left-sided weakness, and intermittent confusion.  He is actually quite alert and interactive and while he may mess up on certain orientation questions he also has pretty intact memory and  his spouse and daughter who is now power of  at bedside.  We all had a quite lengthy discussion as previously had discussed CODE STATUS with this gentleman and he wanted a full code.  He had since had conversations with his son-in-law about how he did not want full CODE STATUS.  Today we extensively discussed this and the patient very openly states he does not want feeding tubes, ventilators, compressions or shocking of the heart.  He wants to proceed with full medical management but I was very honest in the sense that I feel we are running out of options for this gentleman and my concern is that we could be heading towards a palliative care picture.  The family seems to be quite understandable with this as is the patient though obviously it's not the answer they want to hear.  Will bring on all former consultants at this juncture and see what further recommendations they provide and ultimately his clinical course will decide ultimate outcome on what we do to provide treatment versus comfort for this gentleman.    Randy Rivas MD  5/9/2018  1:18 PM     shortness of breath

## 2019-11-30 NOTE — PROGRESS NOTES
Continued Stay Note   [facility]     Patient Name: Jalen Richards  MRN: [id number]  Today's Date: 5/10/2018    Admit Date: 5/8/2018          Discharge Plan     Row Name 05/10/18 3300       Plan    Plan Referral to [facility].    Plan Comments Referral to [facility] per MD order.  Need to see if [facility] will cover Lovenox injections.  Spoke with pt and family at bedside.  They are in agreement with referral to [facility] and prefer that Dolly eval pt while he is in hospital rather than waiting until he goes home.  Deb/ Dolly will meet with pt/ family today.                Discharge Codes    No documentation.       Expected Discharge Date and Time     Expected Discharge Date Expected Discharge Time    May 10, 2018             Agnes Haro RN     Name band;

## 2020-01-23 NOTE — ED TRIAGE NOTES
Patient presents from home via private vehicle with family.  Reports onset of slurred speech, dizziness, difficulty with word finding and left leg weakness/ataxia.  Family reports onset of 0945 approximately.   Name band;

## 2022-08-12 NOTE — CONSULTS
Patient Name: Jalen Richards  :1940  77 y.o.    Date of Admission: 2018  Date of Consultation:  18  Encounter Provider: TJ Garcia  Place of Service: [facility] CARDIOLOGY  Referring Provider: Alan Santana MD  Patient Care Team:  No Known Provider as PCP - General      Chief complaint: slurred speech, LE weakness    Reason for consultation : elevated troponin    History of Present Illness: Mr. Richards is a 77 year old male, with history of HTN, HLD, and hypothyroidism. He presented to the emergency room this morning with sudden onset of dizziness, bilateral lower extremity weakness, and slurred speech. Symptoms started to improve upon arriving to the emergency room and he was not considered a TPA candidate. 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 Due to potential for neurologic deterioration, he is being monitored in the intensive care unit. He is currently near baseline but states he thinks he would still be a little unsteady on his feet.     He was also found to have an elevated troponin. Patient does not work but states he is physically active and denies any complaints of chest pain or pressure, shortness of breath, diaphoresis, nausea, palpitations. He has not had any prior episodes of syncope or near syncope. He smokes 1 ppd. He rarely drinks alcohol. He drinks 3 cups of caffeine daily. He denies any family history of coronary artery disease.       Past Medical History:   Diagnosis Date   • Cancer     3-4 years ago lymph node radiation    • Disease of thyroid gland    • Hyperlipidemia    • Hypertension        Past Surgical History:   Procedure Laterality Date   • JOINT REPLACEMENT      left hip         Prior to Admission medications    Medication Sig Start Date End Date Taking? Authorizing Provider   amLODIPine (NORVASC) 5 MG tablet Take 5 mg by mouth Daily.   Yes Historical Provider, MD   atorvastatin  Pre-admit telephone appointment completed. Pt states all instructions given are understood. Medications the patient will take the morning of surgery per anesthesia protocol: If needed, [place].     (LIPITOR) 20 MG tablet Take 20 mg by mouth Daily.   Yes Historical Provider, MD   levothyroxine (SYNTHROID, LEVOTHROID) 50 MCG tablet Take 50 mcg by mouth Daily.   Yes Historical Provider, MD   lisinopril-hydrochlorothiazide (PRINZIDE,ZESTORETIC) 20-25 MG per tablet Take 1 tablet by mouth Daily.   Yes Historical Provider, MD       No Known Allergies    Social History     Social History   • Marital status:      Spouse name: Katelyn     Social History Main Topics   • Smoking status: Current Every Day Smoker     Packs/day: 1.00   • Smokeless tobacco: Never Used   • Alcohol use No   • Drug use: No   • Sexual activity: Not Currently     Other Topics Concern   • Not on file       History reviewed. No pertinent family history.    REVIEW OF SYSTEMS:   All systems reviewed.  Pertinent positives identified in HPI.  All other systems are negative.      Objective:     Vitals:    04/12/18 1218 04/12/18 1222 04/12/18 1242 04/12/18 1302   BP: (!) 192/96 160/77 179/98 137/76   BP Location:       Patient Position:       Pulse: 88 85 92 82   Resp:   16 16   Temp:       TempSrc:       SpO2: 94% 97% 96% 95%   Weight:       Height:         Body mass index is 25.1 kg/m².    Physical Exam:  Constitutional: He is oriented to person, place, and time. He appears well-developed. He does not appear ill.   HENT:   Head: Normocephalic and atraumatic. Head is without contusion.   Right Ear: Hearing normal. No drainage.   Left Ear: Hearing normal. No drainage.   Nose: No nasal deformity. No epistaxis.   Eyes: Lids are normal. Right eye exhibits no exudate. Left eye exhibits no exudate.  Neck: No JVD present. Carotid bruit is not present. No tracheal deviation present. No thyroid mass and no thyromegaly present.   Cardiovascular: Normal rate, regular rhythm and normal heart sounds.    Pulses:       Posterior tibial pulses are 2+ on the right side, and 2+ on the left side.   Pulmonary/Chest: Effort normal and breath sounds normal.   Abdominal:  Soft. Normal appearance and bowel sounds are normal. There is no tenderness.   Musculoskeletal: Normal range of motion.        Right shoulder: He exhibits no deformity.        Left shoulder: He exhibits no deformity.   Neurological: He is alert and oriented to person, place, and time. He has normal strength.   Skin: Skin is warm, dry and intact. No rash noted.   Psychiatric: He has a normal mood and affect. His behavior is normal. Thought content normal.   Vitals reviewed      Lab Review:       Results from last 7 days  Lab Units 04/12/18  1100   SODIUM mmol/L 138   POTASSIUM mmol/L 4.0   CHLORIDE mmol/L 96*   CO2 mmol/L 27.3   BUN mg/dL 18   CREATININE mg/dL 0.97   CALCIUM mg/dL 9.3   BILIRUBIN mg/dL 0.3   ALK PHOS U/L 119*   ALT (SGPT) U/L 18   AST (SGOT) U/L 29   GLUCOSE mg/dL 122*       Results from last 7 days  Lab Units 04/12/18  1100   TROPONIN T ng/mL 0.476*       Results from last 7 days  Lab Units 04/12/18  1100   WBC 10*3/mm3 15.37*   HEMOGLOBIN g/dL 14.8   HEMATOCRIT % 44.4   PLATELETS 10*3/mm3 235       Results from last 7 days  Lab Units 04/12/18  1100   INR  1.10   APTT seconds 31.7                        I personally viewed and interpreted the patient's EKG/Telemetry data.      Current Facility-Administered Medications:   •  acetaminophen (TYLENOL) tablet 650 mg, 650 mg, Oral, Q4H PRN **OR** acetaminophen (TYLENOL) suppository 650 mg, 650 mg, Rectal, Q4H PRN, Bradford Gordon MD  •  alteplase (ACTIVASE) 100 MG injection  - ADS Override Pull, , , ,   •  aspirin tablet 325 mg, 325 mg, Oral, Daily **OR** aspirin suppository 300 mg, 300 mg, Rectal, Daily, Bradford Gordon MD  •  atorvastatin (LIPITOR) tablet 80 mg, 80 mg, Oral, Daily, Bradford Gordon MD, 80 mg at 04/12/18 1538  •  levothyroxine sodium injection 25 mcg, 25 mcg, Intravenous, Daily, Alan Santana MD  •  ondansetron (ZOFRAN) injection 4 mg, 4 mg, Intravenous, Q6H PRN, Bradford Gordon MD  •  sodium chloride 0.9 % flush 1-10 mL, 1-10 mL,  Intravenous, PRN, Bradford Gordon MD  •  sodium chloride 0.9 % flush 10 mL, 10 mL, Intravenous, PRN, Vitor Diaz MD  •  sodium chloride 0.9 % infusion, 100 mL/hr, Intravenous, Continuous, Bradford Gordon MD, Last Rate: 100 mL/hr at 04/12/18 1538, 100 mL/hr at 04/12/18 1538    Assessment and Plan:   1. Acute bilateral occipital strokes - neurologically he is near baseline. Await further recommendations from neurology.     2. Elevated troponin - likely due to stroke. No other evidence of ACS and he is completely asymptomatic. He is on ASA. Will trend trops and repeat EKG in the morning. An echocardiogram has already been ordered.     3. HTN - BP meds are on hold for permissive HTN.    4. HLD - on statin    5. Tobacco abuse -  Cessation encouraged.     Sybil Hurtado, TJ  04/12/18  4:04 PM